# Patient Record
Sex: FEMALE | Race: WHITE | NOT HISPANIC OR LATINO | Employment: OTHER | URBAN - METROPOLITAN AREA
[De-identification: names, ages, dates, MRNs, and addresses within clinical notes are randomized per-mention and may not be internally consistent; named-entity substitution may affect disease eponyms.]

---

## 2020-01-28 ENCOUNTER — OFFICE VISIT (OUTPATIENT)
Dept: CARDIOTHORACIC SURGERY | Facility: CLINIC | Age: 50
End: 2020-01-28
Payer: MEDICARE

## 2020-01-28 VITALS
BODY MASS INDEX: 29.12 KG/M2 | WEIGHT: 164.38 LBS | SYSTOLIC BLOOD PRESSURE: 120 MMHG | HEIGHT: 63 IN | OXYGEN SATURATION: 96 % | DIASTOLIC BLOOD PRESSURE: 69 MMHG | TEMPERATURE: 98 F | HEART RATE: 95 BPM

## 2020-01-28 DIAGNOSIS — J44.9 CHRONIC OBSTRUCTIVE PULMONARY DISEASE, UNSPECIFIED COPD TYPE: ICD-10-CM

## 2020-01-28 DIAGNOSIS — R79.9 ABNORMAL FINDING OF BLOOD CHEMISTRY, UNSPECIFIED: ICD-10-CM

## 2020-01-28 DIAGNOSIS — I63.9 CEREBROVASCULAR ACCIDENT (CVA), UNSPECIFIED MECHANISM: ICD-10-CM

## 2020-01-28 DIAGNOSIS — R79.1 ABNORMAL COAGULATION PROFILE: ICD-10-CM

## 2020-01-28 DIAGNOSIS — M06.9 RHEUMATOID ARTHRITIS, INVOLVING UNSPECIFIED SITE, UNSPECIFIED RHEUMATOID FACTOR PRESENCE: ICD-10-CM

## 2020-01-28 DIAGNOSIS — R94.2 ABNORMAL RESULTS OF PULMONARY FUNCTION STUDIES: ICD-10-CM

## 2020-01-28 DIAGNOSIS — I35.9 AORTIC VALVE DISEASE: Primary | ICD-10-CM

## 2020-01-28 DIAGNOSIS — I67.2 CEREBRAL ATHEROSCLEROSIS: ICD-10-CM

## 2020-01-28 DIAGNOSIS — Z01.810 PREOP CARDIOVASCULAR EXAM: Primary | ICD-10-CM

## 2020-01-28 DIAGNOSIS — Z95.2 S/P AVR (AORTIC VALVE REPLACEMENT): ICD-10-CM

## 2020-01-28 PROCEDURE — 99999 PR PBB SHADOW E&M-NEW PATIENT-LVL III: ICD-10-PCS | Mod: PBBFAC,,, | Performed by: THORACIC SURGERY (CARDIOTHORACIC VASCULAR SURGERY)

## 2020-01-28 PROCEDURE — 99999 PR PBB SHADOW E&M-NEW PATIENT-LVL III: CPT | Mod: PBBFAC,,, | Performed by: THORACIC SURGERY (CARDIOTHORACIC VASCULAR SURGERY)

## 2020-01-28 PROCEDURE — 99205 PR OFFICE/OUTPT VISIT, NEW, LEVL V, 60-74 MIN: ICD-10-PCS | Mod: S$PBB,,, | Performed by: THORACIC SURGERY (CARDIOTHORACIC VASCULAR SURGERY)

## 2020-01-28 PROCEDURE — 99203 OFFICE O/P NEW LOW 30 MIN: CPT | Mod: PBBFAC | Performed by: THORACIC SURGERY (CARDIOTHORACIC VASCULAR SURGERY)

## 2020-01-28 PROCEDURE — 99205 OFFICE O/P NEW HI 60 MIN: CPT | Mod: S$PBB,,, | Performed by: THORACIC SURGERY (CARDIOTHORACIC VASCULAR SURGERY)

## 2020-01-28 RX ORDER — LISINOPRIL 20 MG/1
20 TABLET ORAL
Status: ON HOLD | COMMUNITY
Start: 2016-12-14 | End: 2020-03-16 | Stop reason: HOSPADM

## 2020-01-28 RX ORDER — FLUTICASONE FUROATE AND VILANTEROL 100; 25 UG/1; UG/1
1 POWDER RESPIRATORY (INHALATION)
COMMUNITY
Start: 2019-08-05

## 2020-01-28 RX ORDER — CYCLOBENZAPRINE HCL 10 MG
TABLET ORAL
Status: ON HOLD | COMMUNITY
Start: 2020-01-06 | End: 2020-03-16 | Stop reason: HOSPADM

## 2020-01-28 RX ORDER — POTASSIUM CHLORIDE 750 MG/1
20 TABLET, EXTENDED RELEASE ORAL
Status: ON HOLD | COMMUNITY
End: 2020-03-16 | Stop reason: HOSPADM

## 2020-01-28 RX ORDER — WARFARIN 10 MG/1
10 TABLET ORAL
Status: ON HOLD | COMMUNITY
Start: 2014-06-03 | End: 2020-03-16 | Stop reason: HOSPADM

## 2020-01-28 RX ORDER — ALBUTEROL SULFATE 90 UG/1
1 AEROSOL, METERED RESPIRATORY (INHALATION) EVERY 4 HOURS PRN
COMMUNITY
Start: 2019-08-05

## 2020-01-28 RX ORDER — TEMAZEPAM 7.5 MG/1
7.5 CAPSULE ORAL
COMMUNITY

## 2020-01-28 RX ORDER — ALLOPURINOL 300 MG/1
300 TABLET ORAL
COMMUNITY

## 2020-01-28 RX ORDER — GABAPENTIN 600 MG/1
TABLET ORAL
COMMUNITY
Start: 2020-01-11

## 2020-01-28 RX ORDER — ATORVASTATIN CALCIUM 10 MG/1
TABLET, FILM COATED ORAL
Status: ON HOLD | COMMUNITY
Start: 2020-01-06 | End: 2020-03-16 | Stop reason: HOSPADM

## 2020-01-28 RX ORDER — HYDROXYCHLOROQUINE SULFATE 200 MG/1
200 TABLET, FILM COATED ORAL
COMMUNITY

## 2020-01-28 RX ORDER — MIRABEGRON 25 MG/1
TABLET, FILM COATED, EXTENDED RELEASE ORAL
COMMUNITY
Start: 2019-12-09

## 2020-01-28 RX ORDER — LANOLIN ALCOHOL/MO/W.PET/CERES
100 CREAM (GRAM) TOPICAL
COMMUNITY

## 2020-01-28 RX ORDER — IPRATROPIUM BROMIDE AND ALBUTEROL SULFATE 2.5; .5 MG/3ML; MG/3ML
3 SOLUTION RESPIRATORY (INHALATION) DAILY PRN
COMMUNITY
Start: 2015-08-25

## 2020-01-28 RX ORDER — TRAZODONE HYDROCHLORIDE 50 MG/1
50 TABLET ORAL
COMMUNITY
Start: 2013-12-20

## 2020-01-28 RX ORDER — DIAZEPAM 5 MG/1
5 TABLET ORAL
COMMUNITY

## 2020-01-28 RX ORDER — SUCRALFATE 1 G/1
TABLET ORAL
COMMUNITY
Start: 2020-01-06

## 2020-01-28 RX ORDER — OXYCODONE AND ACETAMINOPHEN 5; 325 MG/1; MG/1
1 TABLET ORAL
Status: ON HOLD | COMMUNITY
Start: 2014-03-28 | End: 2020-03-16 | Stop reason: HOSPADM

## 2020-01-28 RX ORDER — HYDROCODONE BITARTRATE AND ACETAMINOPHEN 5; 325 MG/1; MG/1
1 TABLET ORAL EVERY 6 HOURS PRN
Status: ON HOLD | COMMUNITY
Start: 2017-06-20 | End: 2020-03-16 | Stop reason: HOSPADM

## 2020-01-28 RX ORDER — WARFARIN 2 MG/1
TABLET ORAL
Status: ON HOLD | COMMUNITY
Start: 2020-01-10 | End: 2020-03-16 | Stop reason: HOSPADM

## 2020-01-28 RX ORDER — PANTOPRAZOLE SODIUM 40 MG/1
40 TABLET, DELAYED RELEASE ORAL
Status: ON HOLD | COMMUNITY
Start: 2019-10-09 | End: 2020-03-16 | Stop reason: HOSPADM

## 2020-01-28 RX ORDER — LEFLUNOMIDE 20 MG/1
TABLET ORAL
COMMUNITY
Start: 2019-11-11

## 2020-01-28 RX ORDER — ALBUTEROL SULFATE 90 UG/1
AEROSOL, METERED RESPIRATORY (INHALATION)
COMMUNITY
Start: 2019-10-22

## 2020-01-28 RX ORDER — METOPROLOL SUCCINATE 25 MG/1
25 TABLET, EXTENDED RELEASE ORAL
Status: ON HOLD | COMMUNITY
End: 2020-03-16 | Stop reason: HOSPADM

## 2020-01-28 RX ORDER — PROMETHAZINE HYDROCHLORIDE 25 MG/1
25 TABLET ORAL EVERY 6 HOURS PRN
COMMUNITY

## 2020-01-28 RX ORDER — WARFARIN 7.5 MG/1
15 TABLET ORAL
Status: ON HOLD | COMMUNITY
End: 2020-03-16 | Stop reason: HOSPADM

## 2020-01-28 RX ORDER — ALBUTEROL SULFATE 5 MG/ML
2.5 SOLUTION RESPIRATORY (INHALATION)
COMMUNITY

## 2020-01-28 RX ORDER — FUROSEMIDE 40 MG/1
40 TABLET ORAL
Status: ON HOLD | COMMUNITY
Start: 2015-08-25 | End: 2020-03-16 | Stop reason: HOSPADM

## 2020-01-28 RX ORDER — LEVOFLOXACIN 750 MG/1
TABLET ORAL
Status: ON HOLD | COMMUNITY
Start: 2019-11-05 | End: 2020-03-16 | Stop reason: HOSPADM

## 2020-01-28 NOTE — PROGRESS NOTES
Subjective:      Patient ID: Michelle Hodges is a 49 y.o. female.    Chief Complaint: No chief complaint on file.      HPI:  Michelle Hodges is a 49 y.o. female who presents with s/p mechanical AVR 2012 in [place], , COPD on home oxygen PRN 2L NC, stroke in 2011 with left sided deficits that resolved after TPA and current smoker. Pt was told her aortic valve was not working and went in for reop for redo AVR then SHADE normal functioning aortic valve, surgery was cancelled. Pt has c/o increase COOPER, leg swelling and chest pain that has processed over the past 2 years. Pt here today for surgical eval for redo AVR.       Family and social history reviewed    Review of patient's allergies indicates:  Allergies not on file  No past medical history on file.  No past surgical history on file.  Family History     None        Social History     Socioeconomic History    Marital status:      Spouse name: Not on file    Number of children: Not on file    Years of education: Not on file    Highest education level: Not on file   Occupational History    Not on file   Social Needs    Financial resource strain: Not on file    Food insecurity:     Worry: Not on file     Inability: Not on file    Transportation needs:     Medical: Not on file     Non-medical: Not on file   Tobacco Use    Smoking status: Not on file   Substance and Sexual Activity    Alcohol use: Not on file    Drug use: Not on file    Sexual activity: Not on file   Lifestyle    Physical activity:     Days per week: Not on file     Minutes per session: Not on file    Stress: Not on file   Relationships    Social connections:     Talks on phone: Not on file     Gets together: Not on file     Attends Evangelical service: Not on file     Active member of club or organization: Not on file     Attends meetings of clubs or organizations: Not on file     Relationship status: Not on file   Other Topics Concern    Not on file   Social History Narrative     Not on file       Current medications Reviewed    Review of Systems   Constitutional: Negative for activity change and fatigue.   Respiratory: Positive for shortness of breath. Negative for cough.    Cardiovascular: Positive for leg swelling. Negative for chest pain and palpitations.   Gastrointestinal: Negative for abdominal pain, nausea and vomiting.   Endocrine: Negative for polydipsia, polyphagia and polyuria.   Genitourinary: Negative for dysuria.   Musculoskeletal: Negative for gait problem.   Skin: Negative for rash.   Allergic/Immunologic: Negative for immunocompromised state.   Neurological: Negative for dizziness, syncope and weakness.   Hematological: Does not bruise/bleed easily.   Psychiatric/Behavioral: Negative for behavioral problems.     Objective:   Physical Exam   Constitutional: She is oriented to person, place, and time. She appears well-developed and well-nourished.   HENT:   Head: Normocephalic.   Nose: Nose normal.   Eyes: EOM are normal.   Neck: Normal range of motion.   Cardiovascular: Normal rate, regular rhythm and normal heart sounds.   Pulmonary/Chest: Effort normal and breath sounds normal.   Abdominal: Soft.   Musculoskeletal: Normal range of motion.   Neurological: She is alert and oriented to person, place, and time.   Skin: Skin is warm, dry and intact.   Lower ext discoloration    Psychiatric: She has a normal mood and affect.       Diagnostic Results:   [facility] ECHO: Mechanical AVR, MG 33 Peak 3.8 consistent with prosthetic mismatch. EF 55%    LHC: negative     CT chest: reviewed    All diagnotics and labs reviewed.    STS Score:3%  Assessment:   1. S/P AVR   Plan:   Repeat ECHO, pre op for redo AVR       CTS Attending Note:    I have personally taken the history and examined this patient and agree with the HUGO's note as stated above. Pleasant 49-year-old woman who underwent aortic valve replacement with a mechanical Medtronic valve in 2012 she has had  significant difficulty maintaining INR in range since the operation.  Over the past several months, she has had progressive dyspnea exertion.  An echo demonstrated the equivalent of moderate aortic stenosis, with mean gradients in the 30s.  A fluoroscopic study the valve demonstrated stuck leaflet.  The situation is complicated by her ongoing smoking.  She still smokes half a pack a day.  I discussed her situation with her and her family in detail I agree that we replacement of aortic valve is indicated based the various studies.  I emphasized to the patient and her family that while I thought that we replacement of the valve was reasonable, we could not be certain that it would completely resolve her dyspnea given her underlying lung disease and continued smoking.  We also had a long discussion about valve choice.  Given the difficulty she has had with anticoagulation, she desires a tissue valve.  I think that reasonable.  We discussed the risks and benefits of the proposed procedure, including but not limited to death, stroke, respiratory complications, renal complications, arrythmia, need for permanent pacemaker, and infection. Her questions have been answered, and she wishes to proceed.

## 2020-01-28 NOTE — LETTER
[facility] Cardi[facility]  [address]  [facility] [address]  Phone: [phone] January 28, 2020      Prem Giron Jr., MD  [address] [place] [id number]    Patient: Michelle Hodges   MR Number: [id number]   YOB: 1970   Date of Visit: 1/28/2020     Dear Dr. Giron,     I had the pleasure seeing your patient Ms. Michelle Hodges in clinic today.  As you know, she is a very pleasant 49-year-old woman who underwent aortic valve replacement in 2012 in [place].  At that time, a mechanical valve was placed.  She has had difficulty since, with maintaining her INR in the proper range.  She has had progressive dyspnea on exertion over the past several months.  An echo demonstrated increased velocities in the left ventricular outflow tract as well as an elevated transvalvular gradient.  A fluoroscopic study demonstrated a frozen leaflet.      Based on these findings, I recommended redo aortic valve replacement.  She agreed with this.  We plan to get this done for her sometime in the near future.  Thank you for sending this pleasant woman to me.  It was a pleasure to meet her.  When she does come to surgery, I will certainly keep you apprised of her progress.    Sincerely,        Jack Jara MD  Chief, Division of Thoracic & Cardiovascular Surgery  [facility]    PEP/etb    CC  Ramiro Orozco MD

## 2020-01-29 ENCOUNTER — TELEPHONE (OUTPATIENT)
Dept: PREADMISSION TESTING | Facility: HOSPITAL | Age: 50
End: 2020-01-29

## 2020-01-29 ENCOUNTER — PATIENT MESSAGE (OUTPATIENT)
Dept: CARDIOTHORACIC SURGERY | Facility: CLINIC | Age: 50
End: 2020-01-29

## 2020-01-29 RX ORDER — ENOXAPARIN SODIUM 150 MG/ML
1 INJECTION SUBCUTANEOUS EVERY 12 HOURS
Qty: 4 ML | Refills: 0 | Status: SHIPPED | OUTPATIENT
Start: 2020-02-10 | End: 2020-01-31

## 2020-01-29 RX ORDER — ENOXAPARIN SODIUM 150 MG/ML
1 INJECTION SUBCUTANEOUS
COMMUNITY
Start: 2020-02-10 | End: 2020-01-29 | Stop reason: SDUPTHER

## 2020-01-29 NOTE — TELEPHONE ENCOUNTER
----- Message from Aureliano Martins RN sent at 1/28/2020  3:15 PM CST -----  Anesthesia Pre op Appt Request - Estef[facility]    02/14/20  AVR re-do    Requested Date:  2/13/20 Thursday      Thanks........ Juan   [id number]

## 2020-01-31 ENCOUNTER — OUTSIDE PLACE OF SERVICE (OUTPATIENT)
Dept: CARDIOLOGY | Facility: CLINIC | Age: 50
End: 2020-01-31

## 2020-01-31 ENCOUNTER — PATIENT MESSAGE (OUTPATIENT)
Dept: CARDIOTHORACIC SURGERY | Facility: CLINIC | Age: 50
End: 2020-01-31

## 2020-01-31 RX ORDER — ENOXAPARIN SODIUM 150 MG/ML
INJECTION SUBCUTANEOUS
Qty: 4 ML | Refills: 0 | Status: ON HOLD | OUTPATIENT
Start: 2020-01-31 | End: 2020-03-16 | Stop reason: HOSPADM

## 2020-01-31 RX ORDER — ENOXAPARIN SODIUM 150 MG/ML
1 INJECTION SUBCUTANEOUS EVERY 12 HOURS
Qty: 9 SYRINGE | Refills: 0 | Status: CANCELLED | OUTPATIENT
Start: 2020-01-31 | End: 2020-02-05

## 2020-02-13 ENCOUNTER — ANESTHESIA EVENT (OUTPATIENT)
Dept: SURGERY | Facility: HOSPITAL | Age: 50
DRG: 220 | End: 2020-02-13
Payer: MEDICARE

## 2020-02-13 ENCOUNTER — HOSPITAL ENCOUNTER (OUTPATIENT)
Dept: PULMONOLOGY | Facility: CLINIC | Age: 50
Discharge: HOME | End: 2020-02-13
Payer: MEDICARE

## 2020-02-13 ENCOUNTER — DOCUMENTATION ONLY (OUTPATIENT)
Dept: CARDIOTHORACIC SURGERY | Facility: CLINIC | Age: 50
End: 2020-02-13

## 2020-02-13 ENCOUNTER — HOSPITAL ENCOUNTER (OUTPATIENT)
Dept: CARDIOLOGY | Facility: CLINIC | Age: 50
Discharge: HOME | End: 2020-02-13
Payer: MEDICARE

## 2020-02-13 ENCOUNTER — HOSPITAL ENCOUNTER (OUTPATIENT)
Dept: PREADMISSION TESTING | Facility: HOSPITAL | Age: 50
Discharge: HOME | End: 2020-02-13
Attending: THORACIC SURGERY (CARDIOTHORACIC VASCULAR SURGERY)
Payer: MEDICARE

## 2020-02-13 ENCOUNTER — OFFICE VISIT (OUTPATIENT)
Dept: CARDIOTHORACIC SURGERY | Facility: CLINIC | Age: 50
End: 2020-02-13
Payer: MEDICARE

## 2020-02-13 ENCOUNTER — HOSPITAL ENCOUNTER (OUTPATIENT)
Dept: RADIOLOGY | Facility: HOSPITAL | Age: 50
Discharge: HOME | End: 2020-02-13
Attending: THORACIC SURGERY (CARDIOTHORACIC VASCULAR SURGERY)
Payer: MEDICARE

## 2020-02-13 ENCOUNTER — HOSPITAL ENCOUNTER (OUTPATIENT)
Dept: CARDIOLOGY | Facility: CLINIC | Age: 50
Discharge: HOME | End: 2020-02-13
Attending: THORACIC SURGERY (CARDIOTHORACIC VASCULAR SURGERY)
Payer: MEDICARE

## 2020-02-13 ENCOUNTER — HOSPITAL ENCOUNTER (OUTPATIENT)
Dept: VASCULAR SURGERY | Facility: CLINIC | Age: 50
Discharge: HOME | End: 2020-02-13
Attending: THORACIC SURGERY (CARDIOTHORACIC VASCULAR SURGERY)
Payer: MEDICARE

## 2020-02-13 VITALS
WEIGHT: 160.06 LBS | BODY MASS INDEX: 28.35 KG/M2 | HEART RATE: 90 BPM | OXYGEN SATURATION: 97 % | DIASTOLIC BLOOD PRESSURE: 68 MMHG | TEMPERATURE: 98 F | SYSTOLIC BLOOD PRESSURE: 117 MMHG

## 2020-02-13 VITALS
SYSTOLIC BLOOD PRESSURE: 112 MMHG | DIASTOLIC BLOOD PRESSURE: 74 MMHG | HEART RATE: 94 BPM | WEIGHT: 162 LBS | HEIGHT: 63 IN | BODY MASS INDEX: 28.7 KG/M2

## 2020-02-13 VITALS
WEIGHT: 162 LBS | SYSTOLIC BLOOD PRESSURE: 111 MMHG | DIASTOLIC BLOOD PRESSURE: 74 MMHG | TEMPERATURE: 98 F | HEIGHT: 63 IN | BODY MASS INDEX: 28.7 KG/M2 | OXYGEN SATURATION: 98 % | HEART RATE: 93 BPM | RESPIRATION RATE: 16 BRPM

## 2020-02-13 DIAGNOSIS — I65.23 BILATERAL CAROTID ARTERY STENOSIS: ICD-10-CM

## 2020-02-13 DIAGNOSIS — T82.09XD PROSTHETIC VALVE DYSFUNCTION, SUBSEQUENT ENCOUNTER: ICD-10-CM

## 2020-02-13 DIAGNOSIS — Z95.2 S/P AVR (AORTIC VALVE REPLACEMENT): ICD-10-CM

## 2020-02-13 DIAGNOSIS — Z01.810 PREOP CARDIOVASCULAR EXAM: ICD-10-CM

## 2020-02-13 DIAGNOSIS — Z79.01 LONG TERM CURRENT USE OF ANTICOAGULANT: ICD-10-CM

## 2020-02-13 DIAGNOSIS — I67.2 CEREBRAL ATHEROSCLEROSIS: ICD-10-CM

## 2020-02-13 DIAGNOSIS — R94.2 ABNORMAL RESULTS OF PULMONARY FUNCTION STUDIES: ICD-10-CM

## 2020-02-13 DIAGNOSIS — Z95.2 S/P AVR (AORTIC VALVE REPLACEMENT): Primary | ICD-10-CM

## 2020-02-13 PROBLEM — T82.09XA PROSTHETIC VALVE DYSFUNCTION: Status: ACTIVE | Noted: 2020-02-13

## 2020-02-13 LAB
ASCENDING AORTA: 2.21 CM
AV INDEX (PROSTH): 0.36
AV MEAN GRADIENT: 51 MMHG
AV PEAK GRADIENT: 88 MMHG
AV VALVE AREA: 1.12 CM2
AV VELOCITY RATIO: 0.34
BSA FOR ECHO PROCEDURE: 1.81 M2
CV ECHO LV RWT: 0.33 CM
DLCO ADJ PRE: 13.93 ML/(MIN*MMHG) (ref 18.44–29.9)
DLCO SINGLE BREATH LLN: 18.44
DLCO SINGLE BREATH PRE REF: 56.5 %
DLCO SINGLE BREATH REF: 24.17
DLCOC SBVA LLN: 3.36
DLCOC SBVA PRE REF: 62.4 %
DLCOC SBVA REF: 4.87
DLCOC SINGLE BREATH LLN: 18.44
DLCOC SINGLE BREATH PRE REF: 57.6 %
DLCOC SINGLE BREATH REF: 24.17
DLCOCSBVAULN: 6.37
DLCOCSINGLEBREATHULN: 29.9
DLCOSINGLEBREATHULN: 29.9
DLCOVA LLN: 3.36
DLCOVA PRE REF: 61.3 %
DLCOVA PRE: 2.98 ML/(MIN*MMHG*L) (ref 3.36–6.37)
DLCOVA REF: 4.87
DLCOVAULN: 6.37
DLVAADJ PRE: 3.04 ML/(MIN*MMHG*L) (ref 3.36–6.37)
DOP CALC AO PEAK VEL: 4.68 M/S
DOP CALC AO VTI: 90 CM
DOP CALC LVOT AREA: 3.1 CM2
DOP CALC LVOT DIAMETER: 2 CM
DOP CALC LVOT PEAK VEL: 1.58 M/S
DOP CALC LVOT STROKE VOLUME: 100.48 CM3
DOP CALCLVOT PEAK VEL VTI: 32 CM
E WAVE DECELERATION TIME: 196.53 MSEC
E/A RATIO: 1.19
E/E' RATIO: 11.67 M/S
ECHO LV POSTERIOR WALL: 0.71 CM (ref 0.6–1.1)
FEF 25 75 LLN: 1.48
FEF 25 75 PRE REF: 69.8 %
FEF 25 75 REF: 2.67
FEV05 LLN: 1.12
FEV05 REF: 1.98
FEV1 FVC LLN: 69
FEV1 FVC PRE REF: 94.6 %
FEV1 FVC REF: 80
FEV1 LLN: 2.13
FEV1 PRE REF: 81.3 %
FEV1 REF: 2.74
FRACTIONAL SHORTENING: 35 % (ref 28–44)
FVC LLN: 2.68
FVC PRE REF: 85.4 %
FVC REF: 3.44
INTERVENTRICULAR SEPTUM: 0.91 CM (ref 0.6–1.1)
IVC PRE: 3.04 L (ref 2.68–4.19)
IVC SINGLE BREATH LLN: 2.68
IVC SINGLE BREATH PRE REF: 88.4 %
IVC SINGLE BREATH REF: 3.44
IVCSINGLEBREATHULN: 4.19
LA MAJOR: 4.37 CM
LA MINOR: 4.34 CM
LA WIDTH: 4 CM
LEFT ATRIUM SIZE: 4 CM
LEFT ATRIUM VOLUME INDEX: 33.5 ML/M2
LEFT ATRIUM VOLUME: 59.23 CM3
LEFT INTERNAL DIMENSION IN SYSTOLE: 2.82 CM (ref 2.1–4)
LEFT VENTRICLE DIASTOLIC VOLUME INDEX: 47.42 ML/M2
LEFT VENTRICLE DIASTOLIC VOLUME: 83.85 ML
LEFT VENTRICLE MASS INDEX: 61 G/M2
LEFT VENTRICLE SYSTOLIC VOLUME INDEX: 17 ML/M2
LEFT VENTRICLE SYSTOLIC VOLUME: 30.09 ML
LEFT VENTRICULAR INTERNAL DIMENSION IN DIASTOLE: 4.32 CM (ref 3.5–6)
LEFT VENTRICULAR MASS: 107.9 G
LV LATERAL E/E' RATIO: 13.13 M/S
LV SEPTAL E/E' RATIO: 10.5 M/S
MV PEAK A VEL: 0.88 M/S
MV PEAK E VEL: 1.05 M/S
MVV LLN: 87
MVV PRE REF: 106.3 %
MVV REF: 103
PEF LLN: 5.01
PEF PRE REF: 76.5 %
PEF REF: 6.73
PHYSICIAN COMMENT: ABNORMAL
PISA TR MAX VEL: 2.56 M/S
PRE DLCO: 13.66 ML/(MIN*MMHG) (ref 18.44–29.9)
PRE FEF 25 75: 1.86 L/S (ref 1.48–3.86)
PRE FET 100: 6.22 SEC
PRE FEV05 REF: 91.4 %
PRE FEV1 FVC: 75.98 % (ref 69.1–91.56)
PRE FEV1: 2.23 L (ref 2.13–3.36)
PRE FEV5: 1.81 L (ref 1.12–2.83)
PRE FVC: 2.94 L (ref 2.68–4.19)
PRE MVV: 109 L/MIN (ref 87.16–117.92)
PRE PEF: 5.15 L/S (ref 5.01–8.45)
PULM VEIN S/D RATIO: 0.8
PV PEAK D VEL: 0.76 M/S
PV PEAK S VEL: 0.61 M/S
RA MAJOR: 3.62 CM
RA PRESSURE: 3 MMHG
RA WIDTH: 2.81 CM
RIGHT VENTRICULAR END-DIASTOLIC DIMENSION: 2.84 CM
SINUS: 2.53 CM
STJ: 1.76 CM
TDI LATERAL: 0.08 M/S
TDI SEPTAL: 0.1 M/S
TDI: 0.09 M/S
TR MAX PG: 26 MMHG
TRICUSPID ANNULAR PLANE SYSTOLIC EXCURSION: 1.37 CM
TV REST PULMONARY ARTERY PRESSURE: 29 MMHG
VA PRE: 4.58 L (ref 4.82–4.82)
VA SINGLE BREATH LLN: 4.82
VA SINGLE BREATH PRE REF: 95.1 %
VA SINGLE BREATH REF: 4.82
VASINGLEBREATHULN: 4.82

## 2020-02-13 PROCEDURE — 93306 TTE W/DOPPLER COMPLETE: CPT | Mod: PBBFAC | Performed by: INTERNAL MEDICINE

## 2020-02-13 PROCEDURE — 93010 EKG 12-LEAD: ICD-10-PCS | Mod: S$PBB,,, | Performed by: INTERNAL MEDICINE

## 2020-02-13 PROCEDURE — 71046 XR CHEST PA AND LATERAL: ICD-10-PCS | Mod: 26,,, | Performed by: RADIOLOGY

## 2020-02-13 PROCEDURE — 71046 X-RAY EXAM CHEST 2 VIEWS: CPT | Mod: TC,FY

## 2020-02-13 PROCEDURE — 94010 BREATHING CAPACITY TEST: ICD-10-PCS | Mod: 26,S$PBB,, | Performed by: INTERNAL MEDICINE

## 2020-02-13 PROCEDURE — 99999 PR PBB SHADOW E&M-EST. PATIENT-LVL IV: CPT | Mod: PBBFAC,,, | Performed by: THORACIC SURGERY (CARDIOTHORACIC VASCULAR SURGERY)

## 2020-02-13 PROCEDURE — 94010 BREATHING CAPACITY TEST: CPT | Mod: PBBFAC | Performed by: INTERNAL MEDICINE

## 2020-02-13 PROCEDURE — 99499 UNLISTED E&M SERVICE: CPT | Mod: S$PBB,,, | Performed by: THORACIC SURGERY (CARDIOTHORACIC VASCULAR SURGERY)

## 2020-02-13 PROCEDURE — 93005 ELECTROCARDIOGRAM TRACING: CPT | Mod: PBBFAC | Performed by: INTERNAL MEDICINE

## 2020-02-13 PROCEDURE — 93306 ECHO (CUPID ONLY): ICD-10-PCS | Mod: 26,S$PBB,, | Performed by: INTERNAL MEDICINE

## 2020-02-13 PROCEDURE — 94729 PR C02/MEMBANE DIFFUSE CAPACITY: ICD-10-PCS | Mod: 26,S$PBB,, | Performed by: INTERNAL MEDICINE

## 2020-02-13 PROCEDURE — 93880 EXTRACRANIAL BILAT STUDY: CPT | Mod: PBBFAC | Performed by: SURGERY

## 2020-02-13 PROCEDURE — 94729 DIFFUSING CAPACITY: CPT | Mod: PBBFAC | Performed by: INTERNAL MEDICINE

## 2020-02-13 PROCEDURE — 93010 ELECTROCARDIOGRAM REPORT: CPT | Mod: S$PBB,,, | Performed by: INTERNAL MEDICINE

## 2020-02-13 PROCEDURE — 99999 PR PBB SHADOW E&M-EST. PATIENT-LVL IV: ICD-10-PCS | Mod: PBBFAC,,, | Performed by: THORACIC SURGERY (CARDIOTHORACIC VASCULAR SURGERY)

## 2020-02-13 PROCEDURE — 94729 DIFFUSING CAPACITY: CPT | Mod: 26,S$PBB,, | Performed by: INTERNAL MEDICINE

## 2020-02-13 PROCEDURE — 71046 X-RAY EXAM CHEST 2 VIEWS: CPT | Mod: 26,,, | Performed by: RADIOLOGY

## 2020-02-13 PROCEDURE — 99214 OFFICE O/P EST MOD 30 MIN: CPT | Mod: PBBFAC,25 | Performed by: THORACIC SURGERY (CARDIOTHORACIC VASCULAR SURGERY)

## 2020-02-13 PROCEDURE — 94010 BREATHING CAPACITY TEST: CPT | Mod: 26,S$PBB,, | Performed by: INTERNAL MEDICINE

## 2020-02-13 PROCEDURE — 93880 PR DUPLEX SCAN EXTRACRANIAL,BILAT: ICD-10-PCS | Mod: 26,S$PBB,, | Performed by: SURGERY

## 2020-02-13 PROCEDURE — 99499 NO LOS: ICD-10-PCS | Mod: S$PBB,,, | Performed by: THORACIC SURGERY (CARDIOTHORACIC VASCULAR SURGERY)

## 2020-02-13 PROCEDURE — 93880 EXTRACRANIAL BILAT STUDY: CPT | Mod: 26,S$PBB,, | Performed by: SURGERY

## 2020-02-13 RX ORDER — ACETAMINOPHEN 325 MG/1
650 TABLET ORAL EVERY 4 HOURS PRN
Status: CANCELLED | OUTPATIENT
Start: 2020-02-13

## 2020-02-13 RX ORDER — POTASSIUM CHLORIDE 750 MG/1
20 TABLET, EXTENDED RELEASE ORAL EVERY 12 HOURS
Status: CANCELLED | OUTPATIENT
Start: 2020-02-13

## 2020-02-13 RX ORDER — POTASSIUM CHLORIDE 14.9 MG/ML
20 INJECTION INTRAVENOUS
Status: CANCELLED | OUTPATIENT
Start: 2020-02-13

## 2020-02-13 RX ORDER — FENTANYL CITRATE 50 UG/ML
50 INJECTION, SOLUTION INTRAMUSCULAR; INTRAVENOUS
Status: CANCELLED | OUTPATIENT
Start: 2020-03-14

## 2020-02-13 RX ORDER — FENTANYL CITRATE 50 UG/ML
25 INJECTION, SOLUTION INTRAMUSCULAR; INTRAVENOUS
Status: CANCELLED | OUTPATIENT
Start: 2020-02-13 | End: 2020-03-13

## 2020-02-13 RX ORDER — LIDOCAINE HYDROCHLORIDE 10 MG/ML
1 INJECTION, SOLUTION EPIDURAL; INFILTRATION; INTRACAUDAL; PERINEURAL
Status: CANCELLED | OUTPATIENT
Start: 2020-02-13

## 2020-02-13 RX ORDER — POTASSIUM CHLORIDE 29.8 MG/ML
40 INJECTION INTRAVENOUS
Status: CANCELLED | OUTPATIENT
Start: 2020-02-13

## 2020-02-13 RX ORDER — ASPIRIN 325 MG
325 TABLET, DELAYED RELEASE (ENTERIC COATED) ORAL DAILY
Status: CANCELLED | OUTPATIENT
Start: 2020-02-14

## 2020-02-13 RX ORDER — IPRATROPIUM BROMIDE AND ALBUTEROL SULFATE 2.5; .5 MG/3ML; MG/3ML
3 SOLUTION RESPIRATORY (INHALATION) EVERY 4 HOURS
Status: CANCELLED | OUTPATIENT
Start: 2020-02-13 | End: 2020-02-14

## 2020-02-13 RX ORDER — METOCLOPRAMIDE HYDROCHLORIDE 5 MG/ML
5 INJECTION INTRAMUSCULAR; INTRAVENOUS EVERY 6 HOURS PRN
Status: CANCELLED | OUTPATIENT
Start: 2020-02-13

## 2020-02-13 RX ORDER — CITALOPRAM 40 MG/1
40 TABLET, FILM COATED ORAL
COMMUNITY

## 2020-02-13 RX ORDER — OXYCODONE HYDROCHLORIDE 5 MG/1
10 TABLET ORAL EVERY 4 HOURS PRN
Status: CANCELLED | OUTPATIENT
Start: 2020-02-13

## 2020-02-13 RX ORDER — ASPIRIN 300 MG/1
300 SUPPOSITORY RECTAL ONCE AS NEEDED
Status: CANCELLED | OUTPATIENT
Start: 2020-02-13 | End: 2031-07-12

## 2020-02-13 RX ORDER — ASPIRIN 325 MG
325 TABLET ORAL DAILY
Status: CANCELLED | OUTPATIENT
Start: 2020-02-13

## 2020-02-13 RX ORDER — METOPROLOL TARTRATE 25 MG/1
25 TABLET ORAL
Status: CANCELLED | OUTPATIENT
Start: 2020-02-13

## 2020-02-13 RX ORDER — OXYCODONE HYDROCHLORIDE 5 MG/1
5 TABLET ORAL EVERY 4 HOURS PRN
Status: CANCELLED | OUTPATIENT
Start: 2020-02-13

## 2020-02-13 RX ORDER — SODIUM CHLORIDE 0.9 % (FLUSH) 0.9 %
10 SYRINGE (ML) INJECTION
Status: CANCELLED | OUTPATIENT
Start: 2020-02-13

## 2020-02-13 RX ORDER — DEXTROSE MONOHYDRATE, SODIUM CHLORIDE, AND POTASSIUM CHLORIDE 50; 1.49; 4.5 G/1000ML; G/1000ML; G/1000ML
INJECTION, SOLUTION INTRAVENOUS CONTINUOUS
Status: CANCELLED | OUTPATIENT
Start: 2020-02-13

## 2020-02-13 RX ORDER — ATORVASTATIN CALCIUM 10 MG/1
40 TABLET, FILM COATED ORAL NIGHTLY
Status: CANCELLED | OUTPATIENT
Start: 2020-02-13

## 2020-02-13 RX ORDER — MUPIROCIN 20 MG/G
1 OINTMENT TOPICAL
Status: CANCELLED | OUTPATIENT
Start: 2020-02-13

## 2020-02-13 RX ORDER — FENTANYL CITRATE 50 UG/ML
25 INJECTION, SOLUTION INTRAMUSCULAR; INTRAVENOUS
Status: CANCELLED | OUTPATIENT
Start: 2020-02-13

## 2020-02-13 RX ORDER — ASPIRIN 325 MG
325 TABLET ORAL ONCE
Status: CANCELLED | OUTPATIENT
Start: 2020-02-13 | End: 2020-02-13

## 2020-02-13 RX ORDER — IPRATROPIUM BROMIDE AND ALBUTEROL SULFATE 2.5; .5 MG/3ML; MG/3ML
3 SOLUTION RESPIRATORY (INHALATION) EVERY 4 HOURS PRN
Status: CANCELLED | OUTPATIENT
Start: 2020-02-13 | End: 2020-02-14

## 2020-02-13 RX ORDER — PANTOPRAZOLE SODIUM 40 MG/1
40 TABLET, DELAYED RELEASE ORAL
Status: CANCELLED | OUTPATIENT
Start: 2020-02-14

## 2020-02-13 RX ORDER — BISACODYL 10 MG
10 SUPPOSITORY, RECTAL RECTAL EVERY 12 HOURS PRN
Status: CANCELLED | OUTPATIENT
Start: 2020-02-13

## 2020-02-13 RX ORDER — SODIUM CHLORIDE 9 MG/ML
INJECTION, SOLUTION INTRAVENOUS CONTINUOUS
Status: CANCELLED | OUTPATIENT
Start: 2020-02-13

## 2020-02-13 RX ORDER — PANTOPRAZOLE SODIUM 40 MG/10ML
40 INJECTION, POWDER, LYOPHILIZED, FOR SOLUTION INTRAVENOUS DAILY
Status: CANCELLED | OUTPATIENT
Start: 2020-02-14

## 2020-02-13 RX ORDER — PROPOFOL 10 MG/ML
5 INJECTION, EMULSION INTRAVENOUS CONTINUOUS
Status: CANCELLED | OUTPATIENT
Start: 2020-02-13

## 2020-02-13 RX ORDER — MUPIROCIN 20 MG/G
1 OINTMENT TOPICAL 2 TIMES DAILY
Status: CANCELLED | OUTPATIENT
Start: 2020-02-13 | End: 2020-02-18

## 2020-02-13 RX ORDER — ALBUMIN HUMAN 50 G/1000ML
500 SOLUTION INTRAVENOUS ONCE AS NEEDED
Status: CANCELLED | OUTPATIENT
Start: 2020-02-13 | End: 2031-07-12

## 2020-02-13 RX ORDER — ASPIRIN 81 MG/1
81 TABLET ORAL
Status: ON HOLD | COMMUNITY
End: 2020-03-16 | Stop reason: HOSPADM

## 2020-02-13 RX ORDER — POTASSIUM CHLORIDE 14.9 MG/ML
60 INJECTION INTRAVENOUS
Status: CANCELLED | OUTPATIENT
Start: 2020-02-13

## 2020-02-13 RX ORDER — POLYETHYLENE GLYCOL 3350 17 G/17G
17 POWDER, FOR SOLUTION ORAL DAILY
Status: CANCELLED | OUTPATIENT
Start: 2020-02-14

## 2020-02-13 RX ORDER — BACLOFEN 20 MG/1
20 TABLET ORAL
COMMUNITY

## 2020-02-13 RX ORDER — DOCUSATE SODIUM 100 MG/1
100 CAPSULE, LIQUID FILLED ORAL 2 TIMES DAILY
Status: CANCELLED | OUTPATIENT
Start: 2020-02-13

## 2020-02-13 RX ORDER — ONDANSETRON 2 MG/ML
4 INJECTION INTRAMUSCULAR; INTRAVENOUS EVERY 12 HOURS PRN
Status: CANCELLED | OUTPATIENT
Start: 2020-02-13

## 2020-02-13 NOTE — PATIENT INSTRUCTIONS
"Please review and follow all directions and information contained on the "brown" Pre-operation Instruction Sheet that you were given in the Cardiac Surgery Clinic.    Please call our office if you have further questions.          [phone] (8a-4p) Monday - Friday    On very rare occasions, your scheduled surgery time may be changed, delayed or canceled due to the surgeon having to do Emergency Surgery or Heart / Lung Transplants overnight before a regularly scheduled case.    If this occurs, you will be notified as soon as possible by the Operating Room or the Pre-op Center.    "

## 2020-02-13 NOTE — PROGRESS NOTES
Patient in for pre-op visit, Instructions given as follows:  PREPARING FOR SURGERY  Your surgery has been scheduled for: ?  Day: _Tuesday___________ Date:  __3/10/2020_________?  Arrival Time: Informed patient that we will call her the day before with surgery time and arrival time.    You should report to the second floor surgery center, located on the [facility] side of the second floor of the [facility]. The phone number is [phone].?   ?  PLEASE NOTE?  ? If you are allergic to any medications, please inform your doctor or the nurse responsible for your care.?  ? Tell the doctor if you take aspirin, products containing aspirin, herbal medications or blood thinners, such as Coumadin, Pradaxa, or Plavix.?  ? Notify your doctor if you are diabetic and provide information about the medications you take.?  ? Arrange for someone to drive you home following surgery. You will not be allowed to leave the surgical facility alone or drive yourself home following sedation and anesthesia.?  ? If you have not already done so, please bring a list of your medications with you the day of your surgery.?   ?  BEFORE SURGERY?  Stop taking all herbal medications 14 days prior to surgery?  Stop taking aspirin, products containing aspirin 0 days before surgery?  Stop taking blood thinners 5 days before surgery?  Refrain from drinking alcohol beverages for 24 hours before and after surgery?  Stop or limit smoking 0 days before surgery?  Other: ________________________________________________________?   ?  THE NIGHT BEFORE SURGERY?  Eat a light supper on the night before your surgery, no greasy or fatty foods.?  DO NOT EAT OR DRINK ANYTHING AFTER MIDNIGHT, INCLUDING GUM, HARD CANDY, MINTS, OR CHEWING TOBACCO?  Take a complete shower. Wash your body from the neck down with Hibiclens (chlorhexidine gluconate) soap. Hibiclens soap may be purchased over the counter at the pharmacy. Keep the soap away from your eyes, ears,  "and mouth. After washing with Hibiclens, rinse thoroughly. You may also use any soap labeled "antibacterial". Shampoo your hair with your regular shampoo.?   ?  THE DAY OF SURGERY?  Take another shower with Hibiclens or any antibacterial soap, to reduce the change of infection.?  Medications to take the morning of surgery:_metoprolol and protonix______________ with a small sip of water. Do not take diuretics or fluid pills.?  Diabetic medication instructions will be given by the preop center. They will call you before your surgery.?  You may brush your teeth and rinse your mouth, but do not shallow any water.?  Do not apply perfume, powder, body lotions or deodorant on the day of surgery.?  Do not wear any makeup, including mascara and false eyelashes.?  Nail polish should be removed.?  Wear comfortable clothes, such as button front shirt and loose-fitting pants.?  Leave all jewelry, including body piercings and valuables at home.?  Hairpins and clasps must be removed before you enter the operating room.?  You may wear glasses, dentures and hearing aids before and after surgery. They may need to be removed before going into the operating room. Contact lenses worn before surgery must be removed before entering the operating room. Please bring a case for your hearing aids, glasses and/or contacts.?  Bring any devices you will need after surgery such as crutches or canes.?  If you have sleep apnea, please bring your CPAP machine.?  If you have an implantable device, such as a pacemaker or AICD, please bring the device information card, if you have one.?   ?  If you have any questions or concerns, please don't hesitate to call.?  All questions answered, verbalized understanding.    "

## 2020-02-13 NOTE — H&P (VIEW-ONLY)
Subjective:      Patient ID: Michelle Hodges is a 49 y.o. female.     Chief Complaint: No chief complaint on file.        HPI:  Michelle Hodges is a 49 y.o. female who presents with s/p mechanical AVR 2012 in [place], , COPD on home oxygen PRN 2L NC, stroke in 2011 with left sided deficits that resolved after TPA and current smoker. Pt was told her aortic valve was not working and went in for reop for redo AVR then SHADE normal functioning aortic valve, surgery was cancelled. Pt has c/o increase COOPER, leg swelling and chest pain that has processed over the past 2 years. Pt here today for surgical eval for redo AVR.         Family and social history reviewed     Review of patient's allergies indicates:  Allergies not on file  No past medical history on file.  No past surgical history on file.      Family History      None          Social History               Socioeconomic History    Marital status:        Spouse name: Not on file    Number of children: Not on file    Years of education: Not on file    Highest education level: Not on file   Occupational History    Not on file   Social Needs    Financial resource strain: Not on file    Food insecurity:       Worry: Not on file       Inability: Not on file    Transportation needs:       Medical: Not on file       Non-medical: Not on file   Tobacco Use    Smoking status: Not on file   Substance and Sexual Activity    Alcohol use: Not on file    Drug use: Not on file    Sexual activity: Not on file   Lifestyle    Physical activity:       Days per week: Not on file       Minutes per session: Not on file    Stress: Not on file   Relationships    Social connections:       Talks on phone: Not on file       Gets together: Not on file       Attends Confucianist service: Not on file       Active member of club or organization: Not on file       Attends meetings of clubs or organizations: Not on file       Relationship status: Not on file   Other Topics  Concern    Not on file   Social History Narrative    Not on file            Current medications Reviewed     Review of Systems   Constitutional: Negative for activity change and fatigue.   Respiratory: Positive for shortness of breath. Negative for cough.    Cardiovascular: Positive for leg swelling. Negative for chest pain and palpitations.   Gastrointestinal: Negative for abdominal pain, nausea and vomiting.   Endocrine: Negative for polydipsia, polyphagia and polyuria.   Genitourinary: Negative for dysuria.   Musculoskeletal: Negative for gait problem.   Skin: Negative for rash.   Allergic/Immunologic: Negative for immunocompromised state.   Neurological: Negative for dizziness, syncope and weakness.   Hematological: Does not bruise/bleed easily.   Psychiatric/Behavioral: Negative for behavioral problems.      Objective:   Physical Exam   Constitutional: She is oriented to person, place, and time. She appears well-developed and well-nourished.   HENT:   Head: Normocephalic.   Nose: Nose normal.   Eyes: EOM are normal.   Neck: Normal range of motion.   Cardiovascular: Normal rate, regular rhythm and normal heart sounds.   Pulmonary/Chest: Effort normal and breath sounds normal.   Abdominal: Soft.   Musculoskeletal: Normal range of motion.   Neurological: She is alert and oriented to person, place, and time.   Skin: Skin is warm, dry and intact.   Lower ext discoloration    Psychiatric: She has a normal mood and affect.         Diagnostic Results:   ECHO: 2/13/20  · Normal left ventricular systolic function. The estimated ejection fraction is 65%.  · Normal LV diastolic function.  · No wall motion abnormalities.  · Normal right ventricular systolic function.  · There is a mechanical aortic valve present. Valve was not well visualized. Based on Act of <100ms and AcT/ET ratio of <30 the high mean gradient (50mmHg) and peak velocities (4.7m/s) are mor likely secondary to PPM rather than valve dysfunction  · Mild  tricuspid regurgitation.  · The estimated PA systolic pressure is 29 mmHg.  · Normal central venous pressure (3 mmHg).    All diagnotics and labs reviewed.     STS Score:3%  Assessment:   1. S/P AVR   Plan:   Redo AVR 3/10/20     CTS Attending Note:    I have personally taken the history and examined this patient and agree with the HUGO's note as stated above. Pleasant 49-year-old woman with a 19 mm Medtronic mechanical prosthesis.  Previous fluoro study indicated 1 of the leaflets was stuck.  We plan for redo sternotomy and redo aortic valve replacement.  She has struggled significantly with anticoagulation, and desires a tissue valve.  We plan for a Perceval valve so that TA VI will be an option down the road.  I reviewed the plan with her and her family member in clinic today.  Her operation it was originally scheduled for 2/14.  Unfortunately, there is a heart transplant to night.  My upcoming availability is limited.  I plan to cancel clinic on March 10th and proceed with her operation at that time.  I explained this to her and her family member.  They were very gracious and understanding.

## 2020-02-13 NOTE — ANESTHESIA PREPROCEDURE EVALUATION
[facility]  Anesthesia Pre-Operative Evaluation         Patient Name: Michelle Hodges  YOB: 1970  MRN: [id number]    SUBJECTIVE:     Pre-operative evaluation for Procedure(s) (LRB):  REPLACEMENT, AORTIC VALVE, WITH REPEAT STERNOTOMY (N/A)     02/13/2020    Michelle Hodges is a 49 y.o. female w/ a significant PMHx of COPD (home O2 2L PRN), current smoker (0.5 pk/day), RA, hx of CVA (s/p tPA, no residual symptoms), and s/p mechanical AVR (2012, on warfarin) who presents for redo aortic valve replacement. Reports progressive dyspnea on exertion over the past year w/ increased PRN use of home O2. Mild functional limitations, however remains independent for ADLs. Echo indicative of prosthetic valve mismatch.     Patient now presents for the above procedure(s).      LDA: None documented.     Prev airway: None documented.    Drips: None documented.      Patient Active Problem List   Diagnosis    S/P AVR (aortic valve replacement)    COPD (chronic obstructive pulmonary disease)    RA (rheumatoid arthritis)    Stroke       Review of patient's allergies indicates:   Allergen Reactions    Penicillins Swelling    Methotrexate        Current Inpatient Medications:      Current Outpatient Medications on File Prior to Encounter   Medication Sig Dispense Refill    albuterol (PROVENTIL) 5 mg/mL nebulizer solution Inhale 2.5 mg into the lungs.      albuterol (PROVENTIL/VENTOLIN HFA) 90 mcg/actuation inhaler Inhale 1 puff into the lungs every 4 (four) hours as needed.      albuterol-ipratropium (DUO-NEB) 2.5 mg-0.5 mg/3 mL nebulizer solution 3 mLs daily as needed.      allopurinol (ZYLOPRIM) 300 MG tablet Take 300 mg by mouth.      atorvastatin (LIPITOR) 10 MG tablet       cyclobenzaprine (FLEXERIL) 10 MG tablet       diazePAM (VALIUM) 5 MG tablet Take 5 mg by mouth.      enoxaparin (LOVENOX) 150 mg/mL Syrg Inject 0.5mL(75mg) every 12hrs. 1st dose Monday am 2/10/20. Last Dose Friday  am 2/13/20. Last dose of Coumadin 2/7/2020. Lovenox for 4 days. 4 mL 0    fluticasone furoate-vilanterol (BREO) 100-25 mcg/dose diskus inhaler Inhale 1 puff into the lungs.      furosemide (LASIX) 40 MG tablet Take 40 mg by mouth.      gabapentin (NEURONTIN) 600 MG tablet       HYDROcodone-acetaminophen (NORCO) 5-325 mg per tablet Take 1 tablet by mouth every 6 (six) hours as needed.      hydroxychloroquine (PLAQUENIL) 200 mg tablet Take 200 mg by mouth.      leflunomide (ARAVA) 20 MG Tab       levoFLOXacin (LEVAQUIN) 750 MG tablet TAKE ONE TABLET BY MOUTH EVERY DAY FOR SEVEN DAYS      linaCLOtide (LINZESS) 145 mcg Cap capsule Take 145 mcg by mouth.      lisinopril (PRINIVIL,ZESTRIL) 20 MG tablet Take 20 mg by mouth.      metoprolol succinate (TOPROL-XL) 25 MG 24 hr tablet Take 25 mg by mouth.      MYRBETRIQ 25 mg Tb24 ER tablet       oxyCODONE-acetaminophen (PERCOCET) 5-325 mg per tablet Take 1 tablet by mouth.      OXYGEN-AIR DELIVERY SYSTEMS MISC by Other route.      pantoprazole (PROTONIX) 40 MG tablet Take 40 mg by mouth.      potassium chloride (KLOR-CON) 10 MEQ TbSR Take 20 mEq by mouth.      promethazine (PHENERGAN) 25 MG tablet Take 25 mg by mouth every 6 (six) hours as needed.      ranitidine (ZANTAC) 300 MG tablet Take 300 mg by mouth.      sucralfate (CARAFATE) 1 gram tablet       temazepam (RESTORIL) 7.5 MG Cap Take 7.5 mg by mouth.      thiamine 100 MG tablet Take 100 mg by mouth.      traZODone (DESYREL) 50 MG tablet Take 50 mg by mouth.      VENTOLIN HFA 90 mcg/actuation inhaler       warfarin (COUMADIN) 10 MG tablet Take 10 mg by mouth.      warfarin (COUMADIN) 2 MG tablet       warfarin (COUMADIN) 7.5 MG tablet Take 15 mg by mouth.       No current facility-administered medications on file prior to encounter.        No past surgical history on file.    Social History     Socioeconomic History    Marital status:      Spouse name: Not on file    Number of children:  Not on file    Years of education: Not on file    Highest education level: Not on file   Occupational History    Not on file   Social Needs    Financial resource strain: Not on file    Food insecurity:     Worry: Not on file     Inability: Not on file    Transportation needs:     Medical: Not on file     Non-medical: Not on file   Tobacco Use    Smoking status: Current Every Day Smoker     Types: Cigarettes    Smokeless tobacco: Never Used   Substance and Sexual Activity    Alcohol use: Not Currently    Drug use: Not on file    Sexual activity: Not on file   Lifestyle    Physical activity:     Days per week: Not on file     Minutes per session: Not on file    Stress: Not on file   Relationships    Social connections:     Talks on phone: Not on file     Gets together: Not on file     Attends Sikhism service: Not on file     Active member of club or organization: Not on file     Attends meetings of clubs or organizations: Not on file     Relationship status: Not on file   Other Topics Concern    Not on file   Social History Narrative    Not on file       OBJECTIVE:     Vital Signs Range (Last 24H):  Temp:  [36.7 °C (98.1 °F)]   Pulse:  [93]   Resp:  [16]   BP: (111)/(74)   SpO2:  [98 %]       Significant Labs:  No results found for: WBC, HGB, HCT, PLT, CHOL, TRIG, HDL, LDLDIRECT, ALT, AST, NA, K, CL, CREATININE, BUN, CO2, TSH, PSA, INR, GLUF, HGBA1C, MICROALBUR    Diagnostic Studies: No relevant studies.    EKG:   No results found for this or any previous visit.    2D ECHO: See Report in Media  EF normal 55-60    TTE:  No results found for this or any previous visit.    SHADE:  No results found for this or any previous visit.    ASSESSMENT/PLAN:                                                                                                       Anesthesia Evaluation    I have reviewed the Patient Summary Reports.    I have reviewed the Nursing Notes.   I have reviewed the Medications.     Review of  Systems  Anesthesia Hx:  No problems with previous Anesthesia  History of prior surgery of interest to airway management or planning: heart surgery. Previous anesthesia: General Denies Family Hx of Anesthesia complications.   Denies Personal Hx of Anesthesia complications.   Social:  Smoker    Hematology/Oncology:  Hematology Normal   Oncology Normal     Cardiovascular:   Hypertension Valvular problems/Murmurs, AS hyperlipidemia COOPER    Pulmonary:   COPD, severe    Renal/:  Renal/ Normal     Hepatic/GI:  Hepatic/GI Normal    Musculoskeletal:  Musculoskeletal Normal    Neurological:   CVA, no residual symptoms    Endocrine:  Endocrine Normal    Psych:  Psychiatric Normal           Physical Exam  General:  Well nourished    Airway/Jaw/Neck:  Airway Findings: Mouth Opening: Normal Tongue: Normal  General Airway Assessment: Adult  Mallampati: II  Improves to II with phonation.  TM Distance: Normal, at least 6 cm  Jaw/Neck Findings:  Neck ROM: Normal ROM     Eyes/Ears/Nose:  EYES/EARS/NOSE FINDINGS: Normal   Dental:  Dental Findings: In tact        Mental Status:  Mental Status Findings:  Cooperative, Alert and Oriented         Anesthesia Plan  Type of Anesthesia, risks & benefits discussed:  Anesthesia Type:  general, regional  Patient's Preference:   Intra-op Monitoring Plan: arterial line, central line, [place], cardiac output and standard ASA monitors  Intra-op Monitoring Plan Comments:   Post Op Pain Control Plan: multimodal analgesia, IV/PO Opioids PRN, per primary service following discharge from PACU and peripheral nerve block  Post Op Pain Control Plan Comments:   Induction:   IV  Beta Blocker:         Informed Consent: Patient understands risks and agrees with Anesthesia plan.  Questions answered. Anesthesia consent signed with patient.  ASA Score: 4     Day of Surgery Review of History & Physical:    H&P update referred to the surgeon.         Ready For Surgery From Anesthesia Perspective.

## 2020-02-14 ENCOUNTER — PATIENT MESSAGE (OUTPATIENT)
Dept: SURGERY | Facility: HOSPITAL | Age: 50
End: 2020-02-14

## 2020-02-14 ENCOUNTER — ANESTHESIA (OUTPATIENT)
Dept: SURGERY | Facility: HOSPITAL | Age: 50
DRG: 220 | End: 2020-02-14
Payer: MEDICARE

## 2020-03-05 DIAGNOSIS — Z79.01 LONG TERM CURRENT USE OF ANTICOAGULANT: ICD-10-CM

## 2020-03-05 DIAGNOSIS — Z95.2 S/P AVR: Primary | ICD-10-CM

## 2020-03-06 ENCOUNTER — PATIENT MESSAGE (OUTPATIENT)
Dept: SURGERY | Facility: HOSPITAL | Age: 50
End: 2020-03-06

## 2020-03-10 ENCOUNTER — HOSPITAL ENCOUNTER (INPATIENT)
Facility: HOSPITAL | Age: 50
LOS: 6 days | Discharge: HOME HEALTH | DRG: 220 | End: 2020-03-16
Attending: THORACIC SURGERY (CARDIOTHORACIC VASCULAR SURGERY) | Admitting: THORACIC SURGERY (CARDIOTHORACIC VASCULAR SURGERY)
Payer: MEDICARE

## 2020-03-10 DIAGNOSIS — D69.6 THROMBOCYTOPENIA: ICD-10-CM

## 2020-03-10 DIAGNOSIS — I48.0 PAROXYSMAL ATRIAL FIBRILLATION: ICD-10-CM

## 2020-03-10 DIAGNOSIS — I48.91 ATRIAL FIBRILLATION: ICD-10-CM

## 2020-03-10 DIAGNOSIS — Z98.890 H/O HEART SURGERY: ICD-10-CM

## 2020-03-10 DIAGNOSIS — Z95.2 S/P AVR (AORTIC VALVE REPLACEMENT): Primary | ICD-10-CM

## 2020-03-10 DIAGNOSIS — I95.81 POSTPROCEDURAL HYPOTENSION: ICD-10-CM

## 2020-03-10 DIAGNOSIS — R73.9 ACUTE HYPERGLYCEMIA: ICD-10-CM

## 2020-03-10 DIAGNOSIS — Z95.2 AORTIC VALVE REPLACED: ICD-10-CM

## 2020-03-10 LAB
ABO + RH BLD: NORMAL
ALBUMIN SERPL BCP-MCNC: 1.9 G/DL (ref 3.5–5.2)
ALLENS TEST: ABNORMAL
ALLENS TEST: NORMAL
ALP SERPL-CCNC: 29 U/L (ref 55–135)
ALT SERPL W/O P-5'-P-CCNC: 11 U/L (ref 10–44)
ANION GAP SERPL CALC-SCNC: 6 MMOL/L (ref 8–16)
APTT BLDCRRT: 25.4 SEC (ref 21–32)
AST SERPL-CCNC: 27 U/L (ref 10–40)
BASOPHILS # BLD AUTO: 0.01 K/UL (ref 0–0.2)
BASOPHILS # BLD AUTO: 0.01 K/UL (ref 0–0.2)
BASOPHILS NFR BLD: 0.1 % (ref 0–1.9)
BASOPHILS NFR BLD: 0.3 % (ref 0–1.9)
BILIRUB DIRECT SERPL-MCNC: 0.2 MG/DL (ref 0.1–0.3)
BILIRUB SERPL-MCNC: 0.3 MG/DL (ref 0.1–1)
BLD GP AB SCN CELLS X3 SERPL QL: NORMAL
BUN SERPL-MCNC: 9 MG/DL (ref 6–20)
CALCIUM SERPL-MCNC: 8.2 MG/DL (ref 8.7–10.5)
CHLORIDE SERPL-SCNC: 109 MMOL/L (ref 95–110)
CO2 SERPL-SCNC: 24 MMOL/L (ref 23–29)
CREAT SERPL-MCNC: 0.7 MG/DL (ref 0.5–1.4)
DELSYS: ABNORMAL
DELSYS: NORMAL
DIFFERENTIAL METHOD: ABNORMAL
DIFFERENTIAL METHOD: ABNORMAL
EOSINOPHIL # BLD AUTO: 0 K/UL (ref 0–0.5)
EOSINOPHIL # BLD AUTO: 0 K/UL (ref 0–0.5)
EOSINOPHIL NFR BLD: 0 % (ref 0–8)
EOSINOPHIL NFR BLD: 0.4 % (ref 0–8)
ERYTHROCYTE [DISTWIDTH] IN BLOOD BY AUTOMATED COUNT: 15.2 % (ref 11.5–14.5)
ERYTHROCYTE [DISTWIDTH] IN BLOOD BY AUTOMATED COUNT: 15.7 % (ref 11.5–14.5)
EST. GFR  (AFRICAN AMERICAN): >60 ML/MIN/1.73 M^2
EST. GFR  (NON AFRICAN AMERICAN): >60 ML/MIN/1.73 M^2
FIBRINOGEN PPP-MCNC: 111 MG/DL (ref 182–366)
FIO2: 28
FIO2: 28
FLOW: 2
FLOW: 5
FLOW: 5
FLOW: 7
GLUCOSE SERPL-MCNC: 107 MG/DL (ref 70–110)
GLUCOSE SERPL-MCNC: 131 MG/DL (ref 70–110)
GLUCOSE SERPL-MCNC: 140 MG/DL (ref 70–110)
GLUCOSE SERPL-MCNC: 156 MG/DL (ref 70–110)
GLUCOSE SERPL-MCNC: 156 MG/DL (ref 70–110)
HCO3 UR-SCNC: 20.6 MMOL/L (ref 24–28)
HCO3 UR-SCNC: 20.8 MMOL/L (ref 24–28)
HCO3 UR-SCNC: 21.9 MMOL/L (ref 24–28)
HCO3 UR-SCNC: 22.1 MMOL/L (ref 24–28)
HCO3 UR-SCNC: 23 MMOL/L (ref 24–28)
HCO3 UR-SCNC: 24.5 MMOL/L (ref 24–28)
HCO3 UR-SCNC: 24.8 MMOL/L (ref 24–28)
HCO3 UR-SCNC: 24.8 MMOL/L (ref 24–28)
HCO3 UR-SCNC: 25.4 MMOL/L (ref 24–28)
HCO3 UR-SCNC: 25.5 MMOL/L (ref 24–28)
HCO3 UR-SCNC: 27.6 MMOL/L (ref 24–28)
HCO3 UR-SCNC: 27.8 MMOL/L (ref 24–28)
HCT VFR BLD AUTO: 27.3 % (ref 37–48.5)
HCT VFR BLD AUTO: 34.5 % (ref 37–48.5)
HCT VFR BLD CALC: 18 %PCV (ref 36–54)
HCT VFR BLD CALC: 23 %PCV (ref 36–54)
HCT VFR BLD CALC: 25 %PCV (ref 36–54)
HCT VFR BLD CALC: 25 %PCV (ref 36–54)
HCT VFR BLD CALC: 26 %PCV (ref 36–54)
HCT VFR BLD CALC: 26 %PCV (ref 36–54)
HCT VFR BLD CALC: 27 %PCV (ref 36–54)
HCT VFR BLD CALC: 28 %PCV (ref 36–54)
HCT VFR BLD CALC: 30 %PCV (ref 36–54)
HCT VFR BLD CALC: 30 %PCV (ref 36–54)
HGB BLD-MCNC: 10.7 G/DL (ref 12–16)
HGB BLD-MCNC: 8.6 G/DL (ref 12–16)
IMM GRANULOCYTES # BLD AUTO: 0.01 K/UL (ref 0–0.04)
IMM GRANULOCYTES # BLD AUTO: 0.02 K/UL (ref 0–0.04)
IMM GRANULOCYTES NFR BLD AUTO: 0.2 % (ref 0–0.5)
IMM GRANULOCYTES NFR BLD AUTO: 0.3 % (ref 0–0.5)
INR PPP: 1.1 (ref 0.8–1.2)
INR PPP: 3.2 (ref 0.8–1.2)
LDH SERPL L TO P-CCNC: 0.96 MMOL/L (ref 0.36–1.25)
LDH SERPL L TO P-CCNC: 0.98 MMOL/L (ref 0.36–1.25)
LDH SERPL L TO P-CCNC: 0.99 MMOL/L (ref 0.36–1.25)
LDH SERPL L TO P-CCNC: 0.99 MMOL/L (ref 0.36–1.25)
LDH SERPL L TO P-CCNC: 1.05 MMOL/L (ref 0.36–1.25)
LDH SERPL L TO P-CCNC: 1.36 MMOL/L (ref 0.36–1.25)
LYMPHOCYTES # BLD AUTO: 0.9 K/UL (ref 1–4.8)
LYMPHOCYTES # BLD AUTO: 2.1 K/UL (ref 1–4.8)
LYMPHOCYTES NFR BLD: 21.8 % (ref 18–48)
LYMPHOCYTES NFR BLD: 24.3 % (ref 18–48)
MAGNESIUM SERPL-MCNC: 2.5 MG/DL (ref 1.6–2.6)
MCH RBC QN AUTO: 29.1 PG (ref 27–31)
MCH RBC QN AUTO: 29.7 PG (ref 27–31)
MCHC RBC AUTO-ENTMCNC: 31 G/DL (ref 32–36)
MCHC RBC AUTO-ENTMCNC: 31.5 G/DL (ref 32–36)
MCV RBC AUTO: 92 FL (ref 82–98)
MCV RBC AUTO: 96 FL (ref 82–98)
MODE: ABNORMAL
MODE: NORMAL
MONOCYTES # BLD AUTO: 0.2 K/UL (ref 0.3–1)
MONOCYTES # BLD AUTO: 0.4 K/UL (ref 0.3–1)
MONOCYTES NFR BLD: 4.3 % (ref 4–15)
MONOCYTES NFR BLD: 5.8 % (ref 4–15)
NEUTROPHILS # BLD AUTO: 2.9 K/UL (ref 1.8–7.7)
NEUTROPHILS # BLD AUTO: 6 K/UL (ref 1.8–7.7)
NEUTROPHILS NFR BLD: 70.7 % (ref 38–73)
NEUTROPHILS NFR BLD: 71.8 % (ref 38–73)
NRBC BLD-RTO: 0 /100 WBC
NRBC BLD-RTO: 0 /100 WBC
PCO2 BLDA: 37 MMHG (ref 35–45)
PCO2 BLDA: 38.1 MMHG (ref 35–45)
PCO2 BLDA: 38.7 MMHG (ref 35–45)
PCO2 BLDA: 40 MMHG (ref 35–45)
PCO2 BLDA: 41 MMHG (ref 35–45)
PCO2 BLDA: 41.2 MMHG (ref 35–45)
PCO2 BLDA: 41.8 MMHG (ref 35–45)
PCO2 BLDA: 42.5 MMHG (ref 35–45)
PCO2 BLDA: 45.6 MMHG (ref 35–45)
PCO2 BLDA: 53.7 MMHG (ref 35–45)
PCO2 BLDA: 55.9 MMHG (ref 35–45)
PCO2 BLDA: 71.5 MMHG (ref 35–45)
PH SMN: 7.2 [PH] (ref 7.35–7.45)
PH SMN: 7.24 [PH] (ref 7.35–7.45)
PH SMN: 7.27 [PH] (ref 7.35–7.45)
PH SMN: 7.29 [PH] (ref 7.35–7.45)
PH SMN: 7.29 [PH] (ref 7.35–7.45)
PH SMN: 7.3 [PH] (ref 7.35–7.45)
PH SMN: 7.34 [PH] (ref 7.35–7.45)
PH SMN: 7.4 [PH] (ref 7.35–7.45)
PH SMN: 7.42 [PH] (ref 7.35–7.45)
PH SMN: 7.42 [PH] (ref 7.35–7.45)
PH SMN: 7.43 [PH] (ref 7.35–7.45)
PH SMN: 7.45 [PH] (ref 7.35–7.45)
PHOSPHATE SERPL-MCNC: 4.3 MG/DL (ref 2.7–4.5)
PLATELET # BLD AUTO: 66 K/UL (ref 150–350)
PLATELET # BLD AUTO: 70 K/UL (ref 150–350)
PMV BLD AUTO: 10 FL (ref 9.2–12.9)
PMV BLD AUTO: 10.3 FL (ref 9.2–12.9)
PO2 BLDA: 103 MMHG (ref 80–100)
PO2 BLDA: 224 MMHG (ref 80–100)
PO2 BLDA: 240 MMHG (ref 80–100)
PO2 BLDA: 241 MMHG (ref 80–100)
PO2 BLDA: 27 MMHG (ref 40–60)
PO2 BLDA: 301 MMHG (ref 80–100)
PO2 BLDA: 545 MMHG (ref 80–100)
PO2 BLDA: 67 MMHG (ref 80–100)
PO2 BLDA: 70 MMHG (ref 80–100)
PO2 BLDA: 75 MMHG (ref 80–100)
PO2 BLDA: 78 MMHG (ref 80–100)
PO2 BLDA: 84 MMHG (ref 80–100)
POC BE: -2 MMOL/L
POC BE: -4 MMOL/L
POC BE: -4 MMOL/L
POC BE: -5 MMOL/L
POC BE: -6 MMOL/L
POC BE: -6 MMOL/L
POC BE: 0 MMOL/L
POC BE: 1 MMOL/L
POC BE: 4 MMOL/L
POC IONIZED CALCIUM: 0.96 MMOL/L (ref 1.06–1.42)
POC IONIZED CALCIUM: 0.98 MMOL/L (ref 1.06–1.42)
POC IONIZED CALCIUM: 0.98 MMOL/L (ref 1.06–1.42)
POC IONIZED CALCIUM: 0.99 MMOL/L (ref 1.06–1.42)
POC IONIZED CALCIUM: 1.2 MMOL/L (ref 1.06–1.42)
POC IONIZED CALCIUM: 1.21 MMOL/L (ref 1.06–1.42)
POC IONIZED CALCIUM: 1.22 MMOL/L (ref 1.06–1.42)
POC IONIZED CALCIUM: 1.23 MMOL/L (ref 1.06–1.42)
POC IONIZED CALCIUM: 1.27 MMOL/L (ref 1.06–1.42)
POC IONIZED CALCIUM: 1.38 MMOL/L (ref 1.06–1.42)
POC SATURATED O2: 100 % (ref 95–100)
POC SATURATED O2: 35 % (ref 95–100)
POC SATURATED O2: 91 % (ref 95–100)
POC SATURATED O2: 93 % (ref 95–100)
POC SATURATED O2: 93 % (ref 95–100)
POC SATURATED O2: 94 % (ref 95–100)
POC SATURATED O2: 94 % (ref 95–100)
POC SATURATED O2: 97 % (ref 95–100)
POC TCO2: 22 MMOL/L (ref 23–27)
POC TCO2: 22 MMOL/L (ref 23–27)
POC TCO2: 23 MMOL/L (ref 23–27)
POC TCO2: 23 MMOL/L (ref 23–27)
POC TCO2: 25 MMOL/L (ref 23–27)
POC TCO2: 26 MMOL/L (ref 23–27)
POC TCO2: 27 MMOL/L (ref 23–27)
POC TCO2: 27 MMOL/L (ref 23–27)
POC TCO2: 29 MMOL/L (ref 23–27)
POC TCO2: 30 MMOL/L (ref 24–29)
POCT GLUCOSE: 101 MG/DL (ref 70–110)
POCT GLUCOSE: 114 MG/DL (ref 70–110)
POCT GLUCOSE: 121 MG/DL (ref 70–110)
POCT GLUCOSE: 126 MG/DL (ref 70–110)
POCT GLUCOSE: 129 MG/DL (ref 70–110)
POCT GLUCOSE: 145 MG/DL (ref 70–110)
POCT GLUCOSE: 93 MG/DL (ref 70–110)
POTASSIUM BLD-SCNC: 3.6 MMOL/L (ref 3.5–5.1)
POTASSIUM BLD-SCNC: 4.1 MMOL/L (ref 3.5–5.1)
POTASSIUM BLD-SCNC: 4.2 MMOL/L (ref 3.5–5.1)
POTASSIUM BLD-SCNC: 4.4 MMOL/L (ref 3.5–5.1)
POTASSIUM BLD-SCNC: 4.5 MMOL/L (ref 3.5–5.1)
POTASSIUM BLD-SCNC: 4.7 MMOL/L (ref 3.5–5.1)
POTASSIUM BLD-SCNC: 4.7 MMOL/L (ref 3.5–5.1)
POTASSIUM BLD-SCNC: 4.8 MMOL/L (ref 3.5–5.1)
POTASSIUM BLD-SCNC: 4.8 MMOL/L (ref 3.5–5.1)
POTASSIUM BLD-SCNC: 5 MMOL/L (ref 3.5–5.1)
POTASSIUM SERPL-SCNC: 4.7 MMOL/L (ref 3.5–5.1)
POTASSIUM SERPL-SCNC: 4.7 MMOL/L (ref 3.5–5.1)
PROT SERPL-MCNC: 3.4 G/DL (ref 6–8.4)
PROTHROMBIN TIME: 11.3 SEC (ref 9–12.5)
PROTHROMBIN TIME: 30.7 SEC (ref 9–12.5)
RBC # BLD AUTO: 2.96 M/UL (ref 4–5.4)
RBC # BLD AUTO: 3.6 M/UL (ref 4–5.4)
SAMPLE: ABNORMAL
SAMPLE: NORMAL
SITE: ABNORMAL
SITE: NORMAL
SODIUM BLD-SCNC: 136 MMOL/L (ref 136–145)
SODIUM BLD-SCNC: 137 MMOL/L (ref 136–145)
SODIUM BLD-SCNC: 138 MMOL/L (ref 136–145)
SODIUM BLD-SCNC: 138 MMOL/L (ref 136–145)
SODIUM BLD-SCNC: 141 MMOL/L (ref 136–145)
SODIUM BLD-SCNC: 142 MMOL/L (ref 136–145)
SODIUM BLD-SCNC: 143 MMOL/L (ref 136–145)
SODIUM BLD-SCNC: 143 MMOL/L (ref 136–145)
SODIUM SERPL-SCNC: 139 MMOL/L (ref 136–145)
SP02: 100
SP02: 100
SP02: 93
SP02: 96
WBC # BLD AUTO: 3.99 K/UL (ref 3.9–12.7)
WBC # BLD AUTO: 8.43 K/UL (ref 3.9–12.7)

## 2020-03-10 PROCEDURE — 99900035 HC TECH TIME PER 15 MIN (STAT)

## 2020-03-10 PROCEDURE — 63600175 PHARM REV CODE 636 W HCPCS

## 2020-03-10 PROCEDURE — 76937 US GUIDE VASCULAR ACCESS: CPT | Mod: 26,,, | Performed by: ANESTHESIOLOGY

## 2020-03-10 PROCEDURE — 33405 REPLACEMENT AORTIC VALVE OPN: CPT | Mod: GC,,, | Performed by: THORACIC SURGERY (CARDIOTHORACIC VASCULAR SURGERY)

## 2020-03-10 PROCEDURE — 82330 ASSAY OF CALCIUM: CPT

## 2020-03-10 PROCEDURE — 25000003 PHARM REV CODE 250: Performed by: NURSE PRACTITIONER

## 2020-03-10 PROCEDURE — 63600175 PHARM REV CODE 636 W HCPCS: Performed by: NURSE PRACTITIONER

## 2020-03-10 PROCEDURE — 27200678 HC TRANSDUCER MONITOR KIT TRIPLE: Performed by: ANESTHESIOLOGY

## 2020-03-10 PROCEDURE — 84295 ASSAY OF SERUM SODIUM: CPT

## 2020-03-10 PROCEDURE — 27000221 HC OXYGEN, UP TO 24 HOURS

## 2020-03-10 PROCEDURE — 27200750 HC INSULATED NEEDLE/ STIMUPLEX: Performed by: ANESTHESIOLOGY

## 2020-03-10 PROCEDURE — 85610 PROTHROMBIN TIME: CPT | Mod: 91

## 2020-03-10 PROCEDURE — P9021 RED BLOOD CELLS UNIT: HCPCS

## 2020-03-10 PROCEDURE — 63600175 PHARM REV CODE 636 W HCPCS: Performed by: STUDENT IN AN ORGANIZED HEALTH CARE EDUCATION/TRAINING PROGRAM

## 2020-03-10 PROCEDURE — C1751 CATH, INF, PER/CENT/MIDLINE: HCPCS | Performed by: ANESTHESIOLOGY

## 2020-03-10 PROCEDURE — 85384 FIBRINOGEN ACTIVITY: CPT

## 2020-03-10 PROCEDURE — 84132 ASSAY OF SERUM POTASSIUM: CPT

## 2020-03-10 PROCEDURE — 85730 THROMBOPLASTIN TIME PARTIAL: CPT

## 2020-03-10 PROCEDURE — 84100 ASSAY OF PHOSPHORUS: CPT

## 2020-03-10 PROCEDURE — 36000712 HC OR TIME LEV V 1ST 15 MIN: Performed by: THORACIC SURGERY (CARDIOTHORACIC VASCULAR SURGERY)

## 2020-03-10 PROCEDURE — 93010 EKG 12-LEAD: ICD-10-PCS | Mod: ,,, | Performed by: INTERNAL MEDICINE

## 2020-03-10 PROCEDURE — S4991 NICOTINE PATCH NONLEGEND: HCPCS | Performed by: STUDENT IN AN ORGANIZED HEALTH CARE EDUCATION/TRAINING PROGRAM

## 2020-03-10 PROCEDURE — P9045 ALBUMIN (HUMAN), 5%, 250 ML: HCPCS | Mod: JG | Performed by: STUDENT IN AN ORGANIZED HEALTH CARE EDUCATION/TRAINING PROGRAM

## 2020-03-10 PROCEDURE — 36620 INSERTION CATHETER ARTERY: CPT | Mod: 59,,, | Performed by: ANESTHESIOLOGY

## 2020-03-10 PROCEDURE — 27201423 OPTIME MED/SURG SUP & DEVICES STERILE SUPPLY: Performed by: THORACIC SURGERY (CARDIOTHORACIC VASCULAR SURGERY)

## 2020-03-10 PROCEDURE — 93503 INSERT/PLACE HEART CATHETER: CPT | Mod: 59,,, | Performed by: ANESTHESIOLOGY

## 2020-03-10 PROCEDURE — D9220A PRA ANESTHESIA: ICD-10-PCS | Mod: GC,,, | Performed by: ANESTHESIOLOGY

## 2020-03-10 PROCEDURE — 20000000 HC ICU ROOM

## 2020-03-10 PROCEDURE — 82803 BLOOD GASES ANY COMBINATION: CPT

## 2020-03-10 PROCEDURE — 88300 SURGICAL PATH GROSS: CPT | Performed by: PATHOLOGY

## 2020-03-10 PROCEDURE — 99223 1ST HOSP IP/OBS HIGH 75: CPT | Mod: AI,GC,, | Performed by: SURGERY

## 2020-03-10 PROCEDURE — 93320 PR DOPPLER ECHO HEART,COMPLETE: ICD-10-PCS | Mod: 26,59,, | Performed by: ANESTHESIOLOGY

## 2020-03-10 PROCEDURE — 93325 PR DOPPLER COLOR FLOW VELOCITY MAP: ICD-10-PCS | Mod: 26,59,, | Performed by: ANESTHESIOLOGY

## 2020-03-10 PROCEDURE — 94761 N-INVAS EAR/PLS OXIMETRY MLT: CPT

## 2020-03-10 PROCEDURE — 27100088 HC CELL SAVER

## 2020-03-10 PROCEDURE — 93312 ECHO TRANSESOPHAGEAL: CPT | Mod: 26,59,, | Performed by: ANESTHESIOLOGY

## 2020-03-10 PROCEDURE — C1894 INTRO/SHEATH, NON-LASER: HCPCS | Performed by: ANESTHESIOLOGY

## 2020-03-10 PROCEDURE — 64450 NJX AA&/STRD OTHER PN/BRANCH: CPT | Mod: 59,50,, | Performed by: ANESTHESIOLOGY

## 2020-03-10 PROCEDURE — 85520 HEPARIN ASSAY: CPT

## 2020-03-10 PROCEDURE — 86901 BLOOD TYPING SEROLOGIC RH(D): CPT

## 2020-03-10 PROCEDURE — 76937 PR  US GUIDE, VASCULAR ACCESS: ICD-10-PCS | Mod: 26,,, | Performed by: ANESTHESIOLOGY

## 2020-03-10 PROCEDURE — 88300 PR  SURG PATH,GROSS,LEVEL I: ICD-10-PCS | Mod: 26,,, | Performed by: PATHOLOGY

## 2020-03-10 PROCEDURE — 93320 DOPPLER ECHO COMPLETE: CPT | Mod: 26,59,, | Performed by: ANESTHESIOLOGY

## 2020-03-10 PROCEDURE — 25000003 PHARM REV CODE 250: Performed by: THORACIC SURGERY (CARDIOTHORACIC VASCULAR SURGERY)

## 2020-03-10 PROCEDURE — 83735 ASSAY OF MAGNESIUM: CPT

## 2020-03-10 PROCEDURE — 86920 COMPATIBILITY TEST SPIN: CPT

## 2020-03-10 PROCEDURE — 37000009 HC ANESTHESIA EA ADD 15 MINS: Performed by: THORACIC SURGERY (CARDIOTHORACIC VASCULAR SURGERY)

## 2020-03-10 PROCEDURE — 94799 UNLISTED PULMONARY SVC/PX: CPT

## 2020-03-10 PROCEDURE — 27000191 HC C-V MONITORING

## 2020-03-10 PROCEDURE — 88300 SURGICAL PATH GROSS: CPT | Mod: 26,,, | Performed by: PATHOLOGY

## 2020-03-10 PROCEDURE — 33405 PR REPLACE AORT VALV,PROSTH VALV: ICD-10-PCS | Mod: GC,,, | Performed by: THORACIC SURGERY (CARDIOTHORACIC VASCULAR SURGERY)

## 2020-03-10 PROCEDURE — 80076 HEPATIC FUNCTION PANEL: CPT

## 2020-03-10 PROCEDURE — 25000003 PHARM REV CODE 250: Performed by: SURGERY

## 2020-03-10 PROCEDURE — 85025 COMPLETE CBC W/AUTO DIFF WBC: CPT

## 2020-03-10 PROCEDURE — 27100025 HC TUBING, SET FLUID WARMER: Performed by: ANESTHESIOLOGY

## 2020-03-10 PROCEDURE — 64450 PR NERVE BLOCK INJ, ANES/STEROID, OTHER PERIPHERAL: ICD-10-PCS | Mod: 59,50,, | Performed by: ANESTHESIOLOGY

## 2020-03-10 PROCEDURE — 85014 HEMATOCRIT: CPT

## 2020-03-10 PROCEDURE — 99223 PR INITIAL HOSPITAL CARE,LEVL III: ICD-10-PCS | Mod: AI,GC,, | Performed by: SURGERY

## 2020-03-10 PROCEDURE — 93010 ELECTROCARDIOGRAM REPORT: CPT | Mod: ,,, | Performed by: INTERNAL MEDICINE

## 2020-03-10 PROCEDURE — 99223 PR INITIAL HOSPITAL CARE,LEVL III: ICD-10-PCS | Mod: ,,, | Performed by: NURSE PRACTITIONER

## 2020-03-10 PROCEDURE — D9220A PRA ANESTHESIA: Mod: GC,,, | Performed by: ANESTHESIOLOGY

## 2020-03-10 PROCEDURE — 85610 PROTHROMBIN TIME: CPT

## 2020-03-10 PROCEDURE — 33530 PR CABG, REOPERATE >1 MON ORIG: ICD-10-PCS | Mod: GC,,, | Performed by: THORACIC SURGERY (CARDIOTHORACIC VASCULAR SURGERY)

## 2020-03-10 PROCEDURE — 93503 PR INSERT/PLACE FLOW DIRECT CATH: ICD-10-PCS | Mod: 59,,, | Performed by: ANESTHESIOLOGY

## 2020-03-10 PROCEDURE — 93312 PR ECHO HEART,TRANSESOPHAGEAL: ICD-10-PCS | Mod: 26,59,, | Performed by: ANESTHESIOLOGY

## 2020-03-10 PROCEDURE — 63600175 PHARM REV CODE 636 W HCPCS: Performed by: SURGERY

## 2020-03-10 PROCEDURE — C1729 CATH, DRAINAGE: HCPCS | Performed by: THORACIC SURGERY (CARDIOTHORACIC VASCULAR SURGERY)

## 2020-03-10 PROCEDURE — 36620 PR INSERT CATH,ART,PERCUT,SHORTTERM: ICD-10-PCS | Mod: 59,,, | Performed by: ANESTHESIOLOGY

## 2020-03-10 PROCEDURE — 33530 CORONARY ARTERY BYPASS/REOP: CPT | Mod: GC,,, | Performed by: THORACIC SURGERY (CARDIOTHORACIC VASCULAR SURGERY)

## 2020-03-10 PROCEDURE — 27000175 HC ADULT BYPASS PUMP

## 2020-03-10 PROCEDURE — 83605 ASSAY OF LACTIC ACID: CPT

## 2020-03-10 PROCEDURE — 27200953 HC CARDIOPLEGIA SYSTEM

## 2020-03-10 PROCEDURE — 80048 BASIC METABOLIC PNL TOTAL CA: CPT

## 2020-03-10 PROCEDURE — 37000008 HC ANESTHESIA 1ST 15 MINUTES: Performed by: THORACIC SURGERY (CARDIOTHORACIC VASCULAR SURGERY)

## 2020-03-10 PROCEDURE — 93325 DOPPLER ECHO COLOR FLOW MAPG: CPT | Mod: 26,59,, | Performed by: ANESTHESIOLOGY

## 2020-03-10 PROCEDURE — 27801617 HC VALVE, PERCEVAL

## 2020-03-10 PROCEDURE — 37799 UNLISTED PX VASCULAR SURGERY: CPT

## 2020-03-10 PROCEDURE — 99223 1ST HOSP IP/OBS HIGH 75: CPT | Mod: ,,, | Performed by: NURSE PRACTITIONER

## 2020-03-10 PROCEDURE — 27201037 HC PRESSURE MONITORING SET UP

## 2020-03-10 PROCEDURE — 36000713 HC OR TIME LEV V EA ADD 15 MIN: Performed by: THORACIC SURGERY (CARDIOTHORACIC VASCULAR SURGERY)

## 2020-03-10 PROCEDURE — 25000003 PHARM REV CODE 250: Performed by: STUDENT IN AN ORGANIZED HEALTH CARE EDUCATION/TRAINING PROGRAM

## 2020-03-10 PROCEDURE — 93005 ELECTROCARDIOGRAM TRACING: CPT

## 2020-03-10 RX ORDER — ACETAMINOPHEN 325 MG/1
650 TABLET ORAL EVERY 4 HOURS PRN
Status: DISCONTINUED | OUTPATIENT
Start: 2020-03-10 | End: 2020-03-11

## 2020-03-10 RX ORDER — NICOTINE 7MG/24HR
1 PATCH, TRANSDERMAL 24 HOURS TRANSDERMAL DAILY
Status: DISCONTINUED | OUTPATIENT
Start: 2020-03-10 | End: 2020-03-16 | Stop reason: HOSPADM

## 2020-03-10 RX ORDER — PANTOPRAZOLE SODIUM 40 MG/10ML
40 INJECTION, POWDER, LYOPHILIZED, FOR SOLUTION INTRAVENOUS DAILY
Status: DISCONTINUED | OUTPATIENT
Start: 2020-03-11 | End: 2020-03-11

## 2020-03-10 RX ORDER — BISACODYL 10 MG
10 SUPPOSITORY, RECTAL RECTAL EVERY 12 HOURS PRN
Status: DISCONTINUED | OUTPATIENT
Start: 2020-03-10 | End: 2020-03-16 | Stop reason: HOSPADM

## 2020-03-10 RX ORDER — TRANEXAMIC ACID 100 MG/ML
INJECTION, SOLUTION INTRAVENOUS
Status: DISCONTINUED | OUTPATIENT
Start: 2020-03-10 | End: 2020-03-10

## 2020-03-10 RX ORDER — KETAMINE HYDROCHLORIDE 10 MG/ML
INJECTION, SOLUTION INTRAMUSCULAR; INTRAVENOUS
Status: DISCONTINUED | OUTPATIENT
Start: 2020-03-10 | End: 2020-03-10

## 2020-03-10 RX ORDER — GLUCAGON 1 MG
1 KIT INJECTION
Status: DISCONTINUED | OUTPATIENT
Start: 2020-03-10 | End: 2020-03-13

## 2020-03-10 RX ORDER — POLYETHYLENE GLYCOL 3350 17 G/17G
17 POWDER, FOR SOLUTION ORAL DAILY
Status: DISCONTINUED | OUTPATIENT
Start: 2020-03-10 | End: 2020-03-16 | Stop reason: HOSPADM

## 2020-03-10 RX ORDER — FENTANYL CITRATE 50 UG/ML
INJECTION, SOLUTION INTRAMUSCULAR; INTRAVENOUS
Status: COMPLETED
Start: 2020-03-10 | End: 2020-03-10

## 2020-03-10 RX ORDER — ACETAMINOPHEN 10 MG/ML
1000 INJECTION, SOLUTION INTRAVENOUS ONCE
Status: COMPLETED | OUTPATIENT
Start: 2020-03-10 | End: 2020-03-10

## 2020-03-10 RX ORDER — LIDOCAINE HYDROCHLORIDE 10 MG/ML
1 INJECTION, SOLUTION EPIDURAL; INFILTRATION; INTRACAUDAL; PERINEURAL
Status: COMPLETED | OUTPATIENT
Start: 2020-03-10 | End: 2020-03-10

## 2020-03-10 RX ORDER — OXYCODONE HYDROCHLORIDE 5 MG/1
5 TABLET ORAL EVERY 4 HOURS PRN
Status: DISCONTINUED | OUTPATIENT
Start: 2020-03-10 | End: 2020-03-11

## 2020-03-10 RX ORDER — DEXTROSE MONOHYDRATE, SODIUM CHLORIDE, AND POTASSIUM CHLORIDE 50; 1.49; 9 G/1000ML; G/1000ML; G/1000ML
5 INJECTION, SOLUTION INTRAVENOUS CONTINUOUS
Status: DISCONTINUED | OUTPATIENT
Start: 2020-03-10 | End: 2020-03-11

## 2020-03-10 RX ORDER — ONDANSETRON 2 MG/ML
INJECTION INTRAMUSCULAR; INTRAVENOUS
Status: DISCONTINUED | OUTPATIENT
Start: 2020-03-10 | End: 2020-03-10

## 2020-03-10 RX ORDER — SODIUM CHLORIDE 9 MG/ML
INJECTION, SOLUTION INTRAVENOUS CONTINUOUS
Status: DISCONTINUED | OUTPATIENT
Start: 2020-03-10 | End: 2020-03-11

## 2020-03-10 RX ORDER — IBUPROFEN 200 MG
24 TABLET ORAL
Status: DISCONTINUED | OUTPATIENT
Start: 2020-03-10 | End: 2020-03-13

## 2020-03-10 RX ORDER — TRANEXAMIC ACID 100 MG/ML
INJECTION, SOLUTION INTRAVENOUS CONTINUOUS PRN
Status: DISCONTINUED | OUTPATIENT
Start: 2020-03-10 | End: 2020-03-10

## 2020-03-10 RX ORDER — MIDAZOLAM HYDROCHLORIDE 1 MG/ML
INJECTION INTRAMUSCULAR; INTRAVENOUS
Status: DISCONTINUED | OUTPATIENT
Start: 2020-03-10 | End: 2020-03-10

## 2020-03-10 RX ORDER — NITROGLYCERIN 5 MG/ML
INJECTION, SOLUTION INTRAVENOUS
Status: DISCONTINUED | OUTPATIENT
Start: 2020-03-10 | End: 2020-03-10

## 2020-03-10 RX ORDER — POTASSIUM CHLORIDE 29.8 MG/ML
40 INJECTION INTRAVENOUS
Status: DISCONTINUED | OUTPATIENT
Start: 2020-03-10 | End: 2020-03-13

## 2020-03-10 RX ORDER — DEXMEDETOMIDINE HYDROCHLORIDE 100 UG/ML
INJECTION, SOLUTION INTRAVENOUS
Status: DISCONTINUED | OUTPATIENT
Start: 2020-03-10 | End: 2020-03-10

## 2020-03-10 RX ORDER — NICARDIPINE HYDROCHLORIDE 0.2 MG/ML
5 INJECTION INTRAVENOUS CONTINUOUS
Status: DISCONTINUED | OUTPATIENT
Start: 2020-03-10 | End: 2020-03-12

## 2020-03-10 RX ORDER — ATORVASTATIN CALCIUM 10 MG/1
10 TABLET, FILM COATED ORAL NIGHTLY
Status: DISCONTINUED | OUTPATIENT
Start: 2020-03-10 | End: 2020-03-16 | Stop reason: HOSPADM

## 2020-03-10 RX ORDER — NOREPINEPHRINE BITARTRATE/D5W 4MG/250ML
0.02 PLASTIC BAG, INJECTION (ML) INTRAVENOUS CONTINUOUS
Status: DISCONTINUED | OUTPATIENT
Start: 2020-03-10 | End: 2020-03-12

## 2020-03-10 RX ORDER — FENTANYL CITRATE 50 UG/ML
25 INJECTION, SOLUTION INTRAMUSCULAR; INTRAVENOUS ONCE
Status: COMPLETED | OUTPATIENT
Start: 2020-03-10 | End: 2020-03-10

## 2020-03-10 RX ORDER — PROTAMINE SULFATE 10 MG/ML
INJECTION, SOLUTION INTRAVENOUS
Status: DISCONTINUED | OUTPATIENT
Start: 2020-03-10 | End: 2020-03-10

## 2020-03-10 RX ORDER — POTASSIUM CHLORIDE 14.9 MG/ML
20 INJECTION INTRAVENOUS
Status: DISCONTINUED | OUTPATIENT
Start: 2020-03-10 | End: 2020-03-13

## 2020-03-10 RX ORDER — ALBUMIN HUMAN 50 G/1000ML
25 SOLUTION INTRAVENOUS ONCE
Status: COMPLETED | OUTPATIENT
Start: 2020-03-10 | End: 2020-03-10

## 2020-03-10 RX ORDER — HYDROMORPHONE HYDROCHLORIDE 1 MG/ML
0.5 INJECTION, SOLUTION INTRAMUSCULAR; INTRAVENOUS; SUBCUTANEOUS EVERY 30 MIN PRN
Status: DISCONTINUED | OUTPATIENT
Start: 2020-03-10 | End: 2020-03-10

## 2020-03-10 RX ORDER — METOPROLOL TARTRATE 25 MG/1
25 TABLET, FILM COATED ORAL
Status: COMPLETED | OUTPATIENT
Start: 2020-03-10 | End: 2020-03-10

## 2020-03-10 RX ORDER — POTASSIUM CHLORIDE 14.9 MG/ML
60 INJECTION INTRAVENOUS
Status: DISCONTINUED | OUTPATIENT
Start: 2020-03-10 | End: 2020-03-13

## 2020-03-10 RX ORDER — ASPIRIN 325 MG
325 TABLET ORAL ONCE
Status: COMPLETED | OUTPATIENT
Start: 2020-03-10 | End: 2020-03-10

## 2020-03-10 RX ORDER — ROCURONIUM BROMIDE 10 MG/ML
INJECTION, SOLUTION INTRAVENOUS
Status: DISCONTINUED | OUTPATIENT
Start: 2020-03-10 | End: 2020-03-10

## 2020-03-10 RX ORDER — CITALOPRAM 20 MG/1
40 TABLET, FILM COATED ORAL DAILY
Status: DISCONTINUED | OUTPATIENT
Start: 2020-03-11 | End: 2020-03-16 | Stop reason: HOSPADM

## 2020-03-10 RX ORDER — OXYCODONE HYDROCHLORIDE 10 MG/1
10 TABLET ORAL EVERY 4 HOURS PRN
Status: DISCONTINUED | OUTPATIENT
Start: 2020-03-10 | End: 2020-03-11

## 2020-03-10 RX ORDER — VANCOMYCIN HCL IN 5 % DEXTROSE 1G/250ML
1000 PLASTIC BAG, INJECTION (ML) INTRAVENOUS
Status: COMPLETED | OUTPATIENT
Start: 2020-03-10 | End: 2020-03-10

## 2020-03-10 RX ORDER — ACETAMINOPHEN 325 MG/1
650 TABLET ORAL EVERY 6 HOURS PRN
Status: DISCONTINUED | OUTPATIENT
Start: 2020-03-10 | End: 2020-03-16 | Stop reason: HOSPADM

## 2020-03-10 RX ORDER — FENTANYL CITRATE 50 UG/ML
INJECTION, SOLUTION INTRAMUSCULAR; INTRAVENOUS
Status: DISCONTINUED | OUTPATIENT
Start: 2020-03-10 | End: 2020-03-10

## 2020-03-10 RX ORDER — LIDOCAINE HCL/PF 100 MG/5ML
SYRINGE (ML) INTRAVENOUS
Status: DISCONTINUED | OUTPATIENT
Start: 2020-03-10 | End: 2020-03-10

## 2020-03-10 RX ORDER — NOREPINEPHRINE BITARTRATE 1 MG/ML
INJECTION, SOLUTION INTRAVENOUS
Status: DISCONTINUED | OUTPATIENT
Start: 2020-03-10 | End: 2020-03-10

## 2020-03-10 RX ORDER — ESMOLOL HYDROCHLORIDE 10 MG/ML
INJECTION INTRAVENOUS
Status: DISCONTINUED | OUTPATIENT
Start: 2020-03-10 | End: 2020-03-10

## 2020-03-10 RX ORDER — IBUPROFEN 200 MG
16 TABLET ORAL
Status: DISCONTINUED | OUTPATIENT
Start: 2020-03-10 | End: 2020-03-13

## 2020-03-10 RX ORDER — MAGNESIUM SULFATE HEPTAHYDRATE 40 MG/ML
4 INJECTION, SOLUTION INTRAVENOUS
Status: DISCONTINUED | OUTPATIENT
Start: 2020-03-10 | End: 2020-03-13

## 2020-03-10 RX ORDER — NICOTINE 7MG/24HR
1 PATCH, TRANSDERMAL 24 HOURS TRANSDERMAL DAILY
Status: DISCONTINUED | OUTPATIENT
Start: 2020-03-11 | End: 2020-03-10

## 2020-03-10 RX ORDER — CEFAZOLIN SODIUM 1 G/3ML
2 INJECTION, POWDER, FOR SOLUTION INTRAMUSCULAR; INTRAVENOUS
Status: COMPLETED | OUTPATIENT
Start: 2020-03-10 | End: 2020-03-11

## 2020-03-10 RX ORDER — PROPOFOL 10 MG/ML
VIAL (ML) INTRAVENOUS
Status: DISCONTINUED | OUTPATIENT
Start: 2020-03-10 | End: 2020-03-10

## 2020-03-10 RX ORDER — MUPIROCIN 20 MG/G
1 OINTMENT TOPICAL
Status: COMPLETED | OUTPATIENT
Start: 2020-03-10 | End: 2020-03-10

## 2020-03-10 RX ORDER — INSULIN ASPART 100 [IU]/ML
0-5 INJECTION, SOLUTION INTRAVENOUS; SUBCUTANEOUS
Status: DISCONTINUED | OUTPATIENT
Start: 2020-03-10 | End: 2020-03-13

## 2020-03-10 RX ORDER — MAGNESIUM SULFATE HEPTAHYDRATE 40 MG/ML
2 INJECTION, SOLUTION INTRAVENOUS
Status: DISCONTINUED | OUTPATIENT
Start: 2020-03-10 | End: 2020-03-13

## 2020-03-10 RX ORDER — HYDROMORPHONE HYDROCHLORIDE 1 MG/ML
0.5 INJECTION, SOLUTION INTRAMUSCULAR; INTRAVENOUS; SUBCUTANEOUS ONCE
Status: DISCONTINUED | OUTPATIENT
Start: 2020-03-10 | End: 2020-03-10

## 2020-03-10 RX ORDER — MUPIROCIN 20 MG/G
1 OINTMENT TOPICAL 2 TIMES DAILY
Status: DISPENSED | OUTPATIENT
Start: 2020-03-10 | End: 2020-03-15

## 2020-03-10 RX ORDER — TRAZODONE HYDROCHLORIDE 50 MG/1
50 TABLET ORAL NIGHTLY
Status: DISCONTINUED | OUTPATIENT
Start: 2020-03-10 | End: 2020-03-16 | Stop reason: HOSPADM

## 2020-03-10 RX ORDER — HEPARIN SODIUM 1000 [USP'U]/ML
INJECTION, SOLUTION INTRAVENOUS; SUBCUTANEOUS
Status: DISCONTINUED | OUTPATIENT
Start: 2020-03-10 | End: 2020-03-10

## 2020-03-10 RX ORDER — BACITRACIN 50000 [IU]/1
INJECTION, POWDER, FOR SOLUTION INTRAMUSCULAR
Status: DISCONTINUED | OUTPATIENT
Start: 2020-03-10 | End: 2020-03-10 | Stop reason: HOSPADM

## 2020-03-10 RX ORDER — ACETAMINOPHEN 500 MG
1000 TABLET ORAL ONCE
Status: DISCONTINUED | OUTPATIENT
Start: 2020-03-10 | End: 2020-03-11

## 2020-03-10 RX ORDER — ASPIRIN 325 MG
325 TABLET ORAL DAILY
Status: DISCONTINUED | OUTPATIENT
Start: 2020-03-11 | End: 2020-03-16 | Stop reason: HOSPADM

## 2020-03-10 RX ORDER — ONDANSETRON 4 MG/1
8 TABLET, ORALLY DISINTEGRATING ORAL EVERY 8 HOURS PRN
Status: DISCONTINUED | OUTPATIENT
Start: 2020-03-10 | End: 2020-03-16 | Stop reason: HOSPADM

## 2020-03-10 RX ORDER — HEPARIN SODIUM 5000 [USP'U]/ML
5000 INJECTION, SOLUTION INTRAVENOUS; SUBCUTANEOUS EVERY 8 HOURS
Status: DISCONTINUED | OUTPATIENT
Start: 2020-03-11 | End: 2020-03-10

## 2020-03-10 RX ORDER — HYDROMORPHONE HYDROCHLORIDE 1 MG/ML
0.5 INJECTION, SOLUTION INTRAMUSCULAR; INTRAVENOUS; SUBCUTANEOUS EVERY 30 MIN PRN
Status: DISCONTINUED | OUTPATIENT
Start: 2020-03-10 | End: 2020-03-11

## 2020-03-10 RX ORDER — ONDANSETRON 2 MG/ML
4 INJECTION INTRAMUSCULAR; INTRAVENOUS EVERY 12 HOURS PRN
Status: DISCONTINUED | OUTPATIENT
Start: 2020-03-10 | End: 2020-03-12

## 2020-03-10 RX ORDER — PROPOFOL 10 MG/ML
5 INJECTION, EMULSION INTRAVENOUS CONTINUOUS
Status: DISCONTINUED | OUTPATIENT
Start: 2020-03-10 | End: 2020-03-11

## 2020-03-10 RX ORDER — HYDROMORPHONE HYDROCHLORIDE 1 MG/ML
0.2 INJECTION, SOLUTION INTRAMUSCULAR; INTRAVENOUS; SUBCUTANEOUS EVERY 30 MIN PRN
Status: DISCONTINUED | OUTPATIENT
Start: 2020-03-10 | End: 2020-03-10

## 2020-03-10 RX ADMIN — FENTANYL CITRATE 100 MCG: 50 INJECTION, SOLUTION INTRAMUSCULAR; INTRAVENOUS at 08:03

## 2020-03-10 RX ADMIN — FENTANYL CITRATE 25 MCG: 50 INJECTION, SOLUTION INTRAMUSCULAR; INTRAVENOUS at 02:03

## 2020-03-10 RX ADMIN — KETAMINE HYDROCHLORIDE 30 MG: 10 INJECTION, SOLUTION INTRAMUSCULAR; INTRAVENOUS at 07:03

## 2020-03-10 RX ADMIN — FENTANYL CITRATE 100 MCG: 50 INJECTION, SOLUTION INTRAMUSCULAR; INTRAVENOUS at 07:03

## 2020-03-10 RX ADMIN — PROPOFOL 50 MG: 10 INJECTION, EMULSION INTRAVENOUS at 07:03

## 2020-03-10 RX ADMIN — ESMOLOL HYDROCHLORIDE 30 MG: 10 INJECTION INTRAVENOUS at 01:03

## 2020-03-10 RX ADMIN — PROPOFOL 50 MG: 10 INJECTION, EMULSION INTRAVENOUS at 02:03

## 2020-03-10 RX ADMIN — VANCOMYCIN HYDROCHLORIDE 1000 MG: 1 INJECTION, POWDER, LYOPHILIZED, FOR SOLUTION INTRAVENOUS at 08:03

## 2020-03-10 RX ADMIN — DEXMEDETOMIDINE HYDROCHLORIDE 4 MCG: 100 INJECTION, SOLUTION, CONCENTRATE INTRAVENOUS at 02:03

## 2020-03-10 RX ADMIN — HYDROMORPHONE HYDROCHLORIDE 0.5 MG: 1 INJECTION, SOLUTION INTRAMUSCULAR; INTRAVENOUS; SUBCUTANEOUS at 11:03

## 2020-03-10 RX ADMIN — ESMOLOL HYDROCHLORIDE 50 MG: 10 INJECTION INTRAVENOUS at 08:03

## 2020-03-10 RX ADMIN — CALCIUM CHLORIDE 0.5 G: 100 INJECTION, SOLUTION INTRAVENOUS at 01:03

## 2020-03-10 RX ADMIN — DEXMEDETOMIDINE HYDROCHLORIDE 20 MCG: 100 INJECTION, SOLUTION, CONCENTRATE INTRAVENOUS at 02:03

## 2020-03-10 RX ADMIN — DEXMEDETOMIDINE HYDROCHLORIDE 4 MCG: 100 INJECTION, SOLUTION, CONCENTRATE INTRAVENOUS at 01:03

## 2020-03-10 RX ADMIN — TRAZODONE HYDROCHLORIDE 50 MG: 50 TABLET ORAL at 09:03

## 2020-03-10 RX ADMIN — NICOTINE 1 PATCH: 7 PATCH, EXTENDED RELEASE TRANSDERMAL at 10:03

## 2020-03-10 RX ADMIN — OXYCODONE HYDROCHLORIDE 10 MG: 10 TABLET ORAL at 11:03

## 2020-03-10 RX ADMIN — METOPROLOL TARTRATE 25 MG: 25 TABLET ORAL at 06:03

## 2020-03-10 RX ADMIN — MUPIROCIN 1 G: 20 OINTMENT TOPICAL at 06:03

## 2020-03-10 RX ADMIN — HYDROMORPHONE HYDROCHLORIDE 0.2 MG: 1 INJECTION, SOLUTION INTRAMUSCULAR; INTRAVENOUS; SUBCUTANEOUS at 03:03

## 2020-03-10 RX ADMIN — MUPIROCIN 1 G: 20 OINTMENT TOPICAL at 10:03

## 2020-03-10 RX ADMIN — TRANEXAMIC ACID 1000 MG: 100 INJECTION, SOLUTION INTRAVENOUS at 07:03

## 2020-03-10 RX ADMIN — ALBUMIN (HUMAN) 25 G: 12.5 SOLUTION INTRAVENOUS at 05:03

## 2020-03-10 RX ADMIN — SODIUM CHLORIDE: 0.9 INJECTION, SOLUTION INTRAVENOUS at 07:03

## 2020-03-10 RX ADMIN — CALCIUM CHLORIDE 1 G: 100 INJECTION, SOLUTION INTRAVENOUS at 08:03

## 2020-03-10 RX ADMIN — MIDAZOLAM HYDROCHLORIDE 2 MG: 1 INJECTION, SOLUTION INTRAMUSCULAR; INTRAVENOUS at 07:03

## 2020-03-10 RX ADMIN — KETAMINE HYDROCHLORIDE 20 MG: 10 INJECTION, SOLUTION INTRAMUSCULAR; INTRAVENOUS at 08:03

## 2020-03-10 RX ADMIN — LIDOCAINE HYDROCHLORIDE 100 MG: 20 INJECTION, SOLUTION INTRAVENOUS at 12:03

## 2020-03-10 RX ADMIN — ATORVASTATIN CALCIUM 10 MG: 10 TABLET, FILM COATED ORAL at 09:03

## 2020-03-10 RX ADMIN — HYDROMORPHONE HYDROCHLORIDE 0.5 MG: 1 INJECTION, SOLUTION INTRAMUSCULAR; INTRAVENOUS; SUBCUTANEOUS at 04:03

## 2020-03-10 RX ADMIN — FENTANYL CITRATE 25 MCG: 50 INJECTION INTRAMUSCULAR; INTRAVENOUS at 03:03

## 2020-03-10 RX ADMIN — ALBUMIN (HUMAN) 25 G: 12.5 SOLUTION INTRAVENOUS at 03:03

## 2020-03-10 RX ADMIN — ROCURONIUM BROMIDE 100 MG: 10 INJECTION, SOLUTION INTRAVENOUS at 07:03

## 2020-03-10 RX ADMIN — EPINEPHRINE 0.02 MCG/KG/MIN: 1 INJECTION INTRAMUSCULAR; INTRAVENOUS; SUBCUTANEOUS at 12:03

## 2020-03-10 RX ADMIN — SUGAMMADEX 200 MG: 100 INJECTION, SOLUTION INTRAVENOUS at 01:03

## 2020-03-10 RX ADMIN — CEFAZOLIN 2 G: 1 INJECTION, POWDER, FOR SOLUTION INTRAMUSCULAR; INTRAVENOUS at 10:03

## 2020-03-10 RX ADMIN — NOREPINEPHRINE BITARTRATE 8 MCG: 1 INJECTION, SOLUTION, CONCENTRATE INTRAVENOUS at 10:03

## 2020-03-10 RX ADMIN — SODIUM CHLORIDE, SODIUM GLUCONATE, SODIUM ACETATE, POTASSIUM CHLORIDE, MAGNESIUM CHLORIDE, SODIUM PHOSPHATE, DIBASIC, AND POTASSIUM PHOSPHATE: .53; .5; .37; .037; .03; .012; .00082 INJECTION, SOLUTION INTRAVENOUS at 07:03

## 2020-03-10 RX ADMIN — PROTAMINE SULFATE 230 MG: 10 INJECTION, SOLUTION INTRAVENOUS at 01:03

## 2020-03-10 RX ADMIN — OXYCODONE HYDROCHLORIDE 10 MG: 10 TABLET ORAL at 06:03

## 2020-03-10 RX ADMIN — NOREPINEPHRINE BITARTRATE 8 MCG: 1 INJECTION, SOLUTION, CONCENTRATE INTRAVENOUS at 09:03

## 2020-03-10 RX ADMIN — HYDROMORPHONE HYDROCHLORIDE 0.5 MG: 1 INJECTION, SOLUTION INTRAMUSCULAR; INTRAVENOUS; SUBCUTANEOUS at 09:03

## 2020-03-10 RX ADMIN — DEXTROSE MONOHYDRATE, SODIUM CHLORIDE, AND POTASSIUM CHLORIDE 5 ML/HR: 50; 9; 1.49 INJECTION, SOLUTION INTRAVENOUS at 03:03

## 2020-03-10 RX ADMIN — HEPARIN SODIUM 24000 UNITS: 1000 INJECTION, SOLUTION INTRAVENOUS; SUBCUTANEOUS at 10:03

## 2020-03-10 RX ADMIN — ACETAMINOPHEN 1000 MG: 10 INJECTION, SOLUTION INTRAVENOUS at 03:03

## 2020-03-10 RX ADMIN — ROCURONIUM BROMIDE 100 MG: 10 INJECTION, SOLUTION INTRAVENOUS at 08:03

## 2020-03-10 RX ADMIN — NITROGLYCERIN 25 MCG: 5 INJECTION, SOLUTION INTRAVENOUS at 12:03

## 2020-03-10 RX ADMIN — CEFAZOLIN 2 G: 1 INJECTION, POWDER, FOR SOLUTION INTRAMUSCULAR; INTRAVENOUS at 03:03

## 2020-03-10 RX ADMIN — ASPIRIN 325 MG ORAL TABLET 325 MG: 325 PILL ORAL at 05:03

## 2020-03-10 RX ADMIN — NITROGLYCERIN 25 MCG: 5 INJECTION, SOLUTION INTRAVENOUS at 01:03

## 2020-03-10 RX ADMIN — METHOCARBAMOL 500 MG: 100 INJECTION, SOLUTION INTRAMUSCULAR; INTRAVENOUS at 10:03

## 2020-03-10 RX ADMIN — LIDOCAINE HYDROCHLORIDE 10 MG: 10 INJECTION, SOLUTION EPIDURAL; INFILTRATION; INTRACAUDAL; PERINEURAL at 06:03

## 2020-03-10 RX ADMIN — SODIUM CHLORIDE, SODIUM GLUCONATE, SODIUM ACETATE, POTASSIUM CHLORIDE, MAGNESIUM CHLORIDE, SODIUM PHOSPHATE, DIBASIC, AND POTASSIUM PHOSPHATE: .53; .5; .37; .037; .03; .012; .00082 INJECTION, SOLUTION INTRAVENOUS at 09:03

## 2020-03-10 RX ADMIN — CALCIUM CHLORIDE 1 G: 100 INJECTION, SOLUTION INTRAVENOUS at 12:03

## 2020-03-10 RX ADMIN — SODIUM CHLORIDE: 0.9 INJECTION, SOLUTION INTRAVENOUS at 06:03

## 2020-03-10 RX ADMIN — HYDROMORPHONE HYDROCHLORIDE 0.2 MG: 1 INJECTION, SOLUTION INTRAMUSCULAR; INTRAVENOUS; SUBCUTANEOUS at 04:03

## 2020-03-10 RX ADMIN — LIDOCAINE HYDROCHLORIDE 100 MG: 20 INJECTION, SOLUTION INTRAVENOUS at 09:03

## 2020-03-10 RX ADMIN — FENTANYL CITRATE 25 MCG: 50 INJECTION, SOLUTION INTRAMUSCULAR; INTRAVENOUS at 03:03

## 2020-03-10 RX ADMIN — TRANEXAMIC ACID 1 MG/KG/HR: 100 INJECTION, SOLUTION INTRAVENOUS at 07:03

## 2020-03-10 RX ADMIN — ESMOLOL HYDROCHLORIDE 20 MG: 10 INJECTION INTRAVENOUS at 09:03

## 2020-03-10 RX ADMIN — LIDOCAINE HYDROCHLORIDE 100 MG: 20 INJECTION, SOLUTION INTRAVENOUS at 07:03

## 2020-03-10 RX ADMIN — HYDROMORPHONE HYDROCHLORIDE 0.5 MG: 1 INJECTION, SOLUTION INTRAMUSCULAR; INTRAVENOUS; SUBCUTANEOUS at 06:03

## 2020-03-10 RX ADMIN — NOREPINEPHRINE BITARTRATE 0.02 MCG/KG/MIN: 1 INJECTION, SOLUTION, CONCENTRATE INTRAVENOUS at 07:03

## 2020-03-10 RX ADMIN — ESMOLOL HYDROCHLORIDE 30 MG: 10 INJECTION INTRAVENOUS at 08:03

## 2020-03-10 NOTE — HPI
Reason for Consult: Management of Hyperglycemia     Surgical Procedure and Date: AVR 3/10/20      HPI:   Patient is a 49 y.o. female with a diagnosis of s/p mechanical AVR 2012 in [facility] COPD on home oxygen PRN 2L NC, stroke in 2011 with left sided deficits that resolved after TPA and current smoker. Pt was told her aortic valve was not working and went in for reop for redo AVR then SHADE normal functioning aortic valve, surgery was cancelled. Pt has c/o increase COOPER, leg swelling and chest pain that has processed over the past 2 years. Patient admitted for AVR. No personal history of DM. Endocrinology consulted for BG management.

## 2020-03-10 NOTE — ANESTHESIA PROCEDURE NOTES
Arterial    Diagnosis: aortic valve disease    Patient location during procedure: done in OR  Procedure start time: 3/10/2020 7:15 AM  Timeout: 3/10/2020 7:15 AM  Procedure end time: 3/10/2020 7:18 AM    Staffing  Authorizing Provider: Rasheed Key MD  Performing Provider: Sukhdev Aleman MD    Anesthesiologist was present at the time of the procedure.    Preanesthetic Checklist  Completed: patient identified, site marked, surgical consent, pre-op evaluation, timeout performed, IV checked, risks and benefits discussed, monitors and equipment checked and anesthesia consent givenArterial  Skin Prep: chlorhexidine gluconate  Local Infiltration: lidocaine  Orientation: left  Location: radial  Catheter Size: 20 G  Catheter placement by Ultrasound guidance. Heme positive aspiration all ports.  Vessel Caliber: small, patent, compressibility normal  Vascular Doppler:  not done  Needle advanced into vessel with real time Ultrasound guidance.  Guidewire confirmed in vessel.Insertion Attempts: 2  Assessment  Dressing: secured with tape and tegaderm  Patient: Tolerated well

## 2020-03-10 NOTE — ANESTHESIA PROCEDURE NOTES
Transversous Thoracis Plane Block    Patient location during procedure: OR   Block not for primary anesthetic.  Reason for block: at surgeon's request and post-op pain management   Post-op Pain Location: Bilateral Chest Pain  Start time: 3/10/2020 7:40 AM  Timeout: 3/10/2020 7:40 AM   End time: 3/10/2020 7:50 AM    Staffing  Authorizing Provider: Rasheed Key MD  Performing Provider: Sukhdev Aleman MD    Preanesthetic Checklist  Completed: patient identified, site marked, surgical consent, pre-op evaluation, timeout performed, IV checked, risks and benefits discussed and monitors and equipment checked  Peripheral Block  Patient position: supine  Prep: ChloraPrep  Patient monitoring: heart rate, cardiac monitor, continuous pulse ox, continuous capnometry and frequent blood pressure checks  Block type: Transversus thoracis  Laterality: bilateral  Injection technique: single shot  Needle  Needle type: Stimuplex   Needle gauge: 22 G  Needle length: 2 in  Needle localization: anatomical landmarks  Catheter type: non-stimulating     Assessment  Injection assessment: negative aspiration and local visualized surrounding nerve  Heart rate change: no  Slow fractionated injection: yes

## 2020-03-10 NOTE — PROGRESS NOTES
ADMIT SKIN NOTE:     Noted bruising to right abdomen,  1x1.5 cm non-blanchable ecchymosis noted to left buttock, and bruising to left thigh. No skin breakdown to sacrum, elbows, nor heels. Foam dressings to sacrum and heels. Waffle mattress overlay inflated and mattress surface appropriate for pt's weight.

## 2020-03-10 NOTE — BRIEF OP NOTE
[facility]  Cardiothoracic Surgery  Operative Note    SUMMARY     Date of Procedure: 3/10/2020     Procedure: Procedure(s) (LRB):  1)  RE-REPLACEMENT, AORTIC VALVE with a small LivaNova Perceval pericardial prosthesis  48195-51    2)  Redo sternotomy  10995    Surgeon(s) and Role:     * Jack Jara MD - Primary     * Robert Bond MD - Fellow    Assisting Surgeon: None    Pre-Operative Diagnosis: Aortic valve disease [I35.9]    Post-Operative Diagnosis: Post-Op Diagnosis Codes:     * Aortic valve disease [I35.9]    Anesthesia: General        Description of the Findings of the Procedure: Limited mobility of one of the mechanical valve leaflets.         Complications: none    Estimated Blood Loss (EBL): 100 mL           Implants:   Implant Name Type Inv. Item Serial No.  Lot No. LRB No. Used   Perceval Aortic Valve   [id number]   N/A 1       Specimens:   Specimen (12h ago, onward)    None                  Attestation:  I was present and scrubbed for the entire procedure.

## 2020-03-10 NOTE — ANESTHESIA PROCEDURE NOTES
[place] [facility] [facility]    Diagnosis: aortic valve disease  Patient location during procedure: done in OR  Procedure start time: 3/10/2020 7:25 AM  Timeout: 3/10/2020 7:25 AM  Procedure end time: 3/10/2020 7:40 AM    Staffing  Authorizing Provider: Rasheed Key MD  Performing Provider: Sukhdev Aleman MD    Anesthesiologist was present at the time of the procedure.  Preanesthetic Checklist  Completed: patient identified, site marked, surgical consent, pre-op evaluation, timeout performed, IV checked, risks and benefits discussed, monitors and equipment checked and anesthesia consent given  [place] Line  Skin Prep: chlorhexidine gluconate  Local Infiltration: none  Location: right,  internal jugular vein  Vessel Caliber: medium, patent, compressibility normal  Vascular Doppler:  not done  Introducer: 9 Fr single lumen, manometry used.  Device: CCO/Oximetric Catheter  Catheter Size: 8 Fr  Catheter placement by yes. Heme positive aspiration all ports. PAC floated with balloon up until wedgedSterile sheath usedInsertion Attempts: 1  Indication: intravenous therapy, hemodynamic monitoring  Ultrasound Guidance  Needle advanced into vessel with real time Ultrasound guidance.  Image recorded and saved.  Guidewire confirmed in vessel.  Sterile sheath used.  Assessment  Central Line Bundle Protocol followed. Hand hygiene before procedure, surgical cap worn, surgical mask worn, sterile surgical gloves worn, large sterile drape used.  Verification: blood return  Dressing: secured with tape and tegaderm  Patient: Tolerated Well

## 2020-03-10 NOTE — TRANSFER OF CARE
"Anesthesia Transfer of Care Note    Patient: Michelle Hodges    Procedure(s) Performed: Procedure(s) (LRB):  REPLACEMENT, AORTIC VALVE, WITH REPEAT STERNOTOMY (N/A)    Patient location: ICU    Anesthesia Type: general    Transport from OR: Transported from OR on 6-10 L/min O2 by face mask with adequate spontaneous ventilation    Post pain: adequate analgesia    Post assessment: no apparent anesthetic complications    Post vital signs: stable    Level of consciousness: awake    Nausea/Vomiting: no nausea/vomiting    Complications: none    Transfer of care protocol was followed      Last vitals:   Visit Vitals  BP (!) 90/53   Pulse 89   Temp 36.6 °C (97.8 °F) (Oral)   Resp 18   Ht 5' 3" (1.6 m)   Wt 72.6 kg (160 lb)   SpO2 99%   Breastfeeding? No   BMI 28.34 kg/m²     "

## 2020-03-10 NOTE — ASSESSMENT & PLAN NOTE
CTS protocol   BG goal 110-140       Continue IV insulin infusion protocol  Requires intensive BG monitoring while on protocol

## 2020-03-10 NOTE — CONSULTS
[facility]  Endocrinology  Diabetes Consult Note    Consult Requested by: Jack Jara MD   Reason for admit: S/P AVR (aortic valve replacement)    HISTORY OF PRESENT ILLNESS:  Reason for Consult: Management of Hyperglycemia     Surgical Procedure and Date: AVR 3/10/20      HPI:   Patient is a 49 y.o. female with a diagnosis of s/p mechanical AVR 2012 in [place], , COPD on home oxygen PRN 2L NC, stroke in 2011 with left sided deficits that resolved after TPA and current smoker. Pt was told her aortic valve was not working and went in for reop for redo AVR then SHADE normal functioning aortic valve, surgery was cancelled. Pt has c/o increase COOPER, leg swelling and chest pain that has processed over the past 2 years. Patient admitted for AVR. No personal history of DM. Endocrinology consulted for BG management.     Interval HPI:   Received in ICU. Extubated. BG at goal without insulin. Face mask. Groaning in pain; recently given pain medication.   Eating:   NPO  Nausea: No  Hypoglycemia and intervention: No  Fever: No  TPN and/or TF: No    PMH, PSH, FH, SH reviewed       Review of Systems  Unable to obtain due to: Sedation,Intubation,Altered mental status,Critical illness,Reviewed ROS from note dated 3/10/20 per Dr. Bond.     Current Medications and/or Treatments Impacting Glycemic Control  Immunotherapy:    Immunosuppressants     None        Steroids:   Hormones (From admission, onward)    Start     Stop Route Frequency Ordered    03/10/20 1545  vasopressin (PITRESSIN) 0.2 Units/mL in dextrose 5 % 100 mL infusion      -- IV Continuous 03/10/20 1436        Pressors:    Autonomic Drugs (From admission, onward)    Start     Stop Route Frequency Ordered    03/10/20 1545  EPINEPHrine (ADRENALIN) 5 mg in sodium chloride 0.9% 250 mL infusion     Question Answer Comment   Titrate by: (in mcg/kg/min) 0.01    Titrate interval: (in minutes) 5    Titrate to maintain: (SBP or MAP or Cardiac Index)  SBP    Maximum dose: (in mcg/kg/min) 2        -- IV Continuous 03/10/20 1436    03/10/20 1545  norepinephrine 4 mg in dextrose 5% 250 mL infusion (premix) (titrating)     Question Answer Comment   Titrate by: (in mcg/kg/min) 0.05    Titrate interval: (in minutes) 5    Titrate to maintain: (MAP or SBP) MAP    Greater than: (in mmHg) 65    Maximum dose: (in mcg/kg/min) 3        -- IV Continuous 03/10/20 1436        Hyperglycemia/Diabetes Medications:   Antihyperglycemics (From admission, onward)    Start     Stop Route Frequency Ordered    03/10/20 1545  insulin regular 100 Units in sodium chloride 0.9% 100 mL infusion     Question:  Insulin rate changes (DO NOT MODIFY ANSWER)  Answer:  [email or web address] [id number]    -- IV Continuous 03/10/20 1436             PHYSICAL EXAMINATION:  Vitals:    03/10/20 1630   BP:    Pulse: 91   Resp: (!) 23   Temp:      Body mass index is 28.34 kg/m².    Physical Exam   Constitutional: Well developed, well nourished, NAD.  ENT: External ears no masses with nose patent; normal hearing.  Neck: Supple; trachea midline.  Cardiovascular: Normal heart sounds, no LE edema. DP +2 bilaterally.  Lungs: Normal effort; lungs anterior bilaterally clear to auscultation.  Abdomen: Soft, no masses, no hernias.  MS: No clubbing or cyanosis of nails noted;unable to assess gait.  Skin: No rashes, lesions, or ulcers; no nodules. Mid-sternal incision with dressing; chest tubes.   Psychiatric: MICK  Neurological: MICK  Foot: nails in good condition, no amputations noted.          Labs Reviewed and Include   Recent Labs   Lab 03/10/20  1440   *   CALCIUM 8.2*   ALBUMIN 1.9*   PROT 3.4*      K 4.7  4.7   CO2 24      BUN 9   CREATININE 0.7   ALKPHOS 29*   ALT 11   AST 27   BILITOT 0.3     Lab Results   Component Value Date    WBC 3.99 03/10/2020    HGB 10.7 (L) 03/10/2020    HCT 27 (L) 03/10/2020    MCV 96 03/10/2020    PLT 66 (L) 03/10/2020      No results for input(s): TSH, FREET4 in the last 168 hours.  Lab Results   Component Value Date    HGBA1C 5.0 02/13/2020       Nutritional status:   Body mass index is 28.34 kg/m².  Lab Results   Component Value Date    ALBUMIN 1.9 (L) 03/10/2020    ALBUMIN 3.9 02/13/2020     No results found for: PREALBUMIN    Estimated Creatinine Clearance: 90.8 mL/min (based on SCr of 0.7 mg/dL).    Accu-Checks  Recent Labs     03/10/20  1345 03/10/20  1424 03/10/20  1515   POCTGLUCOSE 126* 129* 121*        ASSESSMENT and PLAN    * S/P AVR (aortic valve replacement)  Optimize BG for surgical wound healing.         Acute hyperglycemia  CTS protocol   BG goal 110-140       Continue IV insulin infusion protocol  Requires intensive BG monitoring while on protocol             Plan discussed with RN at bedside.     Shameka Thorpe NP  Endocrinology  [facility]

## 2020-03-10 NOTE — PROGRESS NOTES
1445: Pt transferred from OR to SICU [id number] with anesthesia team on portable monitor and O2 via simple face mask. RENE Bond MD;  BONIFACIO Tompkins MD; ZAHRA Salvador MD at bedside upon pt's arrival. EKG and ABG results reviewed with MD. Pt hypotensive upon arrival with MAP 50s and SBP 70s. Order received for 500 cc 5% albumin and to restart epi gtt at 0.06 mcg/kg/min to maintain MAP 60-80 and to wean gtt as tolerated to maintain MAP goals.     1500: Updated Dr. Dorman; pt's CO/CO,  SvO2 35%,  current PA pressures, and current labs. Pt experiencing frequent pain; MD will enter PRN pain meds.

## 2020-03-10 NOTE — SUBJECTIVE & OBJECTIVE
Follow-up For: Procedure(s) (LRB):  REPLACEMENT, AORTIC VALVE, WITH REPEAT STERNOTOMY (N/A)    Post-Operative Day: Day of Surgery     Past Medical History:   Diagnosis Date    Anticoagulant long-term use     COPD (chronic obstructive pulmonary disease)     Rheumatoid arthritis     Stroke        History reviewed. No pertinent surgical history.    Review of patient's allergies indicates:   Allergen Reactions    Penicillins Swelling    Methotrexate        Family History     None        Tobacco Use    Smoking status: Current Every Day Smoker     Types: Cigarettes    Smokeless tobacco: Never Used   Substance and Sexual Activity    Alcohol use: Not Currently    Drug use: Not on file    Sexual activity: Not on file      Review of Systems   Unable to perform ROS: Acuity of condition     Objective:     Vital Signs (Most Recent):  Temp: 97.3 °F (36.3 °C) (03/10/20 1450)  Pulse: 72 (03/10/20 1450)  Resp: 18 (03/10/20 0540)  BP: (!) 90/53 (03/10/20 0545)  SpO2: 99 % (03/10/20 0540) Vital Signs (24h Range):  Temp:  [97.3 °F (36.3 °C)-97.8 °F (36.6 °C)] 97.3 °F (36.3 °C)  Pulse:  [72-89] 72  Resp:  [18] 18  SpO2:  [99 %] 99 %  BP: (89-90)/(53) 90/53     Weight: 72.6 kg (160 lb)  Body mass index is 28.34 kg/m².      Intake/Output Summary (Last 24 hours) at 3/10/2020 1500  Last data filed at 3/10/2020 1435  Gross per 24 hour   Intake 4307 ml   Output 1420 ml   Net 2887 ml       Physical Exam   Constitutional: She appears well-developed and well-nourished.   HENT:   Head: Normocephalic.   Nose: Nose normal.   Mouth/Throat: Oropharynx is clear and moist.   Eyes: Pupils are equal, round, and reactive to light. Conjunctivae are normal.   Neck: Normal range of motion.   Right swan in place   Cardiovascular: Normal rate.   Midline sternotomy dressing c/d/i  Post op bra in place  CT x 4 with s/s output, to suction  Pacer wires   Pulmonary/Chest: Effort normal and breath sounds normal.   5L non-rebreather   Abdominal: Soft.  Bowel sounds are normal.   Genitourinary:   Genitourinary Comments: Jiménez in place   Musculoskeletal:   Right groin dressing c/d/i   Neurological:   Sedated   Skin: Skin is warm and dry. Capillary refill takes less than 2 seconds.       Vents: Supplemental O2       Lines/Drains/Airways     Central Venous Catheter Line            Pulmonary Artery Catheter Assessment  03/10/20 0725 less than 1 day          Drain                 Urethral Catheter 03/10/20 0745 Non-latex;Straight-tip 16 Fr. less than 1 day         Y Chest Tube 1 and 2 03/10/20 1401 1 Anterior Mediastinal 19 Fr. 2 Anterior Mediastinal 19 Fr. less than 1 day         Y Chest Tube 3 and 4 03/10/20 1403 3 Right Pleural 19 Fr. 4 Left Pleural 19 Fr. less than 1 day          Arterial Line                 Arterial Line 03/10/20 0715 Left Radial less than 1 day          Line                 Pacer Wires 03/10/20 1401 less than 1 day          Peripheral Intravenous Line                 Peripheral IV - Single Lumen 03/10/20 0605 20 G Left Hand less than 1 day                Significant Labs:    CBC/Anemia Profile:  Recent Labs   Lab 03/10/20  1238 03/10/20  1242 03/10/20  1440 03/10/20  1442   WBC 8.43  --  3.99  --    HGB 8.6*  --  10.7*  --    HCT 27.3* 26* 34.5* 28*   PLT 70*  --  66*  --    MCV 92  --  96  --    RDW 15.2*  --  15.7*  --         Chemistries:  No results for input(s): NA, K, CL, CO2, BUN, CREATININE, CALCIUM, ALBUMIN, PROT, BILITOT, ALKPHOS, ALT, AST, GLUCOSE, MG, PHOS in the last 48 hours.    ABGs:   Recent Labs   Lab 03/10/20  1442   PH 7.266*   PCO2 55.9*   HCO3 25.4   POCSATURATED 97   BE -2     Lactic Acid: No results for input(s): LACTATE in the last 48 hours.    Significant Imaging: I have reviewed all pertinent imaging results/findings within the past 24 hours.

## 2020-03-10 NOTE — ASSESSMENT & PLAN NOTE
Michelle Hodges is a 49 y.o. female presents to SICU s/p RE-REPLACEMENT, AORTIC VALVE with a small LivaNova Perceval pericardial prosthesis. On 5L O2, 0.06 epi, 0.04 norepi.     Neuro:  Sedation: None  Pain control: Tylenol, Oxy, fentanyl    Resp:  Extubated  5L non-rebreather, wean as tolerated, on home O2  ABG Q1 x4 then Q4hrs.   Daily CXR    CV:  On epi 0.06  Levo 0.04  500 Albumin  MAP 60-80    Heme/ID:  H/H, trend  Periop Ancef     Renal:  Jiménez in place  Strict I/Os  BUN Cr  No lasix x 48 hrs    FEN/GI:  NPO, pending swallow study  Replace lytes PRN    Endo:  Insulin drip    PPX:  Protonix  Sub Q Hep    Dispo: Continue ICU care

## 2020-03-10 NOTE — ANESTHESIA PROCEDURE NOTES
SHADE    Diagnosis: prosthetic valve stenosis  Patient location during procedure: OR  Procedure start time: 3/10/2020 7:45 AM  Timeout: 3/10/2020 7:45 AM  Procedure end time: 3/10/2020 8:30 AM  Exam type: Baseline  Staffing  Anesthesiologist: Rasheed Key MD  Performed: anesthesiologist   Preanesthetic Checklist  Completed: patient identified, surgical consent, pre-op evaluation, timeout performed, risks and benefits discussed, monitors and equipment checked, anesthesia consent given, oxygen available, suction available, hand hygiene performed and patient being monitored  Setup & Induction  Patient preparation: bite block inserted  Probe Insertion: easy  Exam: complete      Findings  Impression  Other Findings  Normal biventricular systolic function, EF >60%  Mechanical valve in aortic position, mean gradient 33mHg, peak velocity 3.7cm/s  Difficult to visualize both leaflets   Probe Removal      Exam         LVAD  Estimated Ejection Fraction: > 55% normal  Regional Wall Abnormalities: no RWMA        Left Ventricle Diastolic Function    Lateral E': 10 cm/s    Right Heart  Right Ventricle: normal  Right Ventricle Function: normal    Intra Atrial Septum  PFO: no shunt by color flow doppler          Right Ventricle  Size: normal, Free Wall Thickness: normal    Aortic Valve:  Stenosis: moderate  Morphology: prosthetic valve  Regurgitation: one washing jet visualized     Mitral Valve:  Morphology:normal  Jet Description: none    Tricuspid Valve:  Morphology: normal  Regurgitation: none    Pulmonic Valve:  Morphology:normal  Regurgitation(color flow): none    Great Vessels  Ascending Aorta Atherosclerosis: 1=nl-min dz  Aortic Arch Atherosclerosis: 2=mild dz (<3mm)  Descending Aorta Atherosclerosis: 3=sessile dz (3-5mm)      Effusions    Summary  Findings discussed with surgeon.    Other Findings   Normal biventricular systolic function, EF >60%  Mechanical valve in aortic position, mean gradient 33mHg, peak velocity  3.7cm/s  Difficult to visualize both leaflets

## 2020-03-10 NOTE — ANESTHESIA PROCEDURE NOTES
Airway Management  Performed by: Tahir Olmedo MD  Authorized by: Rasheed Key MD     Intubation:     Induction:  Intravenous    Intubated:  Postinduction    Mask Ventilation:  Easy with oral airway    Attempts:  1    Attempted By:  Resident anesthesiologist    Method of Intubation:  Direct    Blade:  Alvarez 2    Laryngeal View Grade: Grade I - full view of chords      Difficult Airway Encountered?: No      Complications:  None    Airway Device:  Oral endotracheal tube    Airway Device Size:  7.0    Style/Cuff Inflation:  Cuffed (inflated to minimal occlusive pressure)    Inflation Amount (mL):  3    Tube secured:  22    Secured at:  The lips    Placement Verified By:  Capnometry    Complicating Factors:  None    Findings Post-Intubation:  BS equal bilateral

## 2020-03-10 NOTE — HPI
Michelle Hodges is a 49 y.o. female who presents with s/p mechanical AVR 2012 in [facility], COPD on home oxygen PRN 2L NC, stroke in 2011 with left sided deficits that resolved after TPA and current smoker. Pt was told her aortic valve was not working and went in for reop for redo AVR then SHADE normal functioning aortic valve, surgery was cancelled. Pt has c/o increase COOPER, leg swelling and chest pain that has processed over the past 2 years. 19 mm Medtronic mechanical prosthesis.  Previous fluoro study indicated 1 of the leaflets was stuck.  She has struggled significantly with anticoagulation, and desires a tissue valve.  Pt arrives to SICU s/p RE-REPLACEMENT, AORTIC VALVE with a small LivaNova Perceval pericardial prosthesis. Cross clamp time 90 min, CPB time 120 min. Pt arrives extubated on 5L non-rebreather, immediately restarted on 0.06 epi and 0.04 norepi. Pt has teagan GARDNER. Received 2u pRBC, 2u autologous, 350 cell-saver, 3500 crystalloid. Post op echo showed no abnormalities.

## 2020-03-10 NOTE — H&P
[facility]  Critical Care - Surgery  History & Physical    Patient Name: Michelle Hodges  MRN: [id number]  Admission Date: 3/10/2020  Code Status: Full Code  Attending Physician: Jack Jara MD   Primary Care Provider: Prem Giron Jr, MD   Principal Problem: <principal problem not specified>    Subjective:     HPI:  Michelle Hodges is a 49 y.o. female who presents with s/p mechanical AVR 2012 in [facility], COPD on home oxygen PRN 2L NC, stroke in 2011 with left sided deficits that resolved after TPA and current smoker. Pt was told her aortic valve was not working and went in for reop for redo AVR then SHADE normal functioning aortic valve, surgery was cancelled. Pt has c/o increase COOPER, leg swelling and chest pain that has processed over the past 2 years. 19 mm Medtronic mechanical prosthesis.  Previous fluoro study indicated 1 of the leaflets was stuck.  She has struggled significantly with anticoagulation, and desires a tissue valve.   Pt arrives to SICU s/p RE-REPLACEMENT, AORTIC VALVE with a small LivaNova Perceval pericardial prosthesis. Cross clamp time 90 min, CPB time 120 min. Pt arrives extubated on 5L non-rebreather, immediately restarted on 0.06 epi and 0.04 norepi. Pt has swan RIHENRIETTA. Received 2u pRBC, 2u autologous, 350 cell-saver, 3500 crystalloid. Post op echo showed no abnormalities.     Hospital/ICU Course:  No notes on file    Follow-up For: Procedure(s) (LRB):  REPLACEMENT, AORTIC VALVE, WITH REPEAT STERNOTOMY (N/A)    Post-Operative Day: Day of Surgery     Past Medical History:   Diagnosis Date    Anticoagulant long-term use     COPD (chronic obstructive pulmonary disease)     Rheumatoid arthritis     Stroke        History reviewed. No pertinent surgical history.    Review of patient's allergies indicates:   Allergen Reactions    Penicillins Swelling    Methotrexate        Family History     None        Tobacco Use    Smoking status: Current Every Day Smoker      Types: Cigarettes    Smokeless tobacco: Never Used   Substance and Sexual Activity    Alcohol use: Not Currently    Drug use: Not on file    Sexual activity: Not on file      Review of Systems   Unable to perform ROS: Acuity of condition     Objective:     Vital Signs (Most Recent):  Temp: 97.3 °F (36.3 °C) (03/10/20 1450)  Pulse: 72 (03/10/20 1450)  Resp: 18 (03/10/20 0540)  BP: (!) 90/53 (03/10/20 0545)  SpO2: 99 % (03/10/20 0540) Vital Signs (24h Range):  Temp:  [97.3 °F (36.3 °C)-97.8 °F (36.6 °C)] 97.3 °F (36.3 °C)  Pulse:  [72-89] 72  Resp:  [18] 18  SpO2:  [99 %] 99 %  BP: (89-90)/(53) 90/53     Weight: 72.6 kg (160 lb)  Body mass index is 28.34 kg/m².      Intake/Output Summary (Last 24 hours) at 3/10/2020 1500  Last data filed at 3/10/2020 1435  Gross per 24 hour   Intake 4307 ml   Output 1420 ml   Net 2887 ml       Physical Exam   Constitutional: She appears well-developed and well-nourished.   HENT:   Head: Normocephalic.   Nose: Nose normal.   Mouth/Throat: Oropharynx is clear and moist.   Eyes: Pupils are equal, round, and reactive to light. Conjunctivae are normal.   Neck: Normal range of motion.   Right swan in place   Cardiovascular: Normal rate.   Midline sternotomy dressing c/d/i  Post op bra in place  CT x 4 with s/s output, to suction  Pacer wires   Pulmonary/Chest: Effort normal and breath sounds normal.   5L non-rebreather   Abdominal: Soft. Bowel sounds are normal.   Genitourinary:   Genitourinary Comments: Jiménez in place   Musculoskeletal:   Right groin dressing c/d/i   Neurological:   Sedated   Skin: Skin is warm and dry. Capillary refill takes less than 2 seconds.       Vents: Supplemental O2       Lines/Drains/Airways     Central Venous Catheter Line            Pulmonary Artery Catheter Assessment  03/10/20 0732 less than 1 day          Drain                 Urethral Catheter 03/10/20 0745 Non-latex;Straight-tip 16 Fr. less than 1 day         Y Chest Tube 1 and 2 03/10/20 1401 1  Anterior Mediastinal 19 Fr. 2 Anterior Mediastinal 19 Fr. less than 1 day         Y Chest Tube 3 and 4 03/10/20 1403 3 Right Pleural 19 Fr. 4 Left Pleural 19 Fr. less than 1 day          Arterial Line                 Arterial Line 03/10/20 0715 Left Radial less than 1 day          Line                 Pacer Wires 03/10/20 1401 less than 1 day          Peripheral Intravenous Line                 Peripheral IV - Single Lumen 03/10/20 0605 20 G Left Hand less than 1 day                Significant Labs:    CBC/Anemia Profile:  Recent Labs   Lab 03/10/20  1238 03/10/20  1242 03/10/20  1440 03/10/20  1442   WBC 8.43  --  3.99  --    HGB 8.6*  --  10.7*  --    HCT 27.3* 26* 34.5* 28*   PLT 70*  --  66*  --    MCV 92  --  96  --    RDW 15.2*  --  15.7*  --         Chemistries:  No results for input(s): NA, K, CL, CO2, BUN, CREATININE, CALCIUM, ALBUMIN, PROT, BILITOT, ALKPHOS, ALT, AST, GLUCOSE, MG, PHOS in the last 48 hours.    ABGs:   Recent Labs   Lab 03/10/20  1442   PH 7.266*   PCO2 55.9*   HCO3 25.4   POCSATURATED 97   BE -2     Lactic Acid: No results for input(s): LACTATE in the last 48 hours.    Significant Imaging: I have reviewed all pertinent imaging results/findings within the past 24 hours.    Assessment/Plan:     S/P AVR (aortic valve replacement)  Michelle Hodges is a 49 y.o. female presents to SICU s/p RE-REPLACEMENT, AORTIC VALVE with a small LivaNova Perceval pericardial prosthesis. On 5L O2, 0.06 epi, 0.04 norepi.     Neuro:  Sedation: None  Pain control: Tylenol, Oxy, fentanyl    Resp:  Extubated  5L non-rebreather, wean as tolerated, on home O2  ABG Q1 x4 then Q4hrs.   Daily CXR    CV:  On epi 0.06  Levo 0.04  500 Albumin  MAP 60-80    Heme/ID:  H/H, trend  Periop Ancef     Renal:  Jiménez in place  Strict I/Os  BUN Cr  No lasix x 48 hrs    FEN/GI:  NPO, pending swallow study  Replace lytes PRN    Endo:  Insulin drip    PPX:  Protonix  Sub Q Hep    Dispo: Continue ICU care            Critical care was  time spent personally by me on the following activities: development of treatment plan with patient or surrogate and bedside caregivers, discussions with consultants, evaluation of patient's response to treatment, examination of patient, ordering and performing treatments and interventions, ordering and review of laboratory studies, ordering and review of radiographic studies, pulse oximetry, re-evaluation of patient's condition.  This critical care time did not overlap with that of any other provider or involve time for any procedures.     Ej Dorman MD  Critical Care - Surgery  [facility]

## 2020-03-10 NOTE — INTERVAL H&P NOTE
The patient has been examined and the H&P has been reviewed:    I concur with the findings and no changes have occurred since H&P was written. Will undergo redo AVR this morning.    Anesthesia/Surgery risks, benefits and alternative options discussed and understood by patient/family.          Active Hospital Problems    Diagnosis  POA    S/P AVR (aortic valve replacement) [Z95.2]  Not Applicable      Resolved Hospital Problems   No resolved problems to display.

## 2020-03-10 NOTE — SUBJECTIVE & OBJECTIVE
Interval HPI:   Received in ICU. Extubated. BG at goal without insulin. Face mask. Groaning in pain; recently given pain medication.   Eating:   NPO  Nausea: No  Hypoglycemia and intervention: No  Fever: No  TPN and/or TF: No    PMH, PSH, FH, SH reviewed       Review of Systems  Unable to obtain due to: Sedation,Intubation,Altered mental status,Critical illness,Reviewed ROS from note dated 3/10/20 per Dr. Bond.     Current Medications and/or Treatments Impacting Glycemic Control  Immunotherapy:    Immunosuppressants     None        Steroids:   Hormones (From admission, onward)    Start     Stop Route Frequency Ordered    03/10/20 1545  vasopressin (PITRESSIN) 0.2 Units/mL in dextrose 5 % 100 mL infusion      -- IV Continuous 03/10/20 1436        Pressors:    Autonomic Drugs (From admission, onward)    Start     Stop Route Frequency Ordered    03/10/20 1545  EPINEPHrine (ADRENALIN) 5 mg in sodium chloride 0.9% 250 mL infusion     Question Answer Comment   Titrate by: (in mcg/kg/min) 0.01    Titrate interval: (in minutes) 5    Titrate to maintain: (SBP or MAP or Cardiac Index) SBP    Maximum dose: (in mcg/kg/min) 2        -- IV Continuous 03/10/20 1436    03/10/20 1545  norepinephrine 4 mg in dextrose 5% 250 mL infusion (premix) (titrating)     Question Answer Comment   Titrate by: (in mcg/kg/min) 0.05    Titrate interval: (in minutes) 5    Titrate to maintain: (MAP or SBP) MAP    Greater than: (in mmHg) 65    Maximum dose: (in mcg/kg/min) 3        -- IV Continuous 03/10/20 1436        Hyperglycemia/Diabetes Medications:   Antihyperglycemics (From admission, onward)    Start     Stop Route Frequency Ordered    03/10/20 1545  insulin regular 100 Units in sodium chloride 0.9% 100 mL infusion     Question:  Insulin rate changes (DO NOT MODIFY ANSWER)  Answer:  \\[email or web address] [email or web address] [id number]    -- IV Continuous 03/10/20 1436             PHYSICAL EXAMINATION:  Vitals:     03/10/20 1630   BP:    Pulse: 91   Resp: (!) 23   Temp:      Body mass index is 28.34 kg/m².    Physical Exam   Constitutional: Well developed, well nourished, NAD.  ENT: External ears no masses with nose patent; normal hearing.  Neck: Supple; trachea midline.  Cardiovascular: Normal heart sounds, no LE edema. DP +2 bilaterally.  Lungs: Normal effort; lungs anterior bilaterally clear to auscultation.  Abdomen: Soft, no masses, no hernias.  MS: No clubbing or cyanosis of nails noted;unable to assess gait.  Skin: No rashes, lesions, or ulcers; no nodules. Mid-sternal incision with dressing; chest tubes.   Psychiatric: MICK  Neurological: MICK  Foot: nails in good condition, no amputations noted.

## 2020-03-11 ENCOUNTER — RESEARCH ENCOUNTER (OUTPATIENT)
Dept: RESEARCH | Facility: HOSPITAL | Age: 50
End: 2020-03-11

## 2020-03-11 LAB
ALBUMIN SERPL BCP-MCNC: 3.2 G/DL (ref 3.5–5.2)
ALLENS TEST: ABNORMAL
ALLENS TEST: NORMAL
ALP SERPL-CCNC: 28 U/L (ref 55–135)
ALT SERPL W/O P-5'-P-CCNC: 12 U/L (ref 10–44)
ANION GAP SERPL CALC-SCNC: 6 MMOL/L (ref 8–16)
ANION GAP SERPL CALC-SCNC: 7 MMOL/L (ref 8–16)
ANION GAP SERPL CALC-SCNC: 7 MMOL/L (ref 8–16)
ANION GAP SERPL CALC-SCNC: 8 MMOL/L (ref 8–16)
ANISOCYTOSIS BLD QL SMEAR: SLIGHT
ANISOCYTOSIS BLD QL SMEAR: SLIGHT
APTT BLDCRRT: 28.3 SEC (ref 21–32)
APTT BLDCRRT: 32.9 SEC (ref 21–32)
APTT BLDCRRT: 33 SEC (ref 21–32)
APTT BLDCRRT: 33.7 SEC (ref 21–32)
APTT BLDCRRT: 34.4 SEC (ref 21–32)
ASCENDING AORTA: 2.32 CM
AST SERPL-CCNC: 37 U/L (ref 10–40)
AV INDEX (PROSTH): 0.56
AV MEAN GRADIENT: 18 MMHG
AV PEAK GRADIENT: 26 MMHG
AV VALVE AREA: 1.69 CM2
AV VELOCITY RATIO: 0.48
BASO STIPL BLD QL SMEAR: ABNORMAL
BASO STIPL BLD QL SMEAR: ABNORMAL
BASOPHILS # BLD AUTO: 0 K/UL (ref 0–0.2)
BASOPHILS # BLD AUTO: 0.01 K/UL (ref 0–0.2)
BASOPHILS NFR BLD: 0 % (ref 0–1.9)
BASOPHILS NFR BLD: 0.1 % (ref 0–1.9)
BASOPHILS NFR BLD: 0.2 % (ref 0–1.9)
BASOPHILS NFR BLD: 0.2 % (ref 0–1.9)
BILIRUB DIRECT SERPL-MCNC: 0.2 MG/DL (ref 0.1–0.3)
BILIRUB SERPL-MCNC: 0.2 MG/DL (ref 0.1–1)
BLD PROD TYP BPU: NORMAL
BLOOD UNIT EXPIRATION DATE: NORMAL
BLOOD UNIT TYPE CODE: 600
BLOOD UNIT TYPE CODE: 6200
BLOOD UNIT TYPE: NORMAL
BSA FOR ECHO PROCEDURE: 1.78 M2
BUN SERPL-MCNC: 11 MG/DL (ref 6–20)
BUN SERPL-MCNC: 12 MG/DL (ref 6–20)
CALCIUM SERPL-MCNC: 7.7 MG/DL (ref 8.7–10.5)
CALCIUM SERPL-MCNC: 7.8 MG/DL (ref 8.7–10.5)
CHLORIDE SERPL-SCNC: 104 MMOL/L (ref 95–110)
CHLORIDE SERPL-SCNC: 105 MMOL/L (ref 95–110)
CHLORIDE SERPL-SCNC: 106 MMOL/L (ref 95–110)
CHLORIDE SERPL-SCNC: 107 MMOL/L (ref 95–110)
CO2 SERPL-SCNC: 21 MMOL/L (ref 23–29)
CO2 SERPL-SCNC: 22 MMOL/L (ref 23–29)
CO2 SERPL-SCNC: 23 MMOL/L (ref 23–29)
CO2 SERPL-SCNC: 24 MMOL/L (ref 23–29)
CODING SYSTEM: NORMAL
CREAT SERPL-MCNC: 0.7 MG/DL (ref 0.5–1.4)
CREAT SERPL-MCNC: 0.7 MG/DL (ref 0.5–1.4)
CREAT SERPL-MCNC: 0.8 MG/DL (ref 0.5–1.4)
CREAT SERPL-MCNC: 0.8 MG/DL (ref 0.5–1.4)
CV ECHO LV RWT: 0.48 CM
DELSYS: ABNORMAL
DELSYS: NORMAL
DIFFERENTIAL METHOD: ABNORMAL
DISPENSE STATUS: NORMAL
DOP CALC AO PEAK VEL: 2.53 M/S
DOP CALC AO VTI: 41.18 CM
DOP CALC LVOT AREA: 3 CM2
DOP CALC LVOT DIAMETER: 1.96 CM
DOP CALC LVOT PEAK VEL: 1.22 M/S
DOP CALC LVOT STROKE VOLUME: 69.66 CM3
DOP CALCLVOT PEAK VEL VTI: 23.1 CM
E WAVE DECELERATION TIME: 200.9 MSEC
E/A RATIO: 1.54
E/E' RATIO: 14.82 M/S
ECHO LV POSTERIOR WALL: 0.91 CM (ref 0.6–1.1)
EOSINOPHIL # BLD AUTO: 0 K/UL (ref 0–0.5)
EOSINOPHIL # BLD AUTO: 0.1 K/UL (ref 0–0.5)
EOSINOPHIL NFR BLD: 0.4 % (ref 0–8)
EOSINOPHIL NFR BLD: 0.5 % (ref 0–8)
EOSINOPHIL NFR BLD: 0.7 % (ref 0–8)
EOSINOPHIL NFR BLD: 2 % (ref 0–8)
ERYTHROCYTE [DISTWIDTH] IN BLOOD BY AUTOMATED COUNT: 15.8 % (ref 11.5–14.5)
ERYTHROCYTE [DISTWIDTH] IN BLOOD BY AUTOMATED COUNT: 15.9 % (ref 11.5–14.5)
ERYTHROCYTE [DISTWIDTH] IN BLOOD BY AUTOMATED COUNT: 16.2 % (ref 11.5–14.5)
ERYTHROCYTE [DISTWIDTH] IN BLOOD BY AUTOMATED COUNT: 16.2 % (ref 11.5–14.5)
ERYTHROCYTE [SEDIMENTATION RATE] IN BLOOD BY WESTERGREN METHOD: 24 MM/H
EST. GFR  (AFRICAN AMERICAN): >60 ML/MIN/1.73 M^2
EST. GFR  (NON AFRICAN AMERICAN): >60 ML/MIN/1.73 M^2
FIBRINOGEN PPP-MCNC: 281 MG/DL (ref 182–366)
FIBRINOGEN PPP-MCNC: 282 MG/DL (ref 182–366)
FIBRINOGEN PPP-MCNC: 328 MG/DL (ref 182–366)
FIO2: 32
FIO2: 40
FLOW: 3
FLOW: 4
FLOW: 5
FLOW: 6
FRACTIONAL SHORTENING: 14 % (ref 28–44)
GLUCOSE SERPL-MCNC: 120 MG/DL (ref 70–110)
GLUCOSE SERPL-MCNC: 129 MG/DL (ref 70–110)
GLUCOSE SERPL-MCNC: 135 MG/DL (ref 70–110)
GLUCOSE SERPL-MCNC: 148 MG/DL (ref 70–110)
GLUCOSE SERPL-MCNC: 152 MG/DL (ref 70–110)
GLUCOSE SERPL-MCNC: 167 MG/DL (ref 70–110)
GLUCOSE SERPL-MCNC: 99 MG/DL (ref 70–110)
HCO3 UR-SCNC: 17.1 MMOL/L (ref 24–28)
HCO3 UR-SCNC: 18 MMOL/L (ref 24–28)
HCO3 UR-SCNC: 18 MMOL/L (ref 24–28)
HCO3 UR-SCNC: 18.2 MMOL/L (ref 24–28)
HCO3 UR-SCNC: 19.4 MMOL/L (ref 24–28)
HCO3 UR-SCNC: 19.7 MMOL/L (ref 24–28)
HCO3 UR-SCNC: 21.6 MMOL/L (ref 24–28)
HCO3 UR-SCNC: 22 MMOL/L (ref 24–28)
HCO3 UR-SCNC: 22.1 MMOL/L (ref 24–28)
HCO3 UR-SCNC: 22.6 MMOL/L (ref 24–28)
HCO3 UR-SCNC: 24.2 MMOL/L (ref 24–28)
HCO3 UR-SCNC: 24.9 MMOL/L (ref 24–28)
HCO3 UR-SCNC: 25.2 MMOL/L (ref 24–28)
HCO3 UR-SCNC: 26.5 MMOL/L (ref 24–28)
HCO3 UR-SCNC: 9.6 MMOL/L (ref 24–28)
HCT VFR BLD AUTO: 22.6 % (ref 37–48.5)
HCT VFR BLD AUTO: 22.9 % (ref 37–48.5)
HCT VFR BLD AUTO: 25.3 % (ref 37–48.5)
HCT VFR BLD AUTO: 26.4 % (ref 37–48.5)
HCT VFR BLD CALC: 20 %PCV (ref 36–54)
HCT VFR BLD CALC: 21 %PCV (ref 36–54)
HCT VFR BLD CALC: 22 %PCV (ref 36–54)
HCT VFR BLD CALC: 25 %PCV (ref 36–54)
HCT VFR BLD CALC: 25 %PCV (ref 36–54)
HCT VFR BLD CALC: 29 %PCV (ref 36–54)
HCT VFR BLD CALC: <15 %PCV (ref 36–54)
HGB BLD-MCNC: 7.2 G/DL (ref 12–16)
HGB BLD-MCNC: 7.5 G/DL (ref 12–16)
HGB BLD-MCNC: 8 G/DL (ref 12–16)
HGB BLD-MCNC: 8.4 G/DL (ref 12–16)
HYPOCHROMIA BLD QL SMEAR: ABNORMAL
IMM GRANULOCYTES # BLD AUTO: 0.02 K/UL (ref 0–0.04)
IMM GRANULOCYTES # BLD AUTO: 0.03 K/UL (ref 0–0.04)
IMM GRANULOCYTES # BLD AUTO: 0.04 K/UL (ref 0–0.04)
IMM GRANULOCYTES # BLD AUTO: 0.04 K/UL (ref 0–0.04)
IMM GRANULOCYTES NFR BLD AUTO: 0.3 % (ref 0–0.5)
IMM GRANULOCYTES NFR BLD AUTO: 0.4 % (ref 0–0.5)
IMM GRANULOCYTES NFR BLD AUTO: 0.5 % (ref 0–0.5)
IMM GRANULOCYTES NFR BLD AUTO: 0.8 % (ref 0–0.5)
INR PPP: 0.9 (ref 0.8–1.2)
INR PPP: 1.1 (ref 0.8–1.2)
INR PPP: 1.1 (ref 0.8–1.2)
INR PPP: 1.2 (ref 0.8–1.2)
INTERVENTRICULAR SEPTUM: 1.07 CM (ref 0.6–1.1)
LA MAJOR: 3.97 CM
LA MINOR: 4.68 CM
LA WIDTH: 3.44 CM
LDH SERPL L TO P-CCNC: 0.73 MMOL/L (ref 0.36–1.25)
LDH SERPL L TO P-CCNC: 0.81 MMOL/L (ref 0.36–1.25)
LDH SERPL L TO P-CCNC: 1.01 MMOL/L (ref 0.36–1.25)
LDH SERPL L TO P-CCNC: 1.32 MMOL/L (ref 0.36–1.25)
LDH SERPL L TO P-CCNC: 1.45 MMOL/L (ref 0.36–1.25)
LDH SERPL L TO P-CCNC: 1.63 MMOL/L (ref 0.36–1.25)
LDH SERPL L TO P-CCNC: 1.89 MMOL/L (ref 0.36–1.25)
LEFT ATRIUM SIZE: 3.69 CM
LEFT ATRIUM VOLUME INDEX: 26.7 ML/M2
LEFT ATRIUM VOLUME: 46.35 CM3
LEFT INTERNAL DIMENSION IN SYSTOLE: 3.25 CM (ref 2.1–4)
LEFT VENTRICLE DIASTOLIC VOLUME INDEX: 35.4 ML/M2
LEFT VENTRICLE DIASTOLIC VOLUME: 61.54 ML
LEFT VENTRICLE MASS INDEX: 66 G/M2
LEFT VENTRICLE SYSTOLIC VOLUME INDEX: 24.4 ML/M2
LEFT VENTRICLE SYSTOLIC VOLUME: 42.4 ML
LEFT VENTRICULAR INTERNAL DIMENSION IN DIASTOLE: 3.79 CM (ref 3.5–6)
LEFT VENTRICULAR MASS: 115.13 G
LV LATERAL E/E' RATIO: 14.82 M/S
LV SEPTAL E/E' RATIO: 14.82 M/S
LYMPHOCYTES # BLD AUTO: 0.3 K/UL (ref 1–4.8)
LYMPHOCYTES # BLD AUTO: 0.3 K/UL (ref 1–4.8)
LYMPHOCYTES # BLD AUTO: 0.5 K/UL (ref 1–4.8)
LYMPHOCYTES # BLD AUTO: 0.7 K/UL (ref 1–4.8)
LYMPHOCYTES NFR BLD: 14.2 % (ref 18–48)
LYMPHOCYTES NFR BLD: 3.9 % (ref 18–48)
LYMPHOCYTES NFR BLD: 4.5 % (ref 18–48)
LYMPHOCYTES NFR BLD: 7.3 % (ref 18–48)
MAGNESIUM SERPL-MCNC: 1.7 MG/DL (ref 1.6–2.6)
MAGNESIUM SERPL-MCNC: 1.8 MG/DL (ref 1.6–2.6)
MAGNESIUM SERPL-MCNC: 2 MG/DL (ref 1.6–2.6)
MAGNESIUM SERPL-MCNC: 2.4 MG/DL (ref 1.6–2.6)
MCH RBC QN AUTO: 29.7 PG (ref 27–31)
MCH RBC QN AUTO: 29.8 PG (ref 27–31)
MCH RBC QN AUTO: 30 PG (ref 27–31)
MCH RBC QN AUTO: 31 PG (ref 27–31)
MCHC RBC AUTO-ENTMCNC: 31.4 G/DL (ref 32–36)
MCHC RBC AUTO-ENTMCNC: 31.6 G/DL (ref 32–36)
MCHC RBC AUTO-ENTMCNC: 31.8 G/DL (ref 32–36)
MCHC RBC AUTO-ENTMCNC: 33.2 G/DL (ref 32–36)
MCV RBC AUTO: 93 FL (ref 82–98)
MCV RBC AUTO: 94 FL (ref 82–98)
MCV RBC AUTO: 94 FL (ref 82–98)
MCV RBC AUTO: 95 FL (ref 82–98)
MODE: ABNORMAL
MODE: NORMAL
MONOCYTES # BLD AUTO: 0.4 K/UL (ref 0.3–1)
MONOCYTES # BLD AUTO: 0.4 K/UL (ref 0.3–1)
MONOCYTES # BLD AUTO: 0.6 K/UL (ref 0.3–1)
MONOCYTES # BLD AUTO: 0.8 K/UL (ref 0.3–1)
MONOCYTES NFR BLD: 10.1 % (ref 4–15)
MONOCYTES NFR BLD: 11.4 % (ref 4–15)
MONOCYTES NFR BLD: 6.3 % (ref 4–15)
MONOCYTES NFR BLD: 8.8 % (ref 4–15)
MV PEAK A VEL: 1.06 M/S
MV PEAK E VEL: 1.63 M/S
NEUTROPHILS # BLD AUTO: 3.7 K/UL (ref 1.8–7.7)
NEUTROPHILS # BLD AUTO: 4.9 K/UL (ref 1.8–7.7)
NEUTROPHILS # BLD AUTO: 5.9 K/UL (ref 1.8–7.7)
NEUTROPHILS # BLD AUTO: 6 K/UL (ref 1.8–7.7)
NEUTROPHILS NFR BLD: 74 % (ref 38–73)
NEUTROPHILS NFR BLD: 80.3 % (ref 38–73)
NEUTROPHILS NFR BLD: 84.2 % (ref 38–73)
NEUTROPHILS NFR BLD: 88.9 % (ref 38–73)
NRBC BLD-RTO: 0 /100 WBC
NUM UNITS TRANS FFP: NORMAL
OVALOCYTES BLD QL SMEAR: ABNORMAL
OVALOCYTES BLD QL SMEAR: ABNORMAL
PCO2 BLDA: 19.8 MMHG (ref 35–45)
PCO2 BLDA: 31.1 MMHG (ref 35–45)
PCO2 BLDA: 31.3 MMHG (ref 35–45)
PCO2 BLDA: 31.5 MMHG (ref 35–45)
PCO2 BLDA: 34.1 MMHG (ref 35–45)
PCO2 BLDA: 34.4 MMHG (ref 35–45)
PCO2 BLDA: 34.5 MMHG (ref 35–45)
PCO2 BLDA: 35.5 MMHG (ref 35–45)
PCO2 BLDA: 35.8 MMHG (ref 35–45)
PCO2 BLDA: 37.2 MMHG (ref 35–45)
PCO2 BLDA: 38 MMHG (ref 35–45)
PCO2 BLDA: 42.9 MMHG (ref 35–45)
PCO2 BLDA: 44.1 MMHG (ref 35–45)
PCO2 BLDA: 44.6 MMHG (ref 35–45)
PCO2 BLDA: 46.1 MMHG (ref 35–45)
PH SMN: 7.29 [PH] (ref 7.35–7.45)
PH SMN: 7.3 [PH] (ref 7.35–7.45)
PH SMN: 7.33 [PH] (ref 7.35–7.45)
PH SMN: 7.33 [PH] (ref 7.35–7.45)
PH SMN: 7.34 [PH] (ref 7.35–7.45)
PH SMN: 7.35 [PH] (ref 7.35–7.45)
PH SMN: 7.35 [PH] (ref 7.35–7.45)
PH SMN: 7.36 [PH] (ref 7.35–7.45)
PH SMN: 7.37 [PH] (ref 7.35–7.45)
PH SMN: 7.38 [PH] (ref 7.35–7.45)
PH SMN: 7.4 [PH] (ref 7.35–7.45)
PH SMN: 7.41 [PH] (ref 7.35–7.45)
PH SMN: 7.48 [PH] (ref 7.35–7.45)
PHOSPHATE SERPL-MCNC: 2.4 MG/DL (ref 2.7–4.5)
PHOSPHATE SERPL-MCNC: 2.5 MG/DL (ref 2.7–4.5)
PHOSPHATE SERPL-MCNC: 2.6 MG/DL (ref 2.7–4.5)
PHOSPHATE SERPL-MCNC: 3.3 MG/DL (ref 2.7–4.5)
PISA TR MAX VEL: 2.89 M/S
PLATELET # BLD AUTO: 29 K/UL (ref 150–350)
PLATELET # BLD AUTO: 32 K/UL (ref 150–350)
PLATELET # BLD AUTO: 42 K/UL (ref 150–350)
PLATELET # BLD AUTO: 46 K/UL (ref 150–350)
PLATELET BLD QL SMEAR: ABNORMAL
PLATELET BLD QL SMEAR: ABNORMAL
PMV BLD AUTO: 10.2 FL (ref 9.2–12.9)
PMV BLD AUTO: 10.9 FL (ref 9.2–12.9)
PMV BLD AUTO: 11 FL (ref 9.2–12.9)
PMV BLD AUTO: 11.5 FL (ref 9.2–12.9)
PO2 BLDA: 19 MMHG (ref 40–60)
PO2 BLDA: 28 MMHG (ref 40–60)
PO2 BLDA: 331 MMHG (ref 80–100)
PO2 BLDA: 369 MMHG (ref 80–100)
PO2 BLDA: 39 MMHG (ref 40–60)
PO2 BLDA: 51 MMHG (ref 80–100)
PO2 BLDA: 53 MMHG (ref 80–100)
PO2 BLDA: 58 MMHG (ref 80–100)
PO2 BLDA: 59 MMHG (ref 80–100)
PO2 BLDA: 61 MMHG (ref 80–100)
PO2 BLDA: 69 MMHG (ref 80–100)
PO2 BLDA: 70 MMHG (ref 80–100)
PO2 BLDA: 70 MMHG (ref 80–100)
PO2 BLDA: 73 MMHG (ref 80–100)
PO2 BLDA: 85 MMHG (ref 80–100)
POC BE: -1 MMOL/L
POC BE: -1 MMOL/L
POC BE: -17 MMOL/L
POC BE: -3 MMOL/L
POC BE: -5 MMOL/L
POC BE: -6 MMOL/L
POC BE: -7 MMOL/L
POC BE: -7 MMOL/L
POC BE: -8 MMOL/L
POC BE: -9 MMOL/L
POC BE: 0 MMOL/L
POC BE: 3 MMOL/L
POC IONIZED CALCIUM: 0.73 MMOL/L (ref 1.06–1.42)
POC IONIZED CALCIUM: 1.02 MMOL/L (ref 1.06–1.42)
POC IONIZED CALCIUM: 1.06 MMOL/L (ref 1.06–1.42)
POC IONIZED CALCIUM: 1.06 MMOL/L (ref 1.06–1.42)
POC IONIZED CALCIUM: 1.07 MMOL/L (ref 1.06–1.42)
POC IONIZED CALCIUM: 1.07 MMOL/L (ref 1.06–1.42)
POC IONIZED CALCIUM: 1.09 MMOL/L (ref 1.06–1.42)
POC IONIZED CALCIUM: 1.11 MMOL/L (ref 1.06–1.42)
POC IONIZED CALCIUM: 1.14 MMOL/L (ref 1.06–1.42)
POC IONIZED CALCIUM: 1.17 MMOL/L (ref 1.06–1.42)
POC IONIZED CALCIUM: 1.19 MMOL/L (ref 1.06–1.42)
POC SATURATED O2: 100 % (ref 95–100)
POC SATURATED O2: 100 % (ref 95–100)
POC SATURATED O2: 28 % (ref 95–100)
POC SATURATED O2: 49 % (ref 95–100)
POC SATURATED O2: 70 % (ref 95–100)
POC SATURATED O2: 82 % (ref 95–100)
POC SATURATED O2: 85 % (ref 95–100)
POC SATURATED O2: 89 % (ref 95–100)
POC SATURATED O2: 90 % (ref 95–100)
POC SATURATED O2: 91 % (ref 95–100)
POC SATURATED O2: 92 % (ref 95–100)
POC SATURATED O2: 93 % (ref 95–100)
POC SATURATED O2: 94 % (ref 95–100)
POC SATURATED O2: 94 % (ref 95–100)
POC SATURATED O2: 96 % (ref 95–100)
POC TCO2: 10 MMOL/L (ref 23–27)
POC TCO2: 18 MMOL/L (ref 23–27)
POC TCO2: 19 MMOL/L (ref 23–27)
POC TCO2: 21 MMOL/L (ref 23–27)
POC TCO2: 21 MMOL/L (ref 23–27)
POC TCO2: 23 MMOL/L (ref 23–27)
POC TCO2: 23 MMOL/L (ref 23–27)
POC TCO2: 23 MMOL/L (ref 24–29)
POC TCO2: 24 MMOL/L (ref 23–27)
POC TCO2: 26 MMOL/L (ref 24–29)
POC TCO2: 26 MMOL/L (ref 24–29)
POC TCO2: 27 MMOL/L (ref 23–27)
POC TCO2: 28 MMOL/L (ref 23–27)
POCT GLUCOSE: 127 MG/DL (ref 70–110)
POCT GLUCOSE: 130 MG/DL (ref 70–110)
POCT GLUCOSE: 150 MG/DL (ref 70–110)
POCT GLUCOSE: 159 MG/DL (ref 70–110)
POCT GLUCOSE: 172 MG/DL (ref 70–110)
POIKILOCYTOSIS BLD QL SMEAR: SLIGHT
POIKILOCYTOSIS BLD QL SMEAR: SLIGHT
POLYCHROMASIA BLD QL SMEAR: ABNORMAL
POLYCHROMASIA BLD QL SMEAR: ABNORMAL
POTASSIUM BLD-SCNC: 3.4 MMOL/L (ref 3.5–5.1)
POTASSIUM BLD-SCNC: 3.6 MMOL/L (ref 3.5–5.1)
POTASSIUM BLD-SCNC: 3.6 MMOL/L (ref 3.5–5.1)
POTASSIUM BLD-SCNC: 3.7 MMOL/L (ref 3.5–5.1)
POTASSIUM BLD-SCNC: 3.8 MMOL/L (ref 3.5–5.1)
POTASSIUM BLD-SCNC: 4 MMOL/L (ref 3.5–5.1)
POTASSIUM BLD-SCNC: 4 MMOL/L (ref 3.5–5.1)
POTASSIUM BLD-SCNC: 4.3 MMOL/L (ref 3.5–5.1)
POTASSIUM BLD-SCNC: <2 MMOL/L (ref 3.5–5.1)
POTASSIUM SERPL-SCNC: 3.7 MMOL/L (ref 3.5–5.1)
POTASSIUM SERPL-SCNC: 3.8 MMOL/L (ref 3.5–5.1)
POTASSIUM SERPL-SCNC: 3.9 MMOL/L (ref 3.5–5.1)
POTASSIUM SERPL-SCNC: 4.3 MMOL/L (ref 3.5–5.1)
PROT SERPL-MCNC: 4.6 G/DL (ref 6–8.4)
PROTHROMBIN TIME: 11.4 SEC (ref 9–12.5)
PROTHROMBIN TIME: 11.5 SEC (ref 9–12.5)
PROTHROMBIN TIME: 11.7 SEC (ref 9–12.5)
PROTHROMBIN TIME: 9.9 SEC (ref 9–12.5)
RA MAJOR: 3.86 CM
RA PRESSURE: 15 MMHG
RA WIDTH: 3.02 CM
RBC # BLD AUTO: 2.42 M/UL (ref 4–5.4)
RBC # BLD AUTO: 2.42 M/UL (ref 4–5.4)
RBC # BLD AUTO: 2.69 M/UL (ref 4–5.4)
RBC # BLD AUTO: 2.8 M/UL (ref 4–5.4)
RIGHT VENTRICULAR END-DIASTOLIC DIMENSION: 2.71 CM
SAMPLE: ABNORMAL
SAMPLE: NORMAL
SINUS: 2.23 CM
SITE: ABNORMAL
SITE: NORMAL
SODIUM BLD-SCNC: 135 MMOL/L (ref 136–145)
SODIUM BLD-SCNC: 137 MMOL/L (ref 136–145)
SODIUM BLD-SCNC: 137 MMOL/L (ref 136–145)
SODIUM BLD-SCNC: 138 MMOL/L (ref 136–145)
SODIUM BLD-SCNC: 139 MMOL/L (ref 136–145)
SODIUM BLD-SCNC: 140 MMOL/L (ref 136–145)
SODIUM BLD-SCNC: 140 MMOL/L (ref 136–145)
SODIUM BLD-SCNC: 151 MMOL/L (ref 136–145)
SODIUM SERPL-SCNC: 133 MMOL/L (ref 136–145)
SODIUM SERPL-SCNC: 134 MMOL/L (ref 136–145)
SODIUM SERPL-SCNC: 136 MMOL/L (ref 136–145)
SODIUM SERPL-SCNC: 137 MMOL/L (ref 136–145)
SP02: 88
SP02: 91
SP02: 91
SP02: 93
SP02: 93
SP02: 94
SP02: 95
SP02: 95
STJ: 2.15 CM
TDI LATERAL: 0.11 M/S
TDI SEPTAL: 0.11 M/S
TDI: 0.11 M/S
TR MAX PG: 33 MMHG
TRANS PLATPHERESIS VOL PATIENT: NORMAL ML
TV REST PULMONARY ARTERY PRESSURE: 48 MMHG
WBC # BLD AUTO: 5.01 K/UL (ref 3.9–12.7)
WBC # BLD AUTO: 5.77 K/UL (ref 3.9–12.7)
WBC # BLD AUTO: 6.72 K/UL (ref 3.9–12.7)
WBC # BLD AUTO: 7.4 K/UL (ref 3.9–12.7)

## 2020-03-11 PROCEDURE — 99232 PR SUBSEQUENT HOSPITAL CARE,LEVL II: ICD-10-PCS | Mod: ,,, | Performed by: NURSE PRACTITIONER

## 2020-03-11 PROCEDURE — 94770 HC EXHALED C02 TEST: CPT

## 2020-03-11 PROCEDURE — 25000003 PHARM REV CODE 250: Performed by: SURGERY

## 2020-03-11 PROCEDURE — 97165 OT EVAL LOW COMPLEX 30 MIN: CPT

## 2020-03-11 PROCEDURE — 85610 PROTHROMBIN TIME: CPT | Mod: 91

## 2020-03-11 PROCEDURE — 85730 THROMBOPLASTIN TIME PARTIAL: CPT

## 2020-03-11 PROCEDURE — 85014 HEMATOCRIT: CPT

## 2020-03-11 PROCEDURE — 85730 THROMBOPLASTIN TIME PARTIAL: CPT | Mod: 91

## 2020-03-11 PROCEDURE — 93005 ELECTROCARDIOGRAM TRACING: CPT

## 2020-03-11 PROCEDURE — 99233 SBSQ HOSP IP/OBS HIGH 50: CPT | Mod: GC,,, | Performed by: SURGERY

## 2020-03-11 PROCEDURE — 99233 PR SUBSEQUENT HOSPITAL CARE,LEVL III: ICD-10-PCS | Mod: GC,,, | Performed by: SURGERY

## 2020-03-11 PROCEDURE — 63600175 PHARM REV CODE 636 W HCPCS: Performed by: STUDENT IN AN ORGANIZED HEALTH CARE EDUCATION/TRAINING PROGRAM

## 2020-03-11 PROCEDURE — 63600175 PHARM REV CODE 636 W HCPCS: Mod: JG | Performed by: STUDENT IN AN ORGANIZED HEALTH CARE EDUCATION/TRAINING PROGRAM

## 2020-03-11 PROCEDURE — 27000221 HC OXYGEN, UP TO 24 HOURS

## 2020-03-11 PROCEDURE — 82330 ASSAY OF CALCIUM: CPT

## 2020-03-11 PROCEDURE — 85384 FIBRINOGEN ACTIVITY: CPT

## 2020-03-11 PROCEDURE — P9035 PLATELET PHERES LEUKOREDUCED: HCPCS

## 2020-03-11 PROCEDURE — 85610 PROTHROMBIN TIME: CPT

## 2020-03-11 PROCEDURE — 25000003 PHARM REV CODE 250: Performed by: STUDENT IN AN ORGANIZED HEALTH CARE EDUCATION/TRAINING PROGRAM

## 2020-03-11 PROCEDURE — S4991 NICOTINE PATCH NONLEGEND: HCPCS | Performed by: STUDENT IN AN ORGANIZED HEALTH CARE EDUCATION/TRAINING PROGRAM

## 2020-03-11 PROCEDURE — 93010 EKG 12-LEAD: ICD-10-PCS | Mod: ,,, | Performed by: INTERNAL MEDICINE

## 2020-03-11 PROCEDURE — 97802 MEDICAL NUTRITION INDIV IN: CPT

## 2020-03-11 PROCEDURE — 37799 UNLISTED PX VASCULAR SURGERY: CPT

## 2020-03-11 PROCEDURE — 83735 ASSAY OF MAGNESIUM: CPT

## 2020-03-11 PROCEDURE — 97161 PT EVAL LOW COMPLEX 20 MIN: CPT

## 2020-03-11 PROCEDURE — 82803 BLOOD GASES ANY COMBINATION: CPT

## 2020-03-11 PROCEDURE — 93010 ELECTROCARDIOGRAM REPORT: CPT | Mod: ,,, | Performed by: INTERNAL MEDICINE

## 2020-03-11 PROCEDURE — C9113 INJ PANTOPRAZOLE SODIUM, VIA: HCPCS | Performed by: SURGERY

## 2020-03-11 PROCEDURE — P9045 ALBUMIN (HUMAN), 5%, 250 ML: HCPCS | Mod: JG | Performed by: STUDENT IN AN ORGANIZED HEALTH CARE EDUCATION/TRAINING PROGRAM

## 2020-03-11 PROCEDURE — 85025 COMPLETE CBC W/AUTO DIFF WBC: CPT | Mod: 91

## 2020-03-11 PROCEDURE — 63600175 PHARM REV CODE 636 W HCPCS: Performed by: SURGERY

## 2020-03-11 PROCEDURE — 63600175 PHARM REV CODE 636 W HCPCS: Mod: JG

## 2020-03-11 PROCEDURE — P9045 ALBUMIN (HUMAN), 5%, 250 ML: HCPCS | Mod: JG

## 2020-03-11 PROCEDURE — 84100 ASSAY OF PHOSPHORUS: CPT

## 2020-03-11 PROCEDURE — 99232 SBSQ HOSP IP/OBS MODERATE 35: CPT | Mod: ,,, | Performed by: NURSE PRACTITIONER

## 2020-03-11 PROCEDURE — 94799 UNLISTED PULMONARY SVC/PX: CPT

## 2020-03-11 PROCEDURE — 80048 BASIC METABOLIC PNL TOTAL CA: CPT

## 2020-03-11 PROCEDURE — 93041 RHYTHM ECG TRACING: CPT

## 2020-03-11 PROCEDURE — 84295 ASSAY OF SERUM SODIUM: CPT

## 2020-03-11 PROCEDURE — 84132 ASSAY OF SERUM POTASSIUM: CPT

## 2020-03-11 PROCEDURE — 83605 ASSAY OF LACTIC ACID: CPT

## 2020-03-11 PROCEDURE — 94761 N-INVAS EAR/PLS OXIMETRY MLT: CPT

## 2020-03-11 PROCEDURE — 80076 HEPATIC FUNCTION PANEL: CPT

## 2020-03-11 PROCEDURE — 84100 ASSAY OF PHOSPHORUS: CPT | Mod: 91

## 2020-03-11 PROCEDURE — 80048 BASIC METABOLIC PNL TOTAL CA: CPT | Mod: 91

## 2020-03-11 PROCEDURE — 99900035 HC TECH TIME PER 15 MIN (STAT)

## 2020-03-11 PROCEDURE — 83735 ASSAY OF MAGNESIUM: CPT | Mod: 91

## 2020-03-11 PROCEDURE — 20000000 HC ICU ROOM

## 2020-03-11 RX ORDER — ALBUMIN HUMAN 50 G/1000ML
12.5 SOLUTION INTRAVENOUS ONCE
Status: DISCONTINUED | OUTPATIENT
Start: 2020-03-11 | End: 2020-03-11 | Stop reason: ALTCHOICE

## 2020-03-11 RX ORDER — ALBUMIN HUMAN 50 G/1000ML
12.5 SOLUTION INTRAVENOUS ONCE
Status: DISCONTINUED | OUTPATIENT
Start: 2020-03-11 | End: 2020-03-11

## 2020-03-11 RX ORDER — DEXTROSE MONOHYDRATE, SODIUM CHLORIDE, AND POTASSIUM CHLORIDE 50; 1.49; 9 G/1000ML; G/1000ML; G/1000ML
INJECTION, SOLUTION INTRAVENOUS CONTINUOUS
Status: DISCONTINUED | OUTPATIENT
Start: 2020-03-11 | End: 2020-03-13

## 2020-03-11 RX ORDER — HYDROCODONE BITARTRATE AND ACETAMINOPHEN 500; 5 MG/1; MG/1
TABLET ORAL
Status: DISCONTINUED | OUTPATIENT
Start: 2020-03-11 | End: 2020-03-12

## 2020-03-11 RX ORDER — OXYCODONE HYDROCHLORIDE 5 MG/1
5 TABLET ORAL EVERY 4 HOURS PRN
Status: DISCONTINUED | OUTPATIENT
Start: 2020-03-11 | End: 2020-03-16 | Stop reason: HOSPADM

## 2020-03-11 RX ORDER — PANTOPRAZOLE SODIUM 40 MG/1
40 TABLET, DELAYED RELEASE ORAL DAILY
Status: DISCONTINUED | OUTPATIENT
Start: 2020-03-12 | End: 2020-03-16 | Stop reason: HOSPADM

## 2020-03-11 RX ORDER — ACETAMINOPHEN 500 MG
1000 TABLET ORAL EVERY 8 HOURS
Status: COMPLETED | OUTPATIENT
Start: 2020-03-11 | End: 2020-03-12

## 2020-03-11 RX ORDER — INDOMETHACIN 25 MG/1
50 CAPSULE ORAL ONCE
Status: COMPLETED | OUTPATIENT
Start: 2020-03-11 | End: 2020-03-11

## 2020-03-11 RX ORDER — ALBUMIN HUMAN 50 G/1000ML
SOLUTION INTRAVENOUS
Status: DISPENSED
Start: 2020-03-11 | End: 2020-03-11

## 2020-03-11 RX ORDER — NALOXONE HCL 0.4 MG/ML
0.02 VIAL (ML) INJECTION
Status: DISCONTINUED | OUTPATIENT
Start: 2020-03-11 | End: 2020-03-11

## 2020-03-11 RX ORDER — ALBUMIN HUMAN 50 G/1000ML
25 SOLUTION INTRAVENOUS ONCE
Status: COMPLETED | OUTPATIENT
Start: 2020-03-11 | End: 2020-03-11

## 2020-03-11 RX ORDER — DIPHENHYDRAMINE HCL 25 MG
25 CAPSULE ORAL EVERY 6 HOURS PRN
Status: DISCONTINUED | OUTPATIENT
Start: 2020-03-11 | End: 2020-03-16 | Stop reason: HOSPADM

## 2020-03-11 RX ORDER — ALBUMIN HUMAN 50 G/1000ML
12.5 SOLUTION INTRAVENOUS ONCE
Status: COMPLETED | OUTPATIENT
Start: 2020-03-11 | End: 2020-03-11

## 2020-03-11 RX ORDER — OXYCODONE HYDROCHLORIDE 5 MG/1
10 TABLET ORAL EVERY 4 HOURS PRN
Status: DISCONTINUED | OUTPATIENT
Start: 2020-03-11 | End: 2020-03-16 | Stop reason: HOSPADM

## 2020-03-11 RX ORDER — OXYCODONE HYDROCHLORIDE 10 MG/1
10 TABLET ORAL EVERY 6 HOURS PRN
Status: DISCONTINUED | OUTPATIENT
Start: 2020-03-11 | End: 2020-03-11

## 2020-03-11 RX ORDER — HYDROMORPHONE HCL IN 0.9% NACL 6 MG/30 ML
PATIENT CONTROLLED ANALGESIA SYRINGE INTRAVENOUS CONTINUOUS
Status: DISCONTINUED | OUTPATIENT
Start: 2020-03-11 | End: 2020-03-11

## 2020-03-11 RX ORDER — HYDROMORPHONE HYDROCHLORIDE 1 MG/ML
0.5 INJECTION, SOLUTION INTRAMUSCULAR; INTRAVENOUS; SUBCUTANEOUS
Status: DISCONTINUED | OUTPATIENT
Start: 2020-03-11 | End: 2020-03-13

## 2020-03-11 RX ORDER — OXYCODONE HYDROCHLORIDE 5 MG/1
5 TABLET ORAL EVERY 6 HOURS PRN
Status: DISCONTINUED | OUTPATIENT
Start: 2020-03-11 | End: 2020-03-11

## 2020-03-11 RX ORDER — OXYCODONE HYDROCHLORIDE 10 MG/1
10 TABLET ORAL EVERY 4 HOURS
Status: DISCONTINUED | OUTPATIENT
Start: 2020-03-11 | End: 2020-03-11

## 2020-03-11 RX ORDER — OXYCODONE HYDROCHLORIDE 5 MG/1
5 TABLET ORAL EVERY 4 HOURS
Status: DISCONTINUED | OUTPATIENT
Start: 2020-03-11 | End: 2020-03-11

## 2020-03-11 RX ADMIN — VASOPRESSIN 0.04 UNITS/MIN: 20 INJECTION INTRAVENOUS at 09:03

## 2020-03-11 RX ADMIN — ACETAMINOPHEN 1000 MG: 500 TABLET ORAL at 09:03

## 2020-03-11 RX ADMIN — OXYCODONE HYDROCHLORIDE 10 MG: 10 TABLET ORAL at 10:03

## 2020-03-11 RX ADMIN — CEFAZOLIN 2 G: 1 INJECTION, POWDER, FOR SOLUTION INTRAMUSCULAR; INTRAVENOUS at 11:03

## 2020-03-11 RX ADMIN — HYDROMORPHONE HYDROCHLORIDE 0.5 MG: 1 INJECTION, SOLUTION INTRAMUSCULAR; INTRAVENOUS; SUBCUTANEOUS at 09:03

## 2020-03-11 RX ADMIN — DIPHENHYDRAMINE HYDROCHLORIDE 25 MG: 25 CAPSULE ORAL at 12:03

## 2020-03-11 RX ADMIN — CEFAZOLIN 2 G: 1 INJECTION, POWDER, FOR SOLUTION INTRAMUSCULAR; INTRAVENOUS at 08:03

## 2020-03-11 RX ADMIN — MUPIROCIN 1 G: 20 OINTMENT TOPICAL at 10:03

## 2020-03-11 RX ADMIN — ONDANSETRON 8 MG: 4 TABLET, ORALLY DISINTEGRATING ORAL at 11:03

## 2020-03-11 RX ADMIN — OXYCODONE HYDROCHLORIDE 10 MG: 10 TABLET ORAL at 04:03

## 2020-03-11 RX ADMIN — DEXTROSE MONOHYDRATE, SODIUM CHLORIDE, AND POTASSIUM CHLORIDE: 50; 9; 1.49 INJECTION, SOLUTION INTRAVENOUS at 04:03

## 2020-03-11 RX ADMIN — ONDANSETRON 4 MG: 2 INJECTION INTRAMUSCULAR; INTRAVENOUS at 07:03

## 2020-03-11 RX ADMIN — MAGNESIUM SULFATE HEPTAHYDRATE 2 G: 40 INJECTION, SOLUTION INTRAVENOUS at 02:03

## 2020-03-11 RX ADMIN — ALBUMIN (HUMAN) 12.5 G: 12.5 SOLUTION INTRAVENOUS at 06:03

## 2020-03-11 RX ADMIN — OXYCODONE HYDROCHLORIDE 10 MG: 10 TABLET ORAL at 11:03

## 2020-03-11 RX ADMIN — POTASSIUM CHLORIDE 20 MEQ: 200 INJECTION, SOLUTION INTRAVENOUS at 07:03

## 2020-03-11 RX ADMIN — ASPIRIN 325 MG ORAL TABLET 325 MG: 325 PILL ORAL at 09:03

## 2020-03-11 RX ADMIN — OXYCODONE HYDROCHLORIDE 5 MG: 5 TABLET ORAL at 08:03

## 2020-03-11 RX ADMIN — HYDROMORPHONE HYDROCHLORIDE 0.5 MG: 1 INJECTION, SOLUTION INTRAMUSCULAR; INTRAVENOUS; SUBCUTANEOUS at 05:03

## 2020-03-11 RX ADMIN — DIPHENHYDRAMINE HYDROCHLORIDE 25 MG: 25 CAPSULE ORAL at 07:03

## 2020-03-11 RX ADMIN — CALCIUM GLUCONATE 1 G: 98 INJECTION, SOLUTION INTRAVENOUS at 10:03

## 2020-03-11 RX ADMIN — PANTOPRAZOLE SODIUM 40 MG: 40 INJECTION, POWDER, FOR SOLUTION INTRAVENOUS at 03:03

## 2020-03-11 RX ADMIN — SODIUM BICARBONATE 50 MEQ: 84 INJECTION, SOLUTION INTRAVENOUS at 09:03

## 2020-03-11 RX ADMIN — OXYCODONE HYDROCHLORIDE 10 MG: 10 TABLET ORAL at 07:03

## 2020-03-11 RX ADMIN — PROMETHAZINE HYDROCHLORIDE 12.5 MG: 25 INJECTION INTRAMUSCULAR; INTRAVENOUS at 02:03

## 2020-03-11 RX ADMIN — ATORVASTATIN CALCIUM 10 MG: 10 TABLET, FILM COATED ORAL at 08:03

## 2020-03-11 RX ADMIN — Medication: at 04:03

## 2020-03-11 RX ADMIN — OXYCODONE HYDROCHLORIDE 5 MG: 5 TABLET ORAL at 03:03

## 2020-03-11 RX ADMIN — CEFAZOLIN 2 G: 1 INJECTION, POWDER, FOR SOLUTION INTRAMUSCULAR; INTRAVENOUS at 04:03

## 2020-03-11 RX ADMIN — ACETAMINOPHEN 1000 MG: 500 TABLET ORAL at 02:03

## 2020-03-11 RX ADMIN — PROMETHAZINE HYDROCHLORIDE 12.5 MG: 25 INJECTION INTRAMUSCULAR; INTRAVENOUS at 09:03

## 2020-03-11 RX ADMIN — VASOPRESSIN 0.04 UNITS/MIN: 20 INJECTION INTRAVENOUS at 05:03

## 2020-03-11 RX ADMIN — ALBUMIN (HUMAN) 25 G: 12.5 SOLUTION INTRAVENOUS at 07:03

## 2020-03-11 RX ADMIN — POLYETHYLENE GLYCOL 3350 17 G: 17 POWDER, FOR SOLUTION ORAL at 10:03

## 2020-03-11 RX ADMIN — SODIUM PHOSPHATE, MONOBASIC, MONOHYDRATE 15 MMOL: 276; 142 INJECTION, SOLUTION INTRAVENOUS at 04:03

## 2020-03-11 RX ADMIN — MICONAZOLE NITRATE: 20 OINTMENT TOPICAL at 02:03

## 2020-03-11 RX ADMIN — MICONAZOLE NITRATE: 20 OINTMENT TOPICAL at 10:03

## 2020-03-11 RX ADMIN — ONDANSETRON 4 MG: 2 INJECTION INTRAMUSCULAR; INTRAVENOUS at 09:03

## 2020-03-11 RX ADMIN — DEXTROSE MONOHYDRATE, SODIUM CHLORIDE, AND POTASSIUM CHLORIDE: 50; 9; 1.49 INJECTION, SOLUTION INTRAVENOUS at 03:03

## 2020-03-11 RX ADMIN — HYDROMORPHONE HYDROCHLORIDE 0.5 MG: 1 INJECTION, SOLUTION INTRAMUSCULAR; INTRAVENOUS; SUBCUTANEOUS at 01:03

## 2020-03-11 RX ADMIN — NICOTINE 1 PATCH: 7 PATCH, EXTENDED RELEASE TRANSDERMAL at 10:03

## 2020-03-11 RX ADMIN — POTASSIUM CHLORIDE 20 MEQ: 200 INJECTION, SOLUTION INTRAVENOUS at 11:03

## 2020-03-11 RX ADMIN — HYDROMORPHONE HYDROCHLORIDE 0.5 MG: 1 INJECTION, SOLUTION INTRAMUSCULAR; INTRAVENOUS; SUBCUTANEOUS at 02:03

## 2020-03-11 RX ADMIN — TRAZODONE HYDROCHLORIDE 50 MG: 50 TABLET ORAL at 08:03

## 2020-03-11 RX ADMIN — CITALOPRAM HYDROBROMIDE 40 MG: 20 TABLET ORAL at 09:03

## 2020-03-11 RX ADMIN — METHOCARBAMOL 500 MG: 100 INJECTION, SOLUTION INTRAMUSCULAR; INTRAVENOUS at 08:03

## 2020-03-11 NOTE — PROGRESS NOTES
Updated Dr. Tompkins on current PA waveform and SvO2 readings; MD at bedside to assess and review CXR. No new orders received at this time.

## 2020-03-11 NOTE — PLAN OF CARE
Problem: Adult Inpatient Plan of Care  Goal: Plan of Care Review  Outcome: Ongoing, Progressing     Recommendations    1. Continue Cardiac diet as tolerated, with texture per SLP recommendations.   2. Recommend Boost Plus TID to supplement intake.   3. If TF warranted, recommend Impact Peptide 1.5 @ 45mL/hr x 24 hrs to provide 1620kcal, 102g protein, and 832mL free water. Meets 99% of EEN and 100% of EPN.   Goals: Pt to consume/tolerate % of EEN/EPN by RD follow-up.  Nutrition Goal Status: new    Full assessment completed 3/11/20.

## 2020-03-11 NOTE — PLAN OF CARE
CM at bedside to obtain discharge planning assessment.  Patient's son Yung [phone] is at bedside.  No discharge needs are noted at this time.  My health packet given, after informed of it.  Patient' son verbalized understanding; CM contact number placed on whiteboard.      Prem Giron Jr, MD  [address] [facility] / [place] [address]      [facility] [place] [address]  [address]  [place] [address]  Phone: [phone] Fax: [phone]    [facility] [place] [place] - [address] AT Portal to Registered [employer] Sites  [address]  [facility] [address]  Phone: [phone] Fax: [phone]      Payor: MEDICARE / Plan: MEDICARE PART A & B / Product Type: Government /      Extended Emergency Contact Information  Primary Emergency Contact: Julio Sosa  Mobile Phone: [phone]  Relation: Other  Preferred language: English   needed? No    No future appointments.       03/11/20 1227   Discharge Assessment   Assessment Type Discharge Planning Assessment   Confirmed/corrected address and phone number on facesheet? Yes   Assessment information obtained from? Caregiver  (Son Yung is at bedside)   Communicated expected length of stay with patient/caregiver yes   Prior to hospitilization cognitive status: Alert/Oriented   Prior to hospitalization functional status: Independent   Current cognitive status: Alert/Oriented   Current Functional Status:   (unable to assess)   Lives With spouse   Able to Return to Prior Arrangements yes   Is patient able to care for self after discharge? Unable to determine at this time (comments)   Who are your caregiver(s) and their phone number(s)? Julio (ex spouse)- [phone]/ Yung (son)- [phone]   Readmission Within the Last 30 Days no previous admission in last 30 days   Patient currently being followed by outpatient case management? No   Patient currently receives any other outside agency services?  No   Equipment Currently Used at Home oxygen;other (see comments)  (BP machine)   Do you have any problems affording any of your prescribed medications? No   Is the patient taking medications as prescribed? yes   Does the patient have transportation home? Yes   Transportation Anticipated family or friend will provide   Dialysis Name and Scheduled days N/A   Does the patient receive services at the Coumadin Clinic? No  (PCP follows coumadin)   Discharge Plan A Home Health   Discharge Plan B Home with family   DME Needed Upon Discharge  other (see comments)  (TBD)   Patient/Family in Agreement with Plan yes   Does the patient have transportation to healthcare appointments? Yes       Yamel Martin MPH, RN, CM  Ext. [phone]

## 2020-03-11 NOTE — PT/OT/SLP EVAL
"Occupational Therapy   Evaluation    Name: Michelle Hodges  MRN: [id number]  Admitting Diagnosis:  S/P AVR (aortic valve replacement) 1 Day Post-Op    Recommendations:     Discharge Recommendations: home health OT  Discharge Equipment Recommendations:  none  Barriers to discharge:  None    Assessment:     Michelle Hodges is a 49 y.o. female with a medical diagnosis of S/P AVR (aortic valve replacement).  She presents with hTN while sitting up in chair and need to return to bed. Pt anxious during session, c/o difficulty breathing. Performance deficits affecting function: weakness, impaired self care skills, impaired balance, impaired endurance, impaired functional mobilty, gait instability, decreased upper extremity function, decreased safety awareness, impaired cardiopulmonary response to activity, pain.      Rehab Prognosis: Good; patient would benefit from acute skilled OT services to address these deficits and reach maximum level of function.       Plan:     Patient to be seen 5 x/week to address the above listed problems via therapeutic exercises, self-care/home management, therapeutic activities  · Plan of Care Expires: 04/10/20  · Plan of Care Reviewed with: patient    Subjective     Chief Complaint: "I can't breathe."  Patient/Family Comments/goals: to get better and go home    Occupational Profile:  Living Environment: lives with ex- in [facility] with 1 KYRA; tub/shower combo with "high" grab bar  Previous level of function: (I) with ADL and ambulation; occasionally uses QC and O2  Roles and Routines: on disability; unable to complete housework; drives  Equipment Used at Home:  cane, quad, CPAP, grab bar, oxygen(O2 and QC used as needed; grab bar in shower )  Assistance upon Discharge: unknown per pt    Pain/Comfort:  · Pain Rating 1: (c/o generalized pain, but does not localize or rate)  · Pain Addressed 1: Reposition, Distraction, Cessation of Activity    Patients cultural, spiritual, Tenriism conflicts " given the current situation: no    Objective:     Communicated with: RN prior to session.  Patient found up in chair with arterial line, blood pressure cuff, telemetry, pulse ox (continuous), central line, chest tube, nunez catheter, oxygen([place]-pedro catheter) upon OT entry to room.    General Precautions: Standard, fall, sternal   Orthopedic Precautions:    Braces:       Occupational Performance:    Bed Mobility:    · Patient completed Sit to Supine with total assistance    Functional Mobility/Transfers:  · Patient completed Sit <> Stand Transfer with minimum assistance  with  no assistive device from b/s chair  · Patient completed Chair <> Bed Transfer using Stand Pivot technique with moderate assistance with no assistive device  · Functional Mobility: NT    Activities of Daily Living:  · Grooming: minimum assistance simulated while pt supine in bed  · Lower Body Dressing: total assistance doffing socks    Cognitive/Visual Perceptual:  Pt is alert, oriented and following commands  Pt very anxious during session    Physical Exam:  Postural examination/scapula alignment:    -       No postural abnormalities identified  Sensation:    -       Intact  Upper Extremity Range of Motion:     -       Right Upper Extremity: WFL  -       Left Upper Extremity: WFL  Upper Extremity Strength:    -       Right Upper Extremity: WFL  -       Left Upper Extremity: WFL   Strength:    -       Right Upper Extremity: WFL  -       Left Upper Extremity: WFL      AMPAC 6 Click ADL:  AMPAC Total Score: 10    Treatment & Education:  Pt ed on OT POC  Pt UIC with RN requesting pt to return to bed 2* hTN  Pt very anxious likely contributing to increased amount of assistance for transfers  Pt ed on pursed lip breathing  Pt ed on sternal precautions during ADL; handout provided    Education:    Patient left supine with all lines intact, call button in reach, RN notified and DIL present    GOALS:   Multidisciplinary Problems     Occupational  Therapy Goals        Problem: Occupational Therapy Goal    Goal Priority Disciplines Outcome Interventions   Occupational Therapy Goal     OT, PT/OT Ongoing, Progressing    Description:  Goals to be met by: 3/21/20     Patient will increase functional independence with ADLs by performing:    UE Dressing with Supervision.  LE Dressing with Stand-by Assistance.  Grooming while standing at sink with Stand-by Assistance.  Toileting from toilet with Stand-by Assistance for hygiene and clothing management.   Toilet transfer to toilet with Stand-by Assistance.                      History:     Past Medical History:   Diagnosis Date    Anticoagulant long-term use     COPD (chronic obstructive pulmonary disease)     Rheumatoid arthritis     Stroke        Past Surgical History:   Procedure Laterality Date    REPLACEMENT OF AORTIC VALVE WITH REPEAT STERNOTOMY N/A 3/10/2020    Procedure: REPLACEMENT, AORTIC VALVE, WITH REPEAT STERNOTOMY;  Surgeon: Jack Jara MD;  Location: [facility] OR [address] [place] [address];  Service: Cardiothoracic;  Laterality: N/A;  labs, UA, type and match am of surgery       Time Tracking:     OT Date of Treatment: 03/11/20  OT Start Time: 0844  OT Stop Time: 0854  OT Total Time (min): 10 min    Billable Minutes:Evaluation 10    CHERELLE Martel  3/11/2020

## 2020-03-11 NOTE — PLAN OF CARE
Problem: Physical Therapy Goal  Goal: Physical Therapy Goal  Description  Goals to be met by: 3/25/20    Patient will increase functional independence with mobility by performin. Supine to sit with Contact Guard Assistance -not met  2. Sit to stand transfer with Contact Guard Assistance -not met  3. Gait  x 150 feet with Contact Guard Assistance  -not met  4. Lower extremity exercise program x15 reps  with supervision -not met     Outcome: Ongoing, Progressing   Evaluation completed and goals appropriate. Kanchan Carter, PT  /3/11/2020

## 2020-03-11 NOTE — PROGRESS NOTES
Dr. Jara and Dr. Tompkins at bedside to assess pt. Updated on current gtts, labs, CVP, and ABG. Orders received to start vaso gtt at 0.04 units/min and wean epi gtt as tolerated to maintain MAPs 60-80.

## 2020-03-11 NOTE — ASSESSMENT & PLAN NOTE
Michelle Hodges is a 49 y.o. female presents to SICU s/p RE-REPLACEMENT, AORTIC VALVE with a small LivaNova Perceval pericardial prosthesis. On 3L NC, epi 0.06. H/H and plt down-trend today, plt running currently. Will add vaso 0.02 for low diastolic presures. HIT panel sent out. ECHO to evaluate AV.     Neuro:  Sedation: None  Pain control: Scheduled tylenol, oxy, fentanyl    Resp:  Extubated  3L NC, wean as tolerated  Hx on home O2  Monitor CT outputs, to suction  Daily CXR    CV:  Wean epi, on 0.06  Off levo  Adding vaso 0.02 to aid in low diastolic pressors  ECHO to evaluate aortic valve   MAP 60-80    Heme/ID:  H/H, 8.4 (10.7)/26.4  Periop Ancef   HIT panel send out serotonin assay  Platelets running currently, will monitor    Renal:  Jiménez in place  UOP 2595, net FB +2.917  Strict I/Os  BUN Cr, today 12/0.7      FEN/GI:  NPO  Passed swallow study  Replace lytes PRN    Endo:  Insulin drip    PPX:  Protonix  Sub Q Hep    Dispo: Continue ICU care

## 2020-03-11 NOTE — PROGRESS NOTES
03/11/2020:  Patient eligible for the SURE AVR study.  Spoke to the bedside RN, Santa Youssef, regarding currently status.  Will not approach patient today for participation in study.

## 2020-03-11 NOTE — SUBJECTIVE & OBJECTIVE
"Interval HPI:   Overnight events: Remains in ICU. NAEON. BG at or slightly above goal without insulin.   Eatin% Diet Cardiac  Nausea: No  Hypoglycemia and intervention: No  Fever: No  TPN and/or TF: No    /60 (BP Location: Right arm, Patient Position: Lying)   Pulse 90   Temp 97.7 °F (36.5 °C) (Oral)   Resp (!) 23   Ht 5' 2" (1.575 m)   Wt 72.6 kg (160 lb)   SpO2 97%   Breastfeeding? No   BMI 29.26 kg/m²     Labs Reviewed and Include    Recent Labs   Lab 20  0300  20  1500   *   < > 152*   CALCIUM 7.8*   < > 7.7*   ALBUMIN 3.2*  --   --    PROT 4.6*  --   --       < > 134*   K 3.9   < > 4.3   CO2 22*   < > 21*      < > 106   BUN 12   < > 11   CREATININE 0.7   < > 0.8   ALKPHOS 28*  --   --    ALT 12  --   --    AST 37  --   --    BILITOT 0.2  --   --     < > = values in this interval not displayed.     Lab Results   Component Value Date    WBC 7.40 2020    HGB 8.0 (L) 2020    HCT 25.3 (L) 2020    MCV 94 2020    PLT 46 (L) 2020     No results for input(s): TSH, FREET4 in the last 168 hours.  Lab Results   Component Value Date    HGBA1C 5.0 2020       Nutritional status:   Body mass index is 29.26 kg/m².  Lab Results   Component Value Date    ALBUMIN 3.2 (L) 2020    ALBUMIN 1.9 (L) 03/10/2020    ALBUMIN 3.9 2020     No results found for: PREALBUMIN    Estimated Creatinine Clearance: 77.6 mL/min (based on SCr of 0.8 mg/dL).    Accu-Checks  Recent Labs     03/10/20  1345 03/10/20  1424 03/10/20  1515 03/10/20  1626 03/10/20  1744 03/10/20  2011 03/10/20  2126 20  0230 20  0913 20  1127   POCTGLUCOSE 126* 129* 121* 101 93 145* 114* 127* 172* 159*       Current Medications and/or Treatments Impacting Glycemic Control  Immunotherapy:    Immunosuppressants     None        Steroids:   Hormones (From admission, onward)    Start     Stop Route Frequency Ordered    03/10/20 1545  vasopressin (PITRESSIN) 0.2 " Units/mL in dextrose 5 % 100 mL infusion      -- IV Continuous 03/10/20 1436        Pressors:    Autonomic Drugs (From admission, onward)    Start     Stop Route Frequency Ordered    03/10/20 1545  EPINEPHrine (ADRENALIN) 5 mg in sodium chloride 0.9% 250 mL infusion     Question Answer Comment   Titrate by: (in mcg/kg/min) 0.01    Titrate interval: (in minutes) 5    Titrate to maintain: (SBP or MAP or Cardiac Index) SBP    Maximum dose: (in mcg/kg/min) 2        -- IV Continuous 03/10/20 1436    03/10/20 1545  norepinephrine 4 mg in dextrose 5% 250 mL infusion (premix) (titrating)     Question Answer Comment   Titrate by: (in mcg/kg/min) 0.05    Titrate interval: (in minutes) 5    Titrate to maintain: (MAP or SBP) MAP    Greater than: (in mmHg) 65    Maximum dose: (in mcg/kg/min) 3        -- IV Continuous 03/10/20 1436        Hyperglycemia/Diabetes Medications:   Antihyperglycemics (From admission, onward)    Start     Stop Route Frequency Ordered    03/10/20 2250  insulin aspart U-100 pen 0-5 Units      -- SubQ Before meals & nightly PRN 03/10/20 2410

## 2020-03-11 NOTE — ASSESSMENT & PLAN NOTE
CTS protocol   BG goal 110-140     BG monitoring ac/hs and low dose correction scale.   No history of DM. If no futher insulin/endocrine needs, will sign off soon.

## 2020-03-11 NOTE — PROGRESS NOTES
Skin Rounds.     Barney:  15  Vasopressors:  Vaso, Epi  Bed: EHOB  Lines: Peace SVo2    Patient with mild moisture dermatitis to the perineal area. Patient states she easily gets yeast infections. Recommend a barrier antifungal cream BID to the perineal area.           Bruising noted to the left flank           Recommend:  Q2hour turns  Heels offloaded  Up in chair no greater than 2 hours at a time  EHOB overlay  Cushion for chair.     Wound care to follow PRN.    Valerie Arias RN CWCN CN   [phone]

## 2020-03-11 NOTE — PHYSICIAN QUERY
"PT Name: Michelle Hodges  MR #: [id number]    Physician Query Form - Hematology Clarification      CDS/: Hilda Garcia RN, CCDS              Contact information: [email or web address]    This form is a permanent document in the medical record.      Query Date: March 11, 2020    By submitting this query, we are merely seeking further clarification of documentation. Please utilize your independent clinical judgment when addressing the question(s) below.    The Medical record contains the following:   Indicators  Supporting Clinical Findings Location in Medical Record    "Anemia" documented     X H & H = 10.7/34.5-->8.4/26.4-->7.2/22.9 3/10, 3/11 lab    BP =                     HR=      "GI bleeding" documented      Acute bleeding (Non GI site)     X Transfusion(s)  Received 2u pRBC, 2u autologous, 350 cell-saver 3/10 cc h/p    Treatment:     X Other:  Re-Replacement Aortic Valve  ebl 10 ml 3/10 op note     Provider, please specify diagnosis or diagnoses associated with above clinical findings.    [ X ] Acute blood loss anemia expected post-operatively       [  ] Other Hematological Diagnosis (please specify):     [  ] Clinically Undetermined       Please document in your progress notes daily for the duration of treatment, until resolved, and include in your discharge summary.                                                                                                      "

## 2020-03-11 NOTE — SUBJECTIVE & OBJECTIVE
Interval History/Significant Events: NAEO, VSS. 2L NC with sats >90%. MAP 60-80, CVP  7-10. 1L albumin given yesterday. UOP 2595, 575 overnight. net fluid balance +2917. CT 1,2 (mediastinal) 215, CT 3,4 (pleural) 342. H/H downtrend 10.7 to 8.4, plt 66 to 32.     Follow-up For: Procedure(s) (LRB):  REPLACEMENT, AORTIC VALVE, WITH REPEAT STERNOTOMY (N/A)    Post-Operative Day: 1 Day Post-Op    Objective:     Vital Signs (Most Recent):  Temp: 98.6 °F (37 °C) (03/11/20 0800)  Pulse: 93 (03/11/20 0940)  Resp: (!) 41 (03/11/20 0940)  BP: (!) 92/53 (03/11/20 0900)  SpO2: (!) 92 % (03/11/20 0945) Vital Signs (24h Range):  Temp:  [97.3 °F (36.3 °C)-100.4 °F (38 °C)] 98.6 °F (37 °C)  Pulse:  [] 93  Resp:  [16-48] 41  SpO2:  [83 %-100 %] 92 %  BP: ()/(42-73) 92/53  Arterial Line BP: ()/(35-60) 132/46     Weight: 72.6 kg (160 lb)  Body mass index is 28.35 kg/m².      Intake/Output Summary (Last 24 hours) at 3/11/2020 0956  Last data filed at 3/11/2020 0800  Gross per 24 hour   Intake 4303.49 ml   Output 3522 ml   Net 781.49 ml       Physical Exam   Constitutional: She appears well-developed and well-nourished.   Sleepy, but responsive. Dilaudid PCA   Cardiovascular: Normal rate and intact distal pulses.   Pulmonary/Chest: Effort normal. No respiratory distress.   3L NC   Abdominal: Soft. She exhibits no distension.   Skin: Skin is warm and dry.   Nursing note and vitals reviewed.      Vents:  Oxygen Concentration (%): 28 (03/10/20 2006)    Lines/Drains/Airways     Central Venous Catheter Line             Introducer with Double Lumen 03/10/20 right internal jugular 1 day    Pulmonary Artery Catheter Assessment  03/10/20 0744 1 day          Drain                 Urethral Catheter 03/10/20 0745 Non-latex;Straight-tip 16 Fr. 1 day         Y Chest Tube 1 and 2 03/10/20 1401 1 Anterior Mediastinal 19 Fr. 2 Anterior Mediastinal 19 Fr. less than 1 day         Y Chest Tube 3 and 4 03/10/20 1403 3 Right Pleural 19 Fr. 4  Left Pleural 19 Fr. less than 1 day          Arterial Line                 Arterial Line 03/10/20 0715 Left Radial 1 day          Line                 Pacer Wires 03/10/20 1401 less than 1 day                Significant Labs:    CBC/Anemia Profile:  Recent Labs   Lab 03/10/20  1238  03/10/20  1440  03/11/20  0300 03/11/20  0326 03/11/20  0734 03/11/20  0940   WBC 8.43  --  3.99  --  6.72  --   --   --    HGB 8.6*  --  10.7*  --  8.4*  --   --   --    HCT 27.3*   < > 34.5*   < > 26.4* 21* 20* 21*   PLT 70*  --  66*  --  32*  --   --   --    MCV 92  --  96  --  94  --   --   --    RDW 15.2*  --  15.7*  --  15.8*  --   --   --     < > = values in this interval not displayed.        Chemistries:  Recent Labs   Lab 03/10/20  1440 03/11/20  0300    137   K 4.7  4.7 3.9    107   CO2 24 22*   BUN 9 12   CREATININE 0.7 0.7   CALCIUM 8.2* 7.8*   ALBUMIN 1.9* 3.2*   PROT 3.4* 4.6*   BILITOT 0.3 0.2   ALKPHOS 29* 28*   ALT 11 12   AST 27 37   MG 2.5 2.0   PHOS 4.3 3.3       Significant Imaging:  I have reviewed all pertinent imaging results/findings within the past 24 hours.

## 2020-03-11 NOTE — PROGRESS NOTES
"[facility]  Adult Nutrition  Progress Note    SUMMARY       Recommendations    1. Continue Cardiac diet as tolerated, with texture per SLP recommendations.   2. Recommend Boost Plus TID to supplement intake.   3. If TF warranted, recommend Impact Peptide 1.5 @ 45mL/hr x 24 hrs to provide 1620kcal, 102g protein, and 832mL free water. Meets 99% of EEN and 100% of EPN.   Goals: Pt to consume/tolerate % of EEN/EPN by RD follow-up.  Nutrition Goal Status: new    Reason for Assessment    Reason For Assessment: consult  Diagnosis: surgery/postoperative complications(S/p re-replacement of aortic valve)  Relevant Medical History: COPD, stroke, RA  Interdisciplinary Rounds: did not attend  General Information Comments: Pt resting in bed with family at bedside. Pt reports decreased appetite now and PTA. She states she has lost wt 2/2 decreased appetite. Per chart review, pt with 2.7% wt loss x 1.5 months (not significant). Pt reports nausea and constipation. NFPE completed 3/11, pt well-nourished, does not meet criteria for malnutrition at this time, but at risk if poor PO intake continues.  Nutrition Discharge Planning: Adequate PO intake.    Nutrition Risk Screen    Nutrition Risk Screen: no indicators present    Nutrition/Diet History    Spiritual, Cultural Beliefs, Confucianism Practices, Values that Affect Care: no    Anthropometrics    Temp: 98.9 °F (37.2 °C)  Height: 5' 2.99" (160 cm)  Height (inches): 62.99 in  Weight Method: Bed Scale  Weight: 72.6 kg (160 lb)  Weight (lb): 160 lb  Ideal Body Weight (IBW), Female: 114.95 lb  % Ideal Body Weight, Female (lb): 139.19 %  BMI (Calculated): 28.4  BMI Grade: 25 - 29.9 - overweight       Lab/Procedures/Meds    Pertinent Labs Reviewed: reviewed  Pertinent Labs Comments: CO2 22, Glu 120, Ca 7.8, Total protein 4.6  Pertinent Medications Reviewed: reviewed  Pertinent Medications Comments: IVF, hydromorphone, insulin, aspirin, nicardipine, pantoprazole, " bisacodyl, polyethylene glycol, atorvastatin      Estimated/Assessed Needs    Weight Used For Calorie Calculations: 72.6 kg (160 lb 0.9 oz)  Energy Calorie Requirements (kcal): 1638  Energy Need Method: [place]-St Jeor(x 1.25)  Protein Requirements: 87-109g/day(1.2-1.5g/kg)  Weight Used For Protein Calculations: 72.6 kg (160 lb 0.9 oz)  Fluid Requirements (mL): 1mL/kcal or per MD  Estimated Fluid Requirement Method: RDA Method  RDA Method (mL): 1638         Nutrition Prescription Ordered    Current Diet Order: Cardiac    Evaluation of Received Nutrient/Fluid Intake    I/O: +2.6L since admit  Comments: LBM 3/10  % Intake of Estimated Energy Needs: 0-25%  % Meal Intake: 0-25%    Nutrition Risk    Level of Risk/Frequency of Follow-up: low(1x/wk)     Assessment and Plan    Nutrition Problem  Inadequate energy intake    Related to (etiology):   Decreased appetite    Signs and Symptoms (as evidenced by):   Pt consuming <50% of EEN/EPN.    Interventions/Recommendations (treatment strategy):  Collaboration of Care with providers  Modified Diet - Cardiac  Commercial Beverage - Boost Plus TID  Enteral Nutrition    Nutrition Diagnosis Status:   New      Monitor and Evaluation    Food and Nutrient Intake: energy intake, food and beverage intake  Food and Nutrient Adminstration: diet order  Knowledge/Beliefs/Attitudes: food and nutrition knowledge/skill  Physical Activity and Function: nutrition-related ADLs and IADLs  Anthropometric Measurements: weight, weight change, body mass index  Biochemical Data, Medical Tests and Procedures: electrolyte and renal panel, gastrointestinal profile, glucose/endocrine profile, inflammatory profile, lipid profile  Nutrition-Focused Physical Findings: overall appearance     Malnutrition Assessment                 Orbital Region (Subcutaneous Fat Loss): well nourished  Upper Arm Region (Subcutaneous Fat Loss): well nourished   Druze Region (Muscle Loss): mild depletion  Clavicle Bone Region  (Muscle Loss): well nourished  Clavicle and Acromion Bone Region (Muscle Loss): well nourished  Dorsal Hand (Muscle Loss): well nourished  Patellar Region (Muscle Loss): well nourished  Anterior Thigh Region (Muscle Loss): well nourished  Posterior Calf Region (Muscle Loss): well nourished   Edema (Fluid Accumulation): 1-->trace             Nutrition Follow-Up    RD Follow-up?: Yes

## 2020-03-11 NOTE — PROGRESS NOTES
"      SICU PLAN OF CARE NOTE    Dx: S/P AVR (aortic valve replacement)    Shift Events: Pt admitted to SICU after AVR. On 2L NC with O2 sats >90%. MAP goals 60-80. CVP 7-10 flat. 1L albumin administered upon arrival to unit. Pt sat at edge with assist x2 of bed this afternoon and tolerated well. Family at bedside and updated on plan of care.    Goals of Care: Pain control with PRN meds.     Neuro: AAO x4, Follows Commands and Moves All Extremities    Vital Signs: BP (!) 91/51   Pulse 86   Temp 99.1 °F (37.3 °C) (Core ([place]))   Resp (!) 31   Ht 5' 3" (1.6 m)   Wt 72.6 kg (160 lb)   SpO2 (!) 93%   Breastfeeding? No   BMI 28.34 kg/m²     Respiratory: Nasal Cannula    Diet: NPO and Sips with Meds    Gtts: Epinephrine and MIVF    Urine Output: Urinary Catheter 575 cc/shift    Drains: Chest Tube, total output 335 cc /  shift    Labs/Accuchecks: Accuchecks Q1H.    Skin: Generalized bruising buttocks. Bruising to right abdomen and left thigh. Midsternal incision with telfa island dressing remains clean, dry, intact. No breakdown to elbows, sacrum, nor heels. Foam dressings to pressure points in place. Waffle overlay in place and inflated and mattress surface appropriate for pt's weight.        "

## 2020-03-11 NOTE — PLAN OF CARE
Plan of Care Note  Cardiothoracic Surgery    Michelle Hodges is a 49 y.o. female with aortic stenosis after mechanical AVR in 2012 underwent tissue AVR (Perceval) (3/10/20)    Specific issues: metabolic acidosis, thrombocytopenia, decreased UOP; platelet transfusion, increase epi and attach continuous CO monitor; adv diet    Plan of care for patient was discussed with ICU staff including nurses, residents, and faculty and appropriate consulting services.    Will continue to monitor patient's hemodynamics, functionality, neuro status, fluid status and renal function, and labs and will adjust medications and fluids as necessary while monitoring appropriateness for de-escalation of support and monitoring and transport to stepdown unit.    Robert Bond MD  Cardiothoracic Surgery Fellow

## 2020-03-11 NOTE — PROGRESS NOTES
"[facility]  Endocrinology  Progress Note    Admit Date: 3/10/2020     Reason for Consult: Management of Hyperglycemia     Surgical Procedure and Date: AVR 3/10/20      HPI:   Patient is a 49 y.o. female with a diagnosis of s/p mechanical AVR  in [place], , COPD on home oxygen PRN 2L NC, stroke in  with left sided deficits that resolved after TPA and current smoker. Pt was told her aortic valve was not working and went in for reop for redo AVR then SHADE normal functioning aortic valve, surgery was cancelled. Pt has c/o increase COOPER, leg swelling and chest pain that has processed over the past 2 years. Patient admitted for AVR. No personal history of DM. Endocrinology consulted for BG management.     Interval HPI:   Overnight events: Remains in ICU. NAEON. BG at or slightly above goal without insulin.   Eatin% Diet Cardiac  Nausea: No  Hypoglycemia and intervention: No  Fever: No  TPN and/or TF: No    /60 (BP Location: Right arm, Patient Position: Lying)   Pulse 90   Temp 97.7 °F (36.5 °C) (Oral)   Resp (!) 23   Ht 5' 2" (1.575 m)   Wt 72.6 kg (160 lb)   SpO2 97%   Breastfeeding? No   BMI 29.26 kg/m²      Labs Reviewed and Include    Recent Labs   Lab 20  0300  20  1500   *   < > 152*   CALCIUM 7.8*   < > 7.7*   ALBUMIN 3.2*  --   --    PROT 4.6*  --   --       < > 134*   K 3.9   < > 4.3   CO2 22*   < > 21*      < > 106   BUN 12   < > 11   CREATININE 0.7   < > 0.8   ALKPHOS 28*  --   --    ALT 12  --   --    AST 37  --   --    BILITOT 0.2  --   --     < > = values in this interval not displayed.     Lab Results   Component Value Date    WBC 7.40 2020    HGB 8.0 (L) 2020    HCT 25.3 (L) 2020    MCV 94 2020    PLT 46 (L) 2020     No results for input(s): TSH, FREET4 in the last 168 hours.  Lab Results   Component Value Date    HGBA1C 5.0 2020       Nutritional status:   Body mass index is 29.26 " kg/m².  Lab Results   Component Value Date    ALBUMIN 3.2 (L) 03/11/2020    ALBUMIN 1.9 (L) 03/10/2020    ALBUMIN 3.9 02/13/2020     No results found for: PREALBUMIN    Estimated Creatinine Clearance: 77.6 mL/min (based on SCr of 0.8 mg/dL).    Accu-Checks  Recent Labs     03/10/20  1345 03/10/20  1424 03/10/20  1515 03/10/20  1626 03/10/20  1744 03/10/20  2011 03/10/20  2126 03/11/20  0230 03/11/20  0913 03/11/20  1127   POCTGLUCOSE 126* 129* 121* 101 93 145* 114* 127* 172* 159*       Current Medications and/or Treatments Impacting Glycemic Control  Immunotherapy:    Immunosuppressants     None        Steroids:   Hormones (From admission, onward)    Start     Stop Route Frequency Ordered    03/10/20 1545  vasopressin (PITRESSIN) 0.2 Units/mL in dextrose 5 % 100 mL infusion      -- IV Continuous 03/10/20 1436        Pressors:    Autonomic Drugs (From admission, onward)    Start     Stop Route Frequency Ordered    03/10/20 1545  EPINEPHrine (ADRENALIN) 5 mg in sodium chloride 0.9% 250 mL infusion     Question Answer Comment   Titrate by: (in mcg/kg/min) 0.01    Titrate interval: (in minutes) 5    Titrate to maintain: (SBP or MAP or Cardiac Index) SBP    Maximum dose: (in mcg/kg/min) 2        -- IV Continuous 03/10/20 1436    03/10/20 1545  norepinephrine 4 mg in dextrose 5% 250 mL infusion (premix) (titrating)     Question Answer Comment   Titrate by: (in mcg/kg/min) 0.05    Titrate interval: (in minutes) 5    Titrate to maintain: (MAP or SBP) MAP    Greater than: (in mmHg) 65    Maximum dose: (in mcg/kg/min) 3        -- IV Continuous 03/10/20 1436        Hyperglycemia/Diabetes Medications:   Antihyperglycemics (From admission, onward)    Start     Stop Route Frequency Ordered    03/10/20 2250  insulin aspart U-100 pen 0-5 Units      -- SubQ Before meals & nightly PRN 03/10/20 2150          ASSESSMENT and PLAN    * S/P AVR (aortic valve replacement)  Optimize BG for surgical wound healing.         Acute  hyperglycemia  CTS protocol   BG goal 110-140     BG monitoring ac/hs and low dose correction scale.   No history of DM. If no futher insulin/endocrine needs, will sign off soon.               Shameka Thorpe NP  Endocrinology  [facility]

## 2020-03-11 NOTE — PLAN OF CARE
SW attempted to meet with patient and family to review discharge plan of HH and provide a list of in-network providers; however, SW unable to do so due to patient being attended to by other members of the treatment team.    SW will continue to coordinate with patient, family, team and insurance to complete patient's discharge plan.    Samia Abdi LMSW   - Case Management

## 2020-03-11 NOTE — PLAN OF CARE
Problem: Adult Inpatient Plan of Care  Goal: Plan of Care Review  Outcome: Ongoing, Progressing  No acute events throughout the night. VSS. Pt remains on 3L NC satting 92-95%. Pain not well controlled, PCA ordered and now in place. Gtts: Epinephrine currently at 0.02mcg/kg/min, MIVF at 10 cc, and Dilaudid PCA. UOP excellent overnight. Chest tube output serosanguinous in color. See flowsheets for assessments and intake and output. Pt oob to chair this and became hypotensive, Dr. Tompkins notified and 250cc 5% albumin ordered. Plan to continue to wean pressors. Will continue to monitor.

## 2020-03-11 NOTE — PT/OT/SLP EVAL
Physical Therapy Evaluation    Patient Name:  Michelle Hodges   MRN:  [id number]    Recommendations:     Discharge Recommendations:  home health PT   Discharge Equipment Recommendations: (will determine DME needs closer to discharge)   Barriers to discharge: None    Assessment:     Michelle Hodges is a 49 y.o. female admitted with a medical diagnosis of S/P AVR (aortic valve replacement).  She presents with the following impairments/functional limitations:  impaired endurance, gait instability, impaired functional mobilty, decreased lower extremity function .pt chelsea treatment fair but SOB and decreased BP  limited functional mobility. Pt will benefit from cont skilled PT 5x/wk to progress physically. Pt should be able to discharge home with HHPT and DME determined closer to discharge. Pt is s/p AVR 3/10/20.    Rehab Prognosis: Good; patient would benefit from acute skilled PT services to address these deficits and reach maximum level of function.    Recent Surgery: Procedure(s) (LRB):  REPLACEMENT, AORTIC VALVE, WITH REPEAT STERNOTOMY (N/A) 1 Day Post-Op    Plan:     During this hospitalization, patient to be seen 5 x/week to address the identified rehab impairments via gait training, therapeutic activities, therapeutic exercises and progress toward the following goals:    · Plan of Care Expires:  04/09/20    Subjective     Chief Complaint: pt c/o decreased BP and pain during treatment.   Patient/Family Comments/goals:  To get better and go home.   Pain/Comfort:  · Pain Rating 1: 8/10(chest)  · Pain Rating Post-Intervention 1: 8/10    Patients cultural, spiritual, Taoism conflicts given the current situation: no    Living Environment:  Pt is medically disabled and lives with her ex-. They live in [address] [place] [address].   Prior to admission, patients level of function was Independent .  Equipment used at home: oxygen, CPAP, cane, quad, grab bar.  DME owned (not currently used): none.  Upon discharge, patient will  have assistance from ex- may or may not assist. .    Objective:     Communicated with nurse prior to session.  Patient found up in chair with telemetry, pulse ox (continuous), blood pressure cuff, chest tube, nunez catheter, central line, oxygen, PICC line, PCA(CVP, swan -pedro catheter)  upon PT entry to room.    General Precautions: Standard, fall, sternal   Orthopedic Precautions:    Braces:       Exams:  · Cognitive Exam:  Patient is oriented to Person, Place, Time and Situation  · RLE ROM: WFL  · RLE Strength: WFL  · LLE ROM: WFL  · LLE Strength: WFL    Functional Mobility:  · Bed Mobility:   Pt needed verbal cues for functional mobility with sternal precautions.   · Rolling Right: maximal assistance  · Sit to Supine: maximal assistance  ·   · Transfers:     · Sit to Stand:  minimum assistance with hand-held assist  ·   Chair to Bed : moderate assistance with  hand-held assist  using  Stand Pivot. Pt had decreased BP with therapy entering room. RN asked to put pt back to bed.       Therapeutic Activities and Exercises:   pt and daughter in law received verbal instructions in role of PT and POC. Both verbally expressed understanding of such.     AM-PAC 6 CLICK MOBILITY  Total Score:12     Patient left supine with all lines intact, call button in reach and daughter in law present.    GOALS:   Multidisciplinary Problems     Physical Therapy Goals        Problem: Physical Therapy Goal    Goal Priority Disciplines Outcome Goal Variances Interventions   Physical Therapy Goal     PT, PT/OT Ongoing, Progressing     Description:  Goals to be met by: 3/25/20    Patient will increase functional independence with mobility by performin. Supine to sit with Contact Guard Assistance -not met  2. Sit to stand transfer with Contact Guard Assistance -not met  3. Gait  x 150 feet with Contact Guard Assistance  -not met  4. Lower extremity exercise program x15 reps  with supervision -not met                       History:     Past Medical History:   Diagnosis Date    Anticoagulant long-term use     COPD (chronic obstructive pulmonary disease)     Rheumatoid arthritis     Stroke        Past Surgical History:   Procedure Laterality Date    REPLACEMENT OF AORTIC VALVE WITH REPEAT STERNOTOMY N/A 3/10/2020    Procedure: REPLACEMENT, AORTIC VALVE, WITH REPEAT STERNOTOMY;  Surgeon: Jack Jara MD;  Location: [facility] OR [address] [place] [address];  Service: Cardiothoracic;  Laterality: N/A;  labs, UA, type and match am of surgery       Time Tracking:     PT Received On: 03/11/20  PT Start Time: 0844     PT Stop Time: 0854  PT Total Time (min): 10 min     Billable Minutes: Evaluation 10 min      Kanchan Carter, PT  03/11/2020

## 2020-03-11 NOTE — PROGRESS NOTES
[facility]  Critical Care - Surgery  Progress Note    Patient Name: Michelle Hodges  MRN: [id number]  Admission Date: 3/10/2020  Hospital Length of Stay: 1 days  Code Status: Full Code  Attending Provider: Jack Jara MD  Primary Care Provider: Prem Giron Jr, MD   Principal Problem: S/P AVR (aortic valve replacement)    Subjective:     Interval History/Significant Events: NAEO, VSS. 2L NC with sats >90%. MAP 60-80, CVP  7-10. 1L albumin given yesterday. UOP 2595, 575 overnight. net fluid balance +2917. CT 1,2 (mediastinal) 215, CT 3,4 (pleural) 342. H/H downtrend 10.7 to 8.4, plt 66 to 32.     Follow-up For: Procedure(s) (LRB):  REPLACEMENT, AORTIC VALVE, WITH REPEAT STERNOTOMY (N/A)    Post-Operative Day: 1 Day Post-Op    Objective:     Vital Signs (Most Recent):  Temp: 98.6 °F (37 °C) (03/11/20 0800)  Pulse: 93 (03/11/20 0940)  Resp: (!) 41 (03/11/20 0940)  BP: (!) 92/53 (03/11/20 0900)  SpO2: (!) 92 % (03/11/20 0945) Vital Signs (24h Range):  Temp:  [97.3 °F (36.3 °C)-100.4 °F (38 °C)] 98.6 °F (37 °C)  Pulse:  [] 93  Resp:  [16-48] 41  SpO2:  [83 %-100 %] 92 %  BP: ()/(42-73) 92/53  Arterial Line BP: ()/(35-60) 132/46     Weight: 72.6 kg (160 lb)  Body mass index is 28.35 kg/m².      Intake/Output Summary (Last 24 hours) at 3/11/2020 0956  Last data filed at 3/11/2020 0800  Gross per 24 hour   Intake 4303.49 ml   Output 3522 ml   Net 781.49 ml       Physical Exam   Constitutional: She appears well-developed and well-nourished.   Sleepy, but responsive. Dilaudid PCA   Cardiovascular: Normal rate and intact distal pulses.   Pulmonary/Chest: Effort normal. No respiratory distress.   3L NC   Abdominal: Soft. She exhibits no distension.   Skin: Skin is warm and dry.   Nursing note and vitals reviewed.      Vents:  Oxygen Concentration (%): 28 (03/10/20 2006)    Lines/Drains/Airways     Central Venous Catheter Line             Introducer with Double Lumen 03/10/20 right  internal jugular 1 day    Pulmonary Artery Catheter Assessment  03/10/20 0725 1 day          Drain                 Urethral Catheter 03/10/20 0745 Non-latex;Straight-tip 16 Fr. 1 day         Y Chest Tube 1 and 2 03/10/20 1401 1 Anterior Mediastinal 19 Fr. 2 Anterior Mediastinal 19 Fr. less than 1 day         Y Chest Tube 3 and 4 03/10/20 1403 3 Right Pleural 19 Fr. 4 Left Pleural 19 Fr. less than 1 day          Arterial Line                 Arterial Line 03/10/20 0715 Left Radial 1 day          Line                 Pacer Wires 03/10/20 1401 less than 1 day                Significant Labs:    CBC/Anemia Profile:  Recent Labs   Lab 03/10/20  1238  03/10/20  1440  03/11/20  0300 03/11/20  0326 03/11/20  0734 03/11/20  0940   WBC 8.43  --  3.99  --  6.72  --   --   --    HGB 8.6*  --  10.7*  --  8.4*  --   --   --    HCT 27.3*   < > 34.5*   < > 26.4* 21* 20* 21*   PLT 70*  --  66*  --  32*  --   --   --    MCV 92  --  96  --  94  --   --   --    RDW 15.2*  --  15.7*  --  15.8*  --   --   --     < > = values in this interval not displayed.        Chemistries:  Recent Labs   Lab 03/10/20  1440 03/11/20  0300    137   K 4.7  4.7 3.9    107   CO2 24 22*   BUN 9 12   CREATININE 0.7 0.7   CALCIUM 8.2* 7.8*   ALBUMIN 1.9* 3.2*   PROT 3.4* 4.6*   BILITOT 0.3 0.2   ALKPHOS 29* 28*   ALT 11 12   AST 27 37   MG 2.5 2.0   PHOS 4.3 3.3       Significant Imaging:  I have reviewed all pertinent imaging results/findings within the past 24 hours.    Assessment/Plan:     * S/P AVR (aortic valve replacement)  Michelle Hodges is a 49 y.o. female presents to SICU s/p RE-REPLACEMENT, AORTIC VALVE with a small LivaNova Perceval pericardial prosthesis. On 3L NC, epi 0.06. H/H and plt down-trend today, plt running currently. Will add vaso 0.02 for low diastolic presures. HIT panel sent out. ECHO to evaluate AV.     Neuro:  Sedation: None  Pain control: Scheduled tylenol, oxy, fentanyl    Resp:  Extubated  3L NC, wean as  tolerated  Hx on home O2  Monitor CT outputs, to suction  Daily CXR    CV:  Wean epi, on 0.06  Off levo  Adding vaso 0.02 to aid in low diastolic pressors  ECHO to evaluate aortic valve   MAP 60-80    Heme/ID:  H/H, 8.4 (10.7)/26.4  Periop Ancef   HIT panel send out serotonin assay  Platelets running currently, will monitor    Renal:  Jiménez in place  UOP 2595, net FB +2.917  Strict I/Os  BUN Cr, today 12/0.7      FEN/GI:  NPO  Passed swallow study  Replace lytes PRN    Endo:  Insulin drip    PPX:  Protonix  Sub Q Hep    Dispo: Continue ICU care        Critical care was time spent personally by me on the following activities: development of treatment plan with patient or surrogate and bedside caregivers, discussions with consultants, evaluation of patient's response to treatment, examination of patient, ordering and performing treatments and interventions, ordering and review of laboratory studies, ordering and review of radiographic studies, pulse oximetry, re-evaluation of patient's condition.  This critical care time did not overlap with that of any other provider or involve time for any procedures.     Dee Doe MD  Critical Care - Surgery  [facility]

## 2020-03-11 NOTE — PLAN OF CARE
Problem: Occupational Therapy Goal  Goal: Occupational Therapy Goal  Description  Goals to be met by: 3/21/20     Patient will increase functional independence with ADLs by performing:    UE Dressing with Supervision.  LE Dressing with Stand-by Assistance.  Grooming while standing at sink with Stand-by Assistance.  Toileting from toilet with Stand-by Assistance for hygiene and clothing management.   Toilet transfer to toilet with Stand-by Assistance.     Outcome: Ongoing, Progressing   The above goals remain appropriate. CHERELLE Martel  3/11/2020

## 2020-03-12 LAB
ALBUMIN SERPL BCP-MCNC: 3.5 G/DL (ref 3.5–5.2)
ALLENS TEST: ABNORMAL
ALP SERPL-CCNC: 31 U/L (ref 55–135)
ALT SERPL W/O P-5'-P-CCNC: 32 U/L (ref 10–44)
ANION GAP SERPL CALC-SCNC: 5 MMOL/L (ref 8–16)
ANISOCYTOSIS BLD QL SMEAR: SLIGHT
APTT BLDCRRT: 31.6 SEC (ref 21–32)
AST SERPL-CCNC: 54 U/L (ref 10–40)
BASOPHILS # BLD AUTO: 0.01 K/UL (ref 0–0.2)
BASOPHILS # BLD AUTO: 0.02 K/UL (ref 0–0.2)
BASOPHILS NFR BLD: 0.1 % (ref 0–1.9)
BASOPHILS NFR BLD: 0.4 % (ref 0–1.9)
BILIRUB SERPL-MCNC: 1.4 MG/DL (ref 0.1–1)
BLD PROD TYP BPU: NORMAL
BLOOD UNIT EXPIRATION DATE: NORMAL
BLOOD UNIT TYPE CODE: 5100
BLOOD UNIT TYPE: NORMAL
BUN SERPL-MCNC: 9 MG/DL (ref 6–20)
CALCIUM SERPL-MCNC: 7.7 MG/DL (ref 8.7–10.5)
CHLORIDE SERPL-SCNC: 103 MMOL/L (ref 95–110)
CO2 SERPL-SCNC: 25 MMOL/L (ref 23–29)
CODING SYSTEM: NORMAL
CREAT SERPL-MCNC: 0.7 MG/DL (ref 0.5–1.4)
DELSYS: ABNORMAL
DIFFERENTIAL METHOD: ABNORMAL
DIFFERENTIAL METHOD: ABNORMAL
DISPENSE STATUS: NORMAL
EOSINOPHIL # BLD AUTO: 0.2 K/UL (ref 0–0.5)
EOSINOPHIL # BLD AUTO: 0.3 K/UL (ref 0–0.5)
EOSINOPHIL NFR BLD: 2.9 % (ref 0–8)
EOSINOPHIL NFR BLD: 4.5 % (ref 0–8)
ERYTHROCYTE [DISTWIDTH] IN BLOOD BY AUTOMATED COUNT: 15.6 % (ref 11.5–14.5)
ERYTHROCYTE [DISTWIDTH] IN BLOOD BY AUTOMATED COUNT: 16.2 % (ref 11.5–14.5)
ERYTHROCYTE [SEDIMENTATION RATE] IN BLOOD BY WESTERGREN METHOD: 4 MM/H
EST. GFR  (AFRICAN AMERICAN): >60 ML/MIN/1.73 M^2
EST. GFR  (NON AFRICAN AMERICAN): >60 ML/MIN/1.73 M^2
FIO2: 36
FLOW: 4
FLOW: 4
FLOW: 6
GLUCOSE SERPL-MCNC: 122 MG/DL (ref 70–110)
HCO3 UR-SCNC: 19.7 MMOL/L (ref 24–28)
HCO3 UR-SCNC: 20.7 MMOL/L (ref 24–28)
HCO3 UR-SCNC: 22.1 MMOL/L (ref 24–28)
HCO3 UR-SCNC: 22.3 MMOL/L (ref 24–28)
HCO3 UR-SCNC: 23.2 MMOL/L (ref 24–28)
HCO3 UR-SCNC: 23.5 MMOL/L (ref 24–28)
HCT VFR BLD AUTO: 20.8 % (ref 37–48.5)
HCT VFR BLD AUTO: 25.4 % (ref 37–48.5)
HGB BLD-MCNC: 6.7 G/DL (ref 12–16)
HGB BLD-MCNC: 8.1 G/DL (ref 12–16)
HYPOCHROMIA BLD QL SMEAR: ABNORMAL
IMM GRANULOCYTES # BLD AUTO: 0.06 K/UL (ref 0–0.04)
IMM GRANULOCYTES # BLD AUTO: 0.08 K/UL (ref 0–0.04)
IMM GRANULOCYTES NFR BLD AUTO: 1 % (ref 0–0.5)
IMM GRANULOCYTES NFR BLD AUTO: 1.1 % (ref 0–0.5)
INR PPP: 1.1 (ref 0.8–1.2)
LYMPHOCYTES # BLD AUTO: 0.9 K/UL (ref 1–4.8)
LYMPHOCYTES # BLD AUTO: 0.9 K/UL (ref 1–4.8)
LYMPHOCYTES NFR BLD: 11.5 % (ref 18–48)
LYMPHOCYTES NFR BLD: 15.6 % (ref 18–48)
MAGNESIUM SERPL-MCNC: 2.1 MG/DL (ref 1.6–2.6)
MAGNESIUM SERPL-MCNC: 2.4 MG/DL (ref 1.6–2.6)
MCH RBC QN AUTO: 30.3 PG (ref 27–31)
MCH RBC QN AUTO: 31.3 PG (ref 27–31)
MCHC RBC AUTO-ENTMCNC: 31.9 G/DL (ref 32–36)
MCHC RBC AUTO-ENTMCNC: 32.2 G/DL (ref 32–36)
MCV RBC AUTO: 95 FL (ref 82–98)
MCV RBC AUTO: 97 FL (ref 82–98)
MODE: ABNORMAL
MONOCYTES # BLD AUTO: 0.5 K/UL (ref 0.3–1)
MONOCYTES # BLD AUTO: 0.6 K/UL (ref 0.3–1)
MONOCYTES NFR BLD: 7.2 % (ref 4–15)
MONOCYTES NFR BLD: 8.1 % (ref 4–15)
NEUTROPHILS # BLD AUTO: 4 K/UL (ref 1.8–7.7)
NEUTROPHILS # BLD AUTO: 5.8 K/UL (ref 1.8–7.7)
NEUTROPHILS NFR BLD: 71.9 % (ref 38–73)
NEUTROPHILS NFR BLD: 75.7 % (ref 38–73)
NRBC BLD-RTO: 0 /100 WBC
NRBC BLD-RTO: 0 /100 WBC
OVALOCYTES BLD QL SMEAR: ABNORMAL
PCO2 BLDA: 32.6 MMHG (ref 35–45)
PCO2 BLDA: 33.4 MMHG (ref 35–45)
PCO2 BLDA: 34.8 MMHG (ref 35–45)
PCO2 BLDA: 38.2 MMHG (ref 35–45)
PCO2 BLDA: 38.6 MMHG (ref 35–45)
PCO2 BLDA: 43.4 MMHG (ref 35–45)
PH SMN: 7.34 [PH] (ref 7.35–7.45)
PH SMN: 7.36 [PH] (ref 7.35–7.45)
PH SMN: 7.37 [PH] (ref 7.35–7.45)
PH SMN: 7.37 [PH] (ref 7.35–7.45)
PH SMN: 7.4 [PH] (ref 7.35–7.45)
PH SMN: 7.46 [PH] (ref 7.35–7.45)
PHOSPHATE SERPL-MCNC: 2.6 MG/DL (ref 2.7–4.5)
PLATELET # BLD AUTO: 56 K/UL (ref 150–350)
PLATELET # BLD AUTO: 68 K/UL (ref 150–350)
PLATELET BLD QL SMEAR: ABNORMAL
PMV BLD AUTO: 10.9 FL (ref 9.2–12.9)
PMV BLD AUTO: 10.9 FL (ref 9.2–12.9)
PO2 BLDA: 23 MMHG (ref 40–60)
PO2 BLDA: 56 MMHG (ref 80–100)
PO2 BLDA: 68 MMHG (ref 80–100)
PO2 BLDA: 73 MMHG (ref 80–100)
PO2 BLDA: 79 MMHG (ref 80–100)
PO2 BLDA: 94 MMHG (ref 80–100)
POC BE: -1 MMOL/L
POC BE: -2 MMOL/L
POC BE: -3 MMOL/L
POC BE: -3 MMOL/L
POC BE: -4 MMOL/L
POC BE: -6 MMOL/L
POC SATURATED O2: 36 % (ref 95–100)
POC SATURATED O2: 89 % (ref 95–100)
POC SATURATED O2: 93 % (ref 95–100)
POC SATURATED O2: 94 % (ref 95–100)
POC SATURATED O2: 96 % (ref 95–100)
POC SATURATED O2: 97 % (ref 95–100)
POC TCO2: 21 MMOL/L (ref 23–27)
POC TCO2: 22 MMOL/L (ref 23–27)
POC TCO2: 23 MMOL/L (ref 23–27)
POC TCO2: 23 MMOL/L (ref 23–27)
POC TCO2: 24 MMOL/L (ref 23–27)
POC TCO2: 25 MMOL/L (ref 24–29)
POCT GLUCOSE: 106 MG/DL (ref 70–110)
POCT GLUCOSE: 149 MG/DL (ref 70–110)
POCT GLUCOSE: 83 MG/DL (ref 70–110)
POCT GLUCOSE: 84 MG/DL (ref 70–110)
POCT GLUCOSE: 87 MG/DL (ref 70–110)
POIKILOCYTOSIS BLD QL SMEAR: SLIGHT
POLYCHROMASIA BLD QL SMEAR: ABNORMAL
POTASSIUM SERPL-SCNC: 3.9 MMOL/L (ref 3.5–5.1)
POTASSIUM SERPL-SCNC: 4.3 MMOL/L (ref 3.5–5.1)
PROT SERPL-MCNC: 5.2 G/DL (ref 6–8.4)
PROTHROMBIN TIME: 11 SEC (ref 9–12.5)
RBC # BLD AUTO: 2.14 M/UL (ref 4–5.4)
RBC # BLD AUTO: 2.67 M/UL (ref 4–5.4)
SAMPLE: ABNORMAL
SITE: ABNORMAL
SODIUM SERPL-SCNC: 133 MMOL/L (ref 136–145)
SP02: 97
TRANS PLATPHERESIS VOL PATIENT: NORMAL ML
WBC # BLD AUTO: 5.56 K/UL (ref 3.9–12.7)
WBC # BLD AUTO: 7.63 K/UL (ref 3.9–12.7)

## 2020-03-12 PROCEDURE — 80053 COMPREHEN METABOLIC PANEL: CPT

## 2020-03-12 PROCEDURE — 94799 UNLISTED PULMONARY SVC/PX: CPT

## 2020-03-12 PROCEDURE — P9035 PLATELET PHERES LEUKOREDUCED: HCPCS

## 2020-03-12 PROCEDURE — 97116 GAIT TRAINING THERAPY: CPT

## 2020-03-12 PROCEDURE — 25000003 PHARM REV CODE 250: Performed by: STUDENT IN AN ORGANIZED HEALTH CARE EDUCATION/TRAINING PROGRAM

## 2020-03-12 PROCEDURE — 99900035 HC TECH TIME PER 15 MIN (STAT)

## 2020-03-12 PROCEDURE — 63600175 PHARM REV CODE 636 W HCPCS: Performed by: STUDENT IN AN ORGANIZED HEALTH CARE EDUCATION/TRAINING PROGRAM

## 2020-03-12 PROCEDURE — 84132 ASSAY OF SERUM POTASSIUM: CPT

## 2020-03-12 PROCEDURE — 99233 SBSQ HOSP IP/OBS HIGH 50: CPT | Mod: GC,,, | Performed by: SURGERY

## 2020-03-12 PROCEDURE — 27000646 HC AEROBIKA DEVICE

## 2020-03-12 PROCEDURE — 99233 PR SUBSEQUENT HOSPITAL CARE,LEVL III: ICD-10-PCS | Mod: GC,,, | Performed by: SURGERY

## 2020-03-12 PROCEDURE — 85025 COMPLETE CBC W/AUTO DIFF WBC: CPT | Mod: 91

## 2020-03-12 PROCEDURE — 94761 N-INVAS EAR/PLS OXIMETRY MLT: CPT

## 2020-03-12 PROCEDURE — P9021 RED BLOOD CELLS UNIT: HCPCS

## 2020-03-12 PROCEDURE — 85610 PROTHROMBIN TIME: CPT

## 2020-03-12 PROCEDURE — 25000003 PHARM REV CODE 250: Performed by: SURGERY

## 2020-03-12 PROCEDURE — 82803 BLOOD GASES ANY COMBINATION: CPT

## 2020-03-12 PROCEDURE — 82800 BLOOD PH: CPT

## 2020-03-12 PROCEDURE — 85730 THROMBOPLASTIN TIME PARTIAL: CPT

## 2020-03-12 PROCEDURE — 83735 ASSAY OF MAGNESIUM: CPT | Mod: 91

## 2020-03-12 PROCEDURE — S4991 NICOTINE PATCH NONLEGEND: HCPCS | Performed by: STUDENT IN AN ORGANIZED HEALTH CARE EDUCATION/TRAINING PROGRAM

## 2020-03-12 PROCEDURE — 37799 UNLISTED PX VASCULAR SURGERY: CPT

## 2020-03-12 PROCEDURE — 83735 ASSAY OF MAGNESIUM: CPT

## 2020-03-12 PROCEDURE — 63600175 PHARM REV CODE 636 W HCPCS: Performed by: SURGERY

## 2020-03-12 PROCEDURE — 94664 DEMO&/EVAL PT USE INHALER: CPT

## 2020-03-12 PROCEDURE — 84100 ASSAY OF PHOSPHORUS: CPT

## 2020-03-12 PROCEDURE — 97535 SELF CARE MNGMENT TRAINING: CPT

## 2020-03-12 PROCEDURE — 27000221 HC OXYGEN, UP TO 24 HOURS

## 2020-03-12 PROCEDURE — 20000000 HC ICU ROOM

## 2020-03-12 RX ORDER — METOPROLOL TARTRATE 25 MG/1
12.5 TABLET ORAL ONCE
Status: COMPLETED | OUTPATIENT
Start: 2020-03-12 | End: 2020-03-12

## 2020-03-12 RX ORDER — HYDROCODONE BITARTRATE AND ACETAMINOPHEN 500; 5 MG/1; MG/1
TABLET ORAL
Status: DISCONTINUED | OUTPATIENT
Start: 2020-03-12 | End: 2020-03-16 | Stop reason: HOSPADM

## 2020-03-12 RX ORDER — HYDRALAZINE HYDROCHLORIDE 20 MG/ML
10 INJECTION INTRAMUSCULAR; INTRAVENOUS ONCE
Status: COMPLETED | OUTPATIENT
Start: 2020-03-12 | End: 2020-03-12

## 2020-03-12 RX ORDER — GUAIFENESIN 600 MG/1
600 TABLET, EXTENDED RELEASE ORAL EVERY 12 HOURS PRN
Status: DISCONTINUED | OUTPATIENT
Start: 2020-03-12 | End: 2020-03-16 | Stop reason: HOSPADM

## 2020-03-12 RX ORDER — DOCUSATE SODIUM 100 MG/1
100 CAPSULE, LIQUID FILLED ORAL DAILY
Status: DISCONTINUED | OUTPATIENT
Start: 2020-03-12 | End: 2020-03-16 | Stop reason: HOSPADM

## 2020-03-12 RX ORDER — FUROSEMIDE 10 MG/ML
20 INJECTION INTRAMUSCULAR; INTRAVENOUS ONCE
Status: COMPLETED | OUTPATIENT
Start: 2020-03-12 | End: 2020-03-12

## 2020-03-12 RX ORDER — HYDROMORPHONE HYDROCHLORIDE 1 MG/ML
0.5 INJECTION, SOLUTION INTRAMUSCULAR; INTRAVENOUS; SUBCUTANEOUS ONCE
Status: COMPLETED | OUTPATIENT
Start: 2020-03-12 | End: 2020-03-12

## 2020-03-12 RX ORDER — ONDANSETRON 2 MG/ML
4 INJECTION INTRAMUSCULAR; INTRAVENOUS EVERY 6 HOURS PRN
Status: DISCONTINUED | OUTPATIENT
Start: 2020-03-12 | End: 2020-03-16 | Stop reason: HOSPADM

## 2020-03-12 RX ADMIN — SODIUM PHOSPHATE, MONOBASIC, MONOHYDRATE 15 MMOL: 276; 142 INJECTION, SOLUTION INTRAVENOUS at 07:03

## 2020-03-12 RX ADMIN — ATORVASTATIN CALCIUM 10 MG: 10 TABLET, FILM COATED ORAL at 09:03

## 2020-03-12 RX ADMIN — ONDANSETRON 4 MG: 2 INJECTION INTRAMUSCULAR; INTRAVENOUS at 10:03

## 2020-03-12 RX ADMIN — ACETAMINOPHEN 1000 MG: 500 TABLET ORAL at 05:03

## 2020-03-12 RX ADMIN — ACETAMINOPHEN 1000 MG: 500 TABLET ORAL at 02:03

## 2020-03-12 RX ADMIN — ASPIRIN 325 MG ORAL TABLET 325 MG: 325 PILL ORAL at 08:03

## 2020-03-12 RX ADMIN — NICOTINE 1 PATCH: 7 PATCH, EXTENDED RELEASE TRANSDERMAL at 08:03

## 2020-03-12 RX ADMIN — HYDROMORPHONE HYDROCHLORIDE 0.5 MG: 1 INJECTION, SOLUTION INTRAMUSCULAR; INTRAVENOUS; SUBCUTANEOUS at 02:03

## 2020-03-12 RX ADMIN — CITALOPRAM HYDROBROMIDE 40 MG: 20 TABLET ORAL at 08:03

## 2020-03-12 RX ADMIN — METOPROLOL TARTRATE 12.5 MG: 25 TABLET, FILM COATED ORAL at 09:03

## 2020-03-12 RX ADMIN — DIPHENHYDRAMINE HYDROCHLORIDE 25 MG: 25 CAPSULE ORAL at 05:03

## 2020-03-12 RX ADMIN — PROMETHAZINE HYDROCHLORIDE 12.5 MG: 25 INJECTION INTRAMUSCULAR; INTRAVENOUS at 02:03

## 2020-03-12 RX ADMIN — DOCUSATE SODIUM 100 MG: 50 CAPSULE, LIQUID FILLED ORAL at 08:03

## 2020-03-12 RX ADMIN — MICONAZOLE NITRATE: 20 OINTMENT TOPICAL at 08:03

## 2020-03-12 RX ADMIN — PROMETHAZINE HYDROCHLORIDE 12.5 MG: 25 INJECTION INTRAMUSCULAR; INTRAVENOUS at 05:03

## 2020-03-12 RX ADMIN — MUPIROCIN 1 G: 20 OINTMENT TOPICAL at 09:03

## 2020-03-12 RX ADMIN — OXYCODONE HYDROCHLORIDE 10 MG: 10 TABLET ORAL at 08:03

## 2020-03-12 RX ADMIN — POTASSIUM CHLORIDE 20 MEQ: 200 INJECTION, SOLUTION INTRAVENOUS at 12:03

## 2020-03-12 RX ADMIN — HYDROMORPHONE HYDROCHLORIDE 0.5 MG: 1 INJECTION, SOLUTION INTRAMUSCULAR; INTRAVENOUS; SUBCUTANEOUS at 09:03

## 2020-03-12 RX ADMIN — MICONAZOLE NITRATE: 20 OINTMENT TOPICAL at 11:03

## 2020-03-12 RX ADMIN — HYDROMORPHONE HYDROCHLORIDE 0.5 MG: 1 INJECTION, SOLUTION INTRAMUSCULAR; INTRAVENOUS; SUBCUTANEOUS at 12:03

## 2020-03-12 RX ADMIN — DIPHENHYDRAMINE HYDROCHLORIDE 25 MG: 25 CAPSULE ORAL at 11:03

## 2020-03-12 RX ADMIN — OXYCODONE HYDROCHLORIDE 10 MG: 10 TABLET ORAL at 01:03

## 2020-03-12 RX ADMIN — HYDROMORPHONE HYDROCHLORIDE 0.5 MG: 1 INJECTION, SOLUTION INTRAMUSCULAR; INTRAVENOUS; SUBCUTANEOUS at 03:03

## 2020-03-12 RX ADMIN — HYDROMORPHONE HYDROCHLORIDE 0.5 MG: 1 INJECTION, SOLUTION INTRAMUSCULAR; INTRAVENOUS; SUBCUTANEOUS at 05:03

## 2020-03-12 RX ADMIN — GUAIFENESIN 600 MG: 600 TABLET, EXTENDED RELEASE ORAL at 09:03

## 2020-03-12 RX ADMIN — DIPHENHYDRAMINE HYDROCHLORIDE 25 MG: 25 CAPSULE ORAL at 04:03

## 2020-03-12 RX ADMIN — ONDANSETRON 4 MG: 2 INJECTION INTRAMUSCULAR; INTRAVENOUS at 04:03

## 2020-03-12 RX ADMIN — MUPIROCIN 1 G: 20 OINTMENT TOPICAL at 08:03

## 2020-03-12 RX ADMIN — VASOPRESSIN 0.04 UNITS/MIN: 20 INJECTION INTRAVENOUS at 12:03

## 2020-03-12 RX ADMIN — OXYCODONE HYDROCHLORIDE 10 MG: 10 TABLET ORAL at 07:03

## 2020-03-12 RX ADMIN — FUROSEMIDE 20 MG: 10 INJECTION, SOLUTION INTRAMUSCULAR; INTRAVENOUS at 02:03

## 2020-03-12 RX ADMIN — VASOPRESSIN 0.04 UNITS/MIN: 20 INJECTION INTRAVENOUS at 09:03

## 2020-03-12 RX ADMIN — OXYCODONE HYDROCHLORIDE 10 MG: 10 TABLET ORAL at 04:03

## 2020-03-12 RX ADMIN — ACETAMINOPHEN 1000 MG: 500 TABLET ORAL at 09:03

## 2020-03-12 RX ADMIN — PANTOPRAZOLE SODIUM 40 MG: 40 TABLET, DELAYED RELEASE ORAL at 08:03

## 2020-03-12 RX ADMIN — SODIUM PHOSPHATE, MONOBASIC, MONOHYDRATE 15 MMOL: 276; 142 INJECTION, SOLUTION INTRAVENOUS at 03:03

## 2020-03-12 RX ADMIN — HYDROMORPHONE HYDROCHLORIDE 0.5 MG: 1 INJECTION, SOLUTION INTRAMUSCULAR; INTRAVENOUS; SUBCUTANEOUS at 10:03

## 2020-03-12 RX ADMIN — TRAZODONE HYDROCHLORIDE 50 MG: 50 TABLET ORAL at 09:03

## 2020-03-12 RX ADMIN — POLYETHYLENE GLYCOL 3350 17 G: 17 POWDER, FOR SOLUTION ORAL at 08:03

## 2020-03-12 RX ADMIN — HYDRALAZINE HYDROCHLORIDE 10 MG: 20 INJECTION INTRAMUSCULAR; INTRAVENOUS at 06:03

## 2020-03-12 RX ADMIN — CALCIUM GLUCONATE 1 G: 98 INJECTION, SOLUTION INTRAVENOUS at 05:03

## 2020-03-12 RX ADMIN — ONDANSETRON 4 MG: 2 INJECTION INTRAMUSCULAR; INTRAVENOUS at 12:03

## 2020-03-12 NOTE — PT/OT/SLP PROGRESS
Occupational Therapy   Treatment    Name: Michelle Hodges  MRN: [id number]  Admitting Diagnosis:  S/P AVR (aortic valve replacement)  2 Days Post-Op    Recommendations:     Discharge Recommendations: home health OT  Discharge Equipment Recommendations:  (may benefit from bedside commode)  Barriers to discharge:  None    Assessment:     Michelle Hodges is a 49 y.o. female with a medical diagnosis of S/P AVR (aortic valve replacement).  She presents with improved activity tolerance, though pt with Robson-Adriane catheter in place, thus progression limited. Performance deficits affecting function are weakness, impaired self care skills, impaired balance, impaired functional mobilty, impaired endurance, gait instability, decreased upper extremity function, impaired cardiopulmonary response to activity, pain, decreased safety awareness.     Rehab Prognosis:  Good; patient would benefit from acute skilled OT services to address these deficits and reach maximum level of function.       Plan:     Patient to be seen 5 x/week to address the above listed problems via therapeutic activities, therapeutic exercises, self-care/home management  · Plan of Care Expires: 04/10/20  · Plan of Care Reviewed with: patient, son    Subjective     Pain/Comfort:  · Pain Rating 1: 8/10  · Location - Side 1: Bilateral  · Location - Orientation 1: midline  · Location 1: chest  · Pain Addressed 1: Reposition, Distraction, Pre-medicate for activity  · Pain Rating Post-Intervention 1: 8/10    Objective:     Communicated with: RN prior to session.  Patient found up in chair with blood pressure cuff, pulse ox (continuous), telemetry, oxygen, arterial line, nunez catheter, chest tube(Robson-Adriane catheter) upon OT entry to room.    General Precautions: Standard, fall, sternal   Orthopedic Precautions:    Braces:       Occupational Performance:     Bed Mobility:    · NT     Functional Mobility/Transfers:  · Patient completed Sit <> Stand Transfer with minimum  "assistance  with  no assistive device from b/s chair  · Functional Mobility: Minimal A x 3 steps fwd and back with HHA    Activities of Daily Living:  · Grooming: contact guard assistance with washing face and performing oral hygiene; min A for combing hair  · Upper Body Dressing: moderate assistance    · Lower Body Dressing: moderate assistance        AMPAC 6 Click ADL: 14    Treatment & Education:  Pt ed on OT POC  Pt ed on sternal precautions during ADL  Pt stood at b/s table for grooming tasks x 2 min with CGA  Pt ed on ROM ex's (shoulder flexion and "riding bike")3x daily for increased overall strength and endurance    Patient left up in chair with all lines intact, call button in reach, RN notified and son presentEducation:      GOALS:   Multidisciplinary Problems     Occupational Therapy Goals        Problem: Occupational Therapy Goal    Goal Priority Disciplines Outcome Interventions   Occupational Therapy Goal     OT, PT/OT Ongoing, Progressing    Description:  Goals to be met by: 3/21/20     Patient will increase functional independence with ADLs by performing:    UE Dressing with Supervision.  LE Dressing with Stand-by Assistance.  Grooming while standing at sink with Stand-by Assistance.  Toileting from toilet with Stand-by Assistance for hygiene and clothing management.   Toilet transfer to toilet with Stand-by Assistance.                      Time Tracking:     OT Date of Treatment: 03/12/20  OT Start Time: 0830  OT Stop Time: 0847  OT Total Time (min): 17 min    Billable Minutes:Self Care/Home Management 15    CHERELLE Martel  3/12/2020    "

## 2020-03-12 NOTE — SUBJECTIVE & OBJECTIVE
Interval History/Significant Events: POD#2 AVR (perceval). NAEO, VSS.  ECHO with EF 65%, mild LAE, AV normal without AS or AI, PA pressure 48mmHg. UOP 50-40-30cc the last 3 hrs, greater than 40cc/hr overnight. 1 unit platelets given overnight. Tolerating cardiac diet. Pain mildly controlled PO meds. Vaso at 0.04. CI 2.6, CO 4.6, SvO2 20%.     Follow-up For: Procedure(s) (LRB):  REPLACEMENT, AORTIC VALVE, WITH REPEAT STERNOTOMY (N/A)    Post-Operative Day: 2 Days Post-Op    Objective:     Vital Signs (Most Recent):  Temp: 99 °F (37.2 °C) (03/12/20 0730)  Pulse: 76 (03/12/20 0730)  Resp: (!) 32 (03/12/20 0730)  BP: (!) 105/46 (03/12/20 0730)  SpO2: 96 % (03/12/20 0730) Vital Signs (24h Range):  Temp:  [97.7 °F (36.5 °C)-99 °F (37.2 °C)] 99 °F (37.2 °C)  Pulse:  [76-99] 76  Resp:  [14-43] 32  SpO2:  [72 %-100 %] 96 %  BP: ()/(46-65) 105/46  Arterial Line BP: ()/(37-66) 105/44     Weight: 87.3 kg (192 lb 7.4 oz)  Body mass index is 35.2 kg/m².      Intake/Output Summary (Last 24 hours) at 3/12/2020 0801  Last data filed at 3/12/2020 0730  Gross per 24 hour   Intake 3493.63 ml   Output 1525 ml   Net 1968.63 ml       Physical Exam   Constitutional: She is oriented to person, place, and time. She appears well-developed and well-nourished.   Cardiovascular: Normal rate and intact distal pulses.   Vaso at 0.04   Pulmonary/Chest: Effort normal. No respiratory distress.   5L NC   Abdominal: Soft. She exhibits no distension.   Neurological: She is alert and oriented to person, place, and time.   Skin: Skin is warm and dry.   Nursing note and vitals reviewed.      Vents:  Oxygen Concentration (%): 40 (03/11/20 2030)    Lines/Drains/Airways     Central Venous Catheter Line             Introducer with Double Lumen 03/10/20 right internal jugular 2 days    Pulmonary Artery Catheter Assessment  03/10/20 0725 2 days          Drain                 Urethral Catheter 03/10/20 0745 Non-latex;Straight-tip 16 Fr. 2 days          Y Chest Tube 1 and 2 03/10/20 1401 1 Anterior Mediastinal 19 Fr. 2 Anterior Mediastinal 19 Fr. 1 day         Y Chest Tube 3 and 4 03/10/20 1403 3 Right Pleural 19 Fr. 4 Left Pleural 19 Fr. 1 day          Arterial Line                 Arterial Line 03/10/20 0715 Left Radial 2 days          Line                 Pacer Wires 03/10/20 1401 1 day                Significant Labs:    CBC/Anemia Profile:  Recent Labs   Lab 03/11/20  1500 03/11/20  1717 03/11/20  2200 03/12/20  0345   WBC 7.40  --  5.01 5.56   HGB 8.0*  --  7.5* 6.7*   HCT 25.3* 21* 22.6* 20.8*   PLT 46*  --  29* 68*   MCV 94  --  93 97   RDW 16.2*  --  15.9* 16.2*        Chemistries:  Recent Labs   Lab 03/10/20  1440 03/11/20  0300  03/11/20  1500 03/11/20  2200 03/12/20  0345    137   < > 134* 133* 133*   K 4.7  4.7 3.9   < > 4.3 3.8 4.3    107   < > 106 104 103   CO2 24 22*   < > 21* 23 25   BUN 9 12   < > 11 11 9   CREATININE 0.7 0.7   < > 0.8 0.7 0.7   CALCIUM 8.2* 7.8*   < > 7.7* 7.7* 7.7*   ALBUMIN 1.9* 3.2*  --   --   --  3.5   PROT 3.4* 4.6*  --   --   --  5.2*   BILITOT 0.3 0.2  --   --   --  1.4*   ALKPHOS 29* 28*  --   --   --  31*   ALT 11 12  --   --   --  32   AST 27 37  --   --   --  54*   MG 2.5 2.0   < > 1.8 2.4 2.4   PHOS 4.3 3.3   < > 2.4* 2.5* 2.6*    < > = values in this interval not displayed.         Significant Imaging:  I have reviewed all pertinent imaging results/findings within the past 24 hours.

## 2020-03-12 NOTE — PLAN OF CARE
"      SICU PLAN OF CARE NOTE    Dx: S/P AVR (aortic valve replacement)    Shift Events: Pt up in chair this morning; however became hypotensive with increasing pressor requirements. Pt placed back in bed. Epi gtt maintained at 0.05 mcg/kg/min and vaso gtt started this AM. MAP goals 60-80. PCA d/pramod this AM and transitioned to PO meds. Pt experiencing frequent right sided chest pain mildly relieved with PRN pain meds; better pain control obtained this afternoon. 1L albumin and 1 platelets administered this shift. CO 5-7 and CI 2.7-4.4. CVP 15 this and down to 12 this afternoon. Labs trended Q4H. BONIFACIO Tompkins MD updated and at bedside throughout shift.     Goals of Care: Wean gtts as tolerated. Pain control. Out of bed to chair.     Neuro: AAO x4, Follows Commands and Moves All Extremities    Vital Signs: BP (!) 108/57 (BP Location: Right arm, Patient Position: Lying)   Pulse 94   Temp 97.7 °F (36.5 °C) (Oral)   Resp (!) 22   Ht 5' 2" (1.575 m)   Wt 72.6 kg (160 lb)   SpO2 96%   Breastfeeding? No   BMI 29.26 kg/m²     Respiratory: Nasal Cannula    Diet: Cardiac Diet    Gtts: Vasopressin and Epinephrine    Urine Output: Urinary Catheter 505 cc/shift    Drains: Chest Tube, total output 340 cc /  shift     Labs/Accuchecks: CBC, BMP, APTT, PT/INR, Mag, Phos Q4H. Accuchecks AC/HS and 2 am.     Skin: No new skin breakdown noted. Generalized bruising buttocks. Bruising to right abdomen and left thigh. Midsternal incision with telfa island dressing remains clean, dry, intact.  Foam dressings to pressure points in place. Waffle overlay in place and inflated and mattress surface appropriate for pt's weight. Valerie with wound care this AM to assess pt; recommendations carried out.        "

## 2020-03-12 NOTE — CARE UPDATE
BG goal 140-180    Remains in ICU, NAEON. 2 Days Post-Op. Diet Cardiac.   BG well controlled without insulin.     Plan:  BG monitoring ac/hs and low dose correction scale.   No history of DM. If no futher insulin/endocrine needs, will sign off soon.       Discharge planning: likely no needs     Endocrine to continue to follow    ** Please call Endocrine for any BG related issues **

## 2020-03-12 NOTE — PT/OT/SLP PROGRESS
Physical Therapy Treatment    Patient Name:  Michelle Hodges   MRN:  [id number]    Recommendations:     Discharge Recommendations:  home health PT   Discharge Equipment Recommendations: (will determine DME needs closer to discharge)   Barriers to discharge: None    Assessment:     Michelle Hodges is a 49 y.o. female admitted with a medical diagnosis of S/P AVR (aortic valve replacement).  She presents with the following impairments/functional limitations:  impaired endurance, gait instability, impaired functional mobilty, impaired balance, decreased lower extremity function, weakness. pt chelsea treatment better being able to stand and begin gait training. Pt will benefit from cont skilled PT 5x/wk to progress physically. Pt should be able to discharge home with HHPT and DME to be determined closer to discharge. Pt is s/p AVR 3/10/20.    Rehab Prognosis: Good; patient would benefit from acute skilled PT services to address these deficits and reach maximum level of function.    Recent Surgery: Procedure(s) (LRB):  REPLACEMENT, AORTIC VALVE, WITH REPEAT STERNOTOMY (N/A) 2 Days Post-Op    Plan:     During this hospitalization, patient to be seen 5 x/week to address the identified rehab impairments via gait training, therapeutic activities, therapeutic exercises and progress toward the following goals:    · Plan of Care Expires:  04/09/20    Subjective     Chief Complaint: pt c/o pain during treatment.   Patient/Family Comments/goals:  To get better and go home.   Pain/Comfort:  · Pain Rating 1: 8/10(chest pain)  · Pain Rating Post-Intervention 1: 8/10      Objective:     Communicated with nurse prior to session.  Patient found up in chair with telemetry, arterial line, pulse ox (continuous), oxygen, central line, chest tube, nunez catheter(CVP) upon PT entry to room.     General Precautions: Standard, sternal, fall   Orthopedic Precautions:    Braces:       Functional Mobility:  · Transfers:     · Sit to Stand:  minimum  assistance with hand-held assist  ·   · Gait: pt received gait training ~ 3 steps forward/backward with min assist. pt was CGA standing at bedside table to perform ADLS with OT.  Distance pt ambulated limited by medical lines.   ·       AM-PAC 6 CLICK MOBILITY  Turning over in bed (including adjusting bedclothes, sheets and blankets)?: 2  Sitting down on and standing up from a chair with arms (e.g., wheelchair, bedside commode, etc.): 3  Moving from lying on back to sitting on the side of the bed?: 2  Moving to and from a bed to a chair (including a wheelchair)?: 3  Need to walk in hospital room?: 2  Climbing 3-5 steps with a railing?: 1  Basic Mobility Total Score: 13       Therapeutic Activities and Exercises:   pt and son received verbal instruction in role of PT and POC. Both verbally expressed understanding of such.     Patient left up in chair with all lines intact, call button in reach and son present..    GOALS:   Multidisciplinary Problems     Physical Therapy Goals        Problem: Physical Therapy Goal    Goal Priority Disciplines Outcome Goal Variances Interventions   Physical Therapy Goal     PT, PT/OT Ongoing, Progressing     Description:  Goals to be met by: 3/25/20    Patient will increase functional independence with mobility by performin. Supine to sit with Contact Guard Assistance -not met  2. Sit to stand transfer with Contact Guard Assistance -not met  3. Gait  x 150 feet with Contact Guard Assistance  -not met  4. Lower extremity exercise program x15 reps  with supervision -not met                      Time Tracking:     PT Received On: 20  PT Start Time: 834     PT Stop Time: 849  PT Total Time (min): 15 min     Billable Minutes: Gait Training 15 min    Treatment Type: Treatment  PT/PTA: PT     PTA Visit Number: 0     Kanchan Carter, PT  2020

## 2020-03-12 NOTE — PROGRESS NOTES
[facility]  Critical Care - Surgery  Progress Note    Patient Name: Michelle Hodges  MRN: [id number]  Admission Date: 3/10/2020  Hospital Length of Stay: 2 days  Code Status: Full Code  Attending Provider: Jack Jara MD  Primary Care Provider: Prem Giron Jr, MD   Principal Problem: S/P AVR (aortic valve replacement)    Subjective:       Interval History/Significant Events: POD#2 AVR (perceval). NAEO, VSS.  ECHO with EF 65%, mild LAE, AV normal without AS or AI, PA pressure 48mmHg. UOP 50-40-30cc the last 3 hrs, greater than 40cc/hr overnight. 1 unit platelets given overnight. Tolerating cardiac diet. Pain mildly controlled PO meds. Vaso at 0.04. CI 2.6, CO 4.6, SvO2 20%.     Follow-up For: Procedure(s) (LRB):  REPLACEMENT, AORTIC VALVE, WITH REPEAT STERNOTOMY (N/A)    Post-Operative Day: 2 Days Post-Op    Objective:     Vital Signs (Most Recent):  Temp: 99 °F (37.2 °C) (03/12/20 0730)  Pulse: 76 (03/12/20 0730)  Resp: (!) 32 (03/12/20 0730)  BP: (!) 105/46 (03/12/20 0730)  SpO2: 96 % (03/12/20 0730) Vital Signs (24h Range):  Temp:  [97.7 °F (36.5 °C)-99 °F (37.2 °C)] 99 °F (37.2 °C)  Pulse:  [76-99] 76  Resp:  [14-43] 32  SpO2:  [72 %-100 %] 96 %  BP: ()/(46-65) 105/46  Arterial Line BP: ()/(37-66) 105/44     Weight: 87.3 kg (192 lb 7.4 oz)  Body mass index is 35.2 kg/m².      Intake/Output Summary (Last 24 hours) at 3/12/2020 0801  Last data filed at 3/12/2020 0730  Gross per 24 hour   Intake 3493.63 ml   Output 1525 ml   Net 1968.63 ml       Physical Exam   Constitutional: She is oriented to person, place, and time. She appears well-developed and well-nourished.   Cardiovascular: Normal rate and intact distal pulses.   Vaso at 0.04   Pulmonary/Chest: Effort normal. No respiratory distress.   5L NC   Abdominal: Soft. She exhibits no distension.   Neurological: She is alert and oriented to person, place, and time.   Skin: Skin is warm and dry.   Nursing note and vitals  reviewed.      Vents:  Oxygen Concentration (%): 40 (03/11/20 2030)    Lines/Drains/Airways     Central Venous Catheter Line             Introducer with Double Lumen 03/10/20 right internal jugular 2 days    Pulmonary Artery Catheter Assessment  03/10/20 0725 2 days          Drain                 Urethral Catheter 03/10/20 0745 Non-latex;Straight-tip 16 Fr. 2 days         Y Chest Tube 1 and 2 03/10/20 1401 1 Anterior Mediastinal 19 Fr. 2 Anterior Mediastinal 19 Fr. 1 day         Y Chest Tube 3 and 4 03/10/20 1403 3 Right Pleural 19 Fr. 4 Left Pleural 19 Fr. 1 day          Arterial Line                 Arterial Line 03/10/20 0715 Left Radial 2 days          Line                 Pacer Wires 03/10/20 1401 1 day                Significant Labs:    CBC/Anemia Profile:  Recent Labs   Lab 03/11/20  1500 03/11/20  1717 03/11/20  2200 03/12/20  0345   WBC 7.40  --  5.01 5.56   HGB 8.0*  --  7.5* 6.7*   HCT 25.3* 21* 22.6* 20.8*   PLT 46*  --  29* 68*   MCV 94  --  93 97   RDW 16.2*  --  15.9* 16.2*        Chemistries:  Recent Labs   Lab 03/10/20  1440 03/11/20  0300  03/11/20  1500 03/11/20  2200 03/12/20  0345    137   < > 134* 133* 133*   K 4.7  4.7 3.9   < > 4.3 3.8 4.3    107   < > 106 104 103   CO2 24 22*   < > 21* 23 25   BUN 9 12   < > 11 11 9   CREATININE 0.7 0.7   < > 0.8 0.7 0.7   CALCIUM 8.2* 7.8*   < > 7.7* 7.7* 7.7*   ALBUMIN 1.9* 3.2*  --   --   --  3.5   PROT 3.4* 4.6*  --   --   --  5.2*   BILITOT 0.3 0.2  --   --   --  1.4*   ALKPHOS 29* 28*  --   --   --  31*   ALT 11 12  --   --   --  32   AST 27 37  --   --   --  54*   MG 2.5 2.0   < > 1.8 2.4 2.4   PHOS 4.3 3.3   < > 2.4* 2.5* 2.6*    < > = values in this interval not displayed.         Significant Imaging:  I have reviewed all pertinent imaging results/findings within the past 24 hours.    Assessment/Plan:     * S/P AVR (aortic valve replacement)  Michelle Hodges is a 49 y.o. female presents to SICU s/p RE-REPLACEMENT, AORTIC VALVE with a  small LivaNova Perceval pericardial prosthesis. On 3L NC, epi 0.06. H/H and plt down-trend today, plt running currently. Will add vaso 0.02 for low diastolic pressures, increased to 0.04. ECHO yesterday revealed adequate AV placement without AV or AS.     Neuro:  Sedation: None  Pain control: Scheduled tylenol, oxy, fentanyl    Resp:  Extubated  5L NC, wean as tolerated  Hx on home O2  Monitor CT outputs, to suction  CT output 1, R 189  CT output 2, L 287  CXR increased haziness compared to yesterday   Daily  CXR    CV:  Off levo, epi  Vaso 0.04   CI 2.6, CO 4.6, SvO2 20% this morning   ECHO 3/11 revealed adequate AV placement, no AI or AS  MAP 60-80    Heme/ID:  H/H down-trended 6.7 (7.5) / 20.8  Periop Ancef completed   HIT panel send out serotonin assay yesterday   Platelets overnight     Renal:  Jiménez in place  UOP 1000, net +2.46   UOP overnight >40cc  Strict I/Os  BUN Cr, today 15/0.8      FEN/GI:  Cardiac diet   Passed swallow study, PO meds  Replace lytes PRN    Endo:  Accuchecks  BG goal <180    PPX:  Protonix  Sub Q Hep    Dispo: Continue ICU care         Critical care was time spent personally by me on the following activities: development of treatment plan with patient or surrogate and bedside caregivers, discussions with consultants, evaluation of patient's response to treatment, examination of patient, ordering and performing treatments and interventions, ordering and review of laboratory studies, ordering and review of radiographic studies, pulse oximetry, re-evaluation of patient's condition.  This critical care time did not overlap with that of any other provider or involve time for any procedures.     Dee Doe MD  Critical Care - Surgery  [facility]

## 2020-03-12 NOTE — PLAN OF CARE
Plan of Care Note  Cardiothoracic Surgery    Michelle Hodges is a 49 y.o. female with aortic stenosis after mechanical AVR in 2012 underwent tissue AVR (Perceval) (3/10/20)    Specific issues: wean vaso as tolerated; transfuse 1u pRBC, lasix this afternoon; d/c PA catheter; f/u HIT panel    Plan of care for patient was discussed with ICU staff including nurses, residents, and faculty and appropriate consulting services.    Will continue to monitor patient's hemodynamics, functionality, neuro status, fluid status and renal function, and labs and will adjust medications and fluids as necessary while monitoring appropriateness for de-escalation of support and monitoring and transport to stepdown unit.    Robert Bond MD  Cardiothoracic Surgery Fellow

## 2020-03-12 NOTE — PLAN OF CARE
No acute events this shift. AAOx4. Afebrile. MAP 60-80. HR: 70-80s, NSR.  5 L NC, SpO2 > 92%  Gtts: Vaso @ 0.04 units/hr  Chest tube output: 20-30 cc/hr, serosanginous  UO: > 40 cc/hr  See flowsheets for [place] numbers.  See flowsheets for skin assessment and care.  PRN 10 mg Oxy, 0.5 mg Dilaudid, Zofran, and Phenergan given this shift.  Plan of care reviewed with pt and family. All questions and concerns addressed. All VSS at this time.

## 2020-03-12 NOTE — PLAN OF CARE
Problem: Physical Therapy Goal  Goal: Physical Therapy Goal  Description  Goals to be met by: 3/25/20    Patient will increase functional independence with mobility by performin. Supine to sit with Contact Guard Assistance -not met  2. Sit to stand transfer with Contact Guard Assistance -not met  3. Gait  x 150 feet with Contact Guard Assistance  -not met  4. Lower extremity exercise program x15 reps  with supervision -not met     Outcome: Ongoing, Progressing   Goals remain appropriate. Kanchan Carter, PT  3/12/2020

## 2020-03-12 NOTE — PLAN OF CARE
SW met with patient's daughter-in-law at bedside as patient was sleeping. SW provided information regarding recommendation for HH. Patient's family stated that they are unsure if patient will want HH. SW left her contact information for patient to reach to out with any questions and SW will follow-up at a later time to see patient when she is awake. SW left a list of in-network HH providers with patient's family for her to review, as well.    SW will continue to coordinate with patient, family, team and insurance to complete patient's discharge plan.       03/12/20 1038   Post-Acute Status   Post-Acute Authorization Home Health/Hospice   Home Health/Hospice Status   (Patient List Provided)     UPDATE 2:58 PM    SW attempted to meet with patient and family to review discharge plan of HH and provide a list of in-network providers; however, FRAN unable to do so due to patient being attended to by other members of the treatment team.      Samia Abdi LMSW   - Case Management

## 2020-03-12 NOTE — PLAN OF CARE
Problem: Occupational Therapy Goal  Goal: Occupational Therapy Goal  Description  Goals to be met by: 3/21/20     Patient will increase functional independence with ADLs by performing:    UE Dressing with Supervision.  LE Dressing with Stand-by Assistance.  Grooming while standing at sink with Stand-by Assistance.  Toileting from toilet with Stand-by Assistance for hygiene and clothing management.   Toilet transfer to toilet with Stand-by Assistance.     Outcome: Ongoing, Progressing   The above goals remain appropriate. CHERELLE Martel  3/12/2020

## 2020-03-13 ENCOUNTER — RESEARCH ENCOUNTER (OUTPATIENT)
Dept: RESEARCH | Facility: HOSPITAL | Age: 50
End: 2020-03-13

## 2020-03-13 LAB
ALBUMIN SERPL BCP-MCNC: 3.2 G/DL (ref 3.5–5.2)
ALLENS TEST: ABNORMAL
ALLENS TEST: ABNORMAL
ALP SERPL-CCNC: 50 U/L (ref 55–135)
ALT SERPL W/O P-5'-P-CCNC: 53 U/L (ref 10–44)
ANION GAP SERPL CALC-SCNC: 10 MMOL/L (ref 8–16)
ANION GAP SERPL CALC-SCNC: 8 MMOL/L (ref 8–16)
ANISOCYTOSIS BLD QL SMEAR: SLIGHT
ANISOCYTOSIS BLD QL SMEAR: SLIGHT
APTT BLDCRRT: 27.5 SEC (ref 21–32)
AST SERPL-CCNC: 54 U/L (ref 10–40)
BASOPHILS # BLD AUTO: 0.01 K/UL (ref 0–0.2)
BASOPHILS # BLD AUTO: 0.01 K/UL (ref 0–0.2)
BASOPHILS NFR BLD: 0.1 % (ref 0–1.9)
BASOPHILS NFR BLD: 0.2 % (ref 0–1.9)
BILIRUB SERPL-MCNC: 0.7 MG/DL (ref 0.1–1)
BUN SERPL-MCNC: 10 MG/DL (ref 6–20)
BUN SERPL-MCNC: 7 MG/DL (ref 6–20)
CALCIUM SERPL-MCNC: 8.1 MG/DL (ref 8.7–10.5)
CALCIUM SERPL-MCNC: 8.6 MG/DL (ref 8.7–10.5)
CHLORIDE SERPL-SCNC: 104 MMOL/L (ref 95–110)
CHLORIDE SERPL-SCNC: 106 MMOL/L (ref 95–110)
CO2 SERPL-SCNC: 25 MMOL/L (ref 23–29)
CO2 SERPL-SCNC: 25 MMOL/L (ref 23–29)
CREAT SERPL-MCNC: 0.6 MG/DL (ref 0.5–1.4)
CREAT SERPL-MCNC: 0.7 MG/DL (ref 0.5–1.4)
DELSYS: ABNORMAL
DELSYS: ABNORMAL
DIFFERENTIAL METHOD: ABNORMAL
DIFFERENTIAL METHOD: ABNORMAL
EOSINOPHIL # BLD AUTO: 0.2 K/UL (ref 0–0.5)
EOSINOPHIL # BLD AUTO: 0.3 K/UL (ref 0–0.5)
EOSINOPHIL NFR BLD: 3.5 % (ref 0–8)
EOSINOPHIL NFR BLD: 3.9 % (ref 0–8)
ERYTHROCYTE [DISTWIDTH] IN BLOOD BY AUTOMATED COUNT: 15.6 % (ref 11.5–14.5)
ERYTHROCYTE [DISTWIDTH] IN BLOOD BY AUTOMATED COUNT: 15.8 % (ref 11.5–14.5)
EST. GFR  (AFRICAN AMERICAN): >60 ML/MIN/1.73 M^2
EST. GFR  (AFRICAN AMERICAN): >60 ML/MIN/1.73 M^2
EST. GFR  (NON AFRICAN AMERICAN): >60 ML/MIN/1.73 M^2
EST. GFR  (NON AFRICAN AMERICAN): >60 ML/MIN/1.73 M^2
FLOW: 5
FLOW: 6
GLUCOSE SERPL-MCNC: 80 MG/DL (ref 70–110)
GLUCOSE SERPL-MCNC: 81 MG/DL (ref 70–110)
HCO3 UR-SCNC: 22.1 MMOL/L (ref 24–28)
HCO3 UR-SCNC: 23.9 MMOL/L (ref 24–28)
HCT VFR BLD AUTO: 26.2 % (ref 37–48.5)
HCT VFR BLD AUTO: 26.8 % (ref 37–48.5)
HGB BLD-MCNC: 8.1 G/DL (ref 12–16)
HGB BLD-MCNC: 8.4 G/DL (ref 12–16)
HYPOCHROMIA BLD QL SMEAR: ABNORMAL
IMM GRANULOCYTES # BLD AUTO: 0.03 K/UL (ref 0–0.04)
IMM GRANULOCYTES # BLD AUTO: 0.04 K/UL (ref 0–0.04)
IMM GRANULOCYTES NFR BLD AUTO: 0.4 % (ref 0–0.5)
IMM GRANULOCYTES NFR BLD AUTO: 0.7 % (ref 0–0.5)
INR PPP: 1 (ref 0.8–1.2)
LYMPHOCYTES # BLD AUTO: 0.8 K/UL (ref 1–4.8)
LYMPHOCYTES # BLD AUTO: 1.2 K/UL (ref 1–4.8)
LYMPHOCYTES NFR BLD: 13.4 % (ref 18–48)
LYMPHOCYTES NFR BLD: 17.7 % (ref 18–48)
MAGNESIUM SERPL-MCNC: 2.2 MG/DL (ref 1.6–2.6)
MAGNESIUM SERPL-MCNC: 2.2 MG/DL (ref 1.6–2.6)
MCH RBC QN AUTO: 29.5 PG (ref 27–31)
MCH RBC QN AUTO: 29.8 PG (ref 27–31)
MCHC RBC AUTO-ENTMCNC: 30.9 G/DL (ref 32–36)
MCHC RBC AUTO-ENTMCNC: 31.3 G/DL (ref 32–36)
MCV RBC AUTO: 95 FL (ref 82–98)
MCV RBC AUTO: 95 FL (ref 82–98)
MODE: ABNORMAL
MODE: ABNORMAL
MONOCYTES # BLD AUTO: 0.5 K/UL (ref 0.3–1)
MONOCYTES # BLD AUTO: 0.6 K/UL (ref 0.3–1)
MONOCYTES NFR BLD: 8.2 % (ref 4–15)
MONOCYTES NFR BLD: 9.5 % (ref 4–15)
NEUTROPHILS # BLD AUTO: 4.4 K/UL (ref 1.8–7.7)
NEUTROPHILS # BLD AUTO: 4.6 K/UL (ref 1.8–7.7)
NEUTROPHILS NFR BLD: 68.4 % (ref 38–73)
NEUTROPHILS NFR BLD: 74 % (ref 38–73)
NRBC BLD-RTO: 0 /100 WBC
NRBC BLD-RTO: 0 /100 WBC
OVALOCYTES BLD QL SMEAR: ABNORMAL
OVALOCYTES BLD QL SMEAR: ABNORMAL
PCO2 BLDA: 36.6 MMHG (ref 35–45)
PCO2 BLDA: 40.5 MMHG (ref 35–45)
PH SMN: 7.38 [PH] (ref 7.35–7.45)
PH SMN: 7.39 [PH] (ref 7.35–7.45)
PHOSPHATE SERPL-MCNC: 2.1 MG/DL (ref 2.7–4.5)
PLATELET # BLD AUTO: 37 K/UL (ref 150–350)
PLATELET # BLD AUTO: 39 K/UL (ref 150–350)
PLATELET BLD QL SMEAR: ABNORMAL
PLATELET BLD QL SMEAR: ABNORMAL
PMV BLD AUTO: 11.3 FL (ref 9.2–12.9)
PMV BLD AUTO: 12.3 FL (ref 9.2–12.9)
PO2 BLDA: 82 MMHG (ref 80–100)
PO2 BLDA: 99 MMHG (ref 80–100)
POC BE: -1 MMOL/L
POC BE: -3 MMOL/L
POC SATURATED O2: 96 % (ref 95–100)
POC SATURATED O2: 98 % (ref 95–100)
POC TCO2: 23 MMOL/L (ref 23–27)
POC TCO2: 25 MMOL/L (ref 23–27)
POCT GLUCOSE: 72 MG/DL (ref 70–110)
POCT GLUCOSE: 84 MG/DL (ref 70–110)
POIKILOCYTOSIS BLD QL SMEAR: SLIGHT
POIKILOCYTOSIS BLD QL SMEAR: SLIGHT
POLYCHROMASIA BLD QL SMEAR: ABNORMAL
POTASSIUM SERPL-SCNC: 3.6 MMOL/L (ref 3.5–5.1)
POTASSIUM SERPL-SCNC: 3.8 MMOL/L (ref 3.5–5.1)
PROT SERPL-MCNC: 5.3 G/DL (ref 6–8.4)
PROTHROMBIN TIME: 10.1 SEC (ref 9–12.5)
RBC # BLD AUTO: 2.75 M/UL (ref 4–5.4)
RBC # BLD AUTO: 2.82 M/UL (ref 4–5.4)
SAMPLE: ABNORMAL
SAMPLE: ABNORMAL
SITE: ABNORMAL
SITE: ABNORMAL
SODIUM SERPL-SCNC: 139 MMOL/L (ref 136–145)
SODIUM SERPL-SCNC: 139 MMOL/L (ref 136–145)
WBC # BLD AUTO: 5.97 K/UL (ref 3.9–12.7)
WBC # BLD AUTO: 6.74 K/UL (ref 3.9–12.7)

## 2020-03-13 PROCEDURE — 80053 COMPREHEN METABOLIC PANEL: CPT

## 2020-03-13 PROCEDURE — 36415 COLL VENOUS BLD VENIPUNCTURE: CPT

## 2020-03-13 PROCEDURE — 97535 SELF CARE MNGMENT TRAINING: CPT

## 2020-03-13 PROCEDURE — 25000003 PHARM REV CODE 250: Performed by: STUDENT IN AN ORGANIZED HEALTH CARE EDUCATION/TRAINING PROGRAM

## 2020-03-13 PROCEDURE — 94761 N-INVAS EAR/PLS OXIMETRY MLT: CPT

## 2020-03-13 PROCEDURE — 37799 UNLISTED PX VASCULAR SURGERY: CPT

## 2020-03-13 PROCEDURE — 99900035 HC TECH TIME PER 15 MIN (STAT)

## 2020-03-13 PROCEDURE — 82800 BLOOD PH: CPT

## 2020-03-13 PROCEDURE — 85025 COMPLETE CBC W/AUTO DIFF WBC: CPT | Mod: 91

## 2020-03-13 PROCEDURE — 25000003 PHARM REV CODE 250: Performed by: SURGERY

## 2020-03-13 PROCEDURE — 85730 THROMBOPLASTIN TIME PARTIAL: CPT

## 2020-03-13 PROCEDURE — 84100 ASSAY OF PHOSPHORUS: CPT

## 2020-03-13 PROCEDURE — 63600175 PHARM REV CODE 636 W HCPCS: Performed by: STUDENT IN AN ORGANIZED HEALTH CARE EDUCATION/TRAINING PROGRAM

## 2020-03-13 PROCEDURE — 97116 GAIT TRAINING THERAPY: CPT

## 2020-03-13 PROCEDURE — 63600175 PHARM REV CODE 636 W HCPCS: Performed by: SURGERY

## 2020-03-13 PROCEDURE — 94664 DEMO&/EVAL PT USE INHALER: CPT

## 2020-03-13 PROCEDURE — 83735 ASSAY OF MAGNESIUM: CPT

## 2020-03-13 PROCEDURE — 99233 PR SUBSEQUENT HOSPITAL CARE,LEVL III: ICD-10-PCS | Mod: GC,,, | Performed by: SURGERY

## 2020-03-13 PROCEDURE — 20600001 HC STEP DOWN PRIVATE ROOM

## 2020-03-13 PROCEDURE — 80048 BASIC METABOLIC PNL TOTAL CA: CPT

## 2020-03-13 PROCEDURE — 83735 ASSAY OF MAGNESIUM: CPT | Mod: 91

## 2020-03-13 PROCEDURE — 27000221 HC OXYGEN, UP TO 24 HOURS

## 2020-03-13 PROCEDURE — 99233 SBSQ HOSP IP/OBS HIGH 50: CPT | Mod: GC,,, | Performed by: SURGERY

## 2020-03-13 PROCEDURE — 82803 BLOOD GASES ANY COMBINATION: CPT

## 2020-03-13 PROCEDURE — 85610 PROTHROMBIN TIME: CPT

## 2020-03-13 PROCEDURE — S4991 NICOTINE PATCH NONLEGEND: HCPCS | Performed by: STUDENT IN AN ORGANIZED HEALTH CARE EDUCATION/TRAINING PROGRAM

## 2020-03-13 RX ORDER — METOPROLOL TARTRATE 25 MG/1
25 TABLET, FILM COATED ORAL 2 TIMES DAILY
Status: DISCONTINUED | OUTPATIENT
Start: 2020-03-13 | End: 2020-03-14

## 2020-03-13 RX ORDER — POTASSIUM CHLORIDE 20 MEQ/15ML
40 SOLUTION ORAL ONCE
Status: COMPLETED | OUTPATIENT
Start: 2020-03-13 | End: 2020-03-13

## 2020-03-13 RX ORDER — HYDROCODONE BITARTRATE AND ACETAMINOPHEN 500; 5 MG/1; MG/1
TABLET ORAL
Status: DISCONTINUED | OUTPATIENT
Start: 2020-03-13 | End: 2020-03-16 | Stop reason: HOSPADM

## 2020-03-13 RX ORDER — FUROSEMIDE 10 MG/ML
20 INJECTION INTRAMUSCULAR; INTRAVENOUS
Status: DISCONTINUED | OUTPATIENT
Start: 2020-03-13 | End: 2020-03-14

## 2020-03-13 RX ADMIN — METOPROLOL TARTRATE 25 MG: 25 TABLET ORAL at 08:03

## 2020-03-13 RX ADMIN — HYDROMORPHONE HYDROCHLORIDE 0.5 MG: 1 INJECTION, SOLUTION INTRAMUSCULAR; INTRAVENOUS; SUBCUTANEOUS at 10:03

## 2020-03-13 RX ADMIN — DOCUSATE SODIUM 100 MG: 50 CAPSULE, LIQUID FILLED ORAL at 08:03

## 2020-03-13 RX ADMIN — DIPHENHYDRAMINE HYDROCHLORIDE 25 MG: 25 CAPSULE ORAL at 07:03

## 2020-03-13 RX ADMIN — MUPIROCIN 1 G: 20 OINTMENT TOPICAL at 08:03

## 2020-03-13 RX ADMIN — SODIUM PHOSPHATE, MONOBASIC, MONOHYDRATE 20.01 MMOL: 276; 142 INJECTION, SOLUTION INTRAVENOUS at 07:03

## 2020-03-13 RX ADMIN — ATORVASTATIN CALCIUM 10 MG: 10 TABLET, FILM COATED ORAL at 08:03

## 2020-03-13 RX ADMIN — TRAZODONE HYDROCHLORIDE 50 MG: 50 TABLET ORAL at 08:03

## 2020-03-13 RX ADMIN — ACETAMINOPHEN 650 MG: 325 TABLET ORAL at 08:03

## 2020-03-13 RX ADMIN — CITALOPRAM HYDROBROMIDE 40 MG: 20 TABLET ORAL at 08:03

## 2020-03-13 RX ADMIN — HYDROMORPHONE HYDROCHLORIDE 0.5 MG: 1 INJECTION, SOLUTION INTRAMUSCULAR; INTRAVENOUS; SUBCUTANEOUS at 06:03

## 2020-03-13 RX ADMIN — MICONAZOLE NITRATE: 20 OINTMENT TOPICAL at 08:03

## 2020-03-13 RX ADMIN — NICOTINE 1 PATCH: 7 PATCH, EXTENDED RELEASE TRANSDERMAL at 08:03

## 2020-03-13 RX ADMIN — OXYCODONE HYDROCHLORIDE 10 MG: 10 TABLET ORAL at 07:03

## 2020-03-13 RX ADMIN — POTASSIUM CHLORIDE 20 MEQ: 200 INJECTION, SOLUTION INTRAVENOUS at 05:03

## 2020-03-13 RX ADMIN — ASPIRIN 325 MG ORAL TABLET 325 MG: 325 PILL ORAL at 08:03

## 2020-03-13 RX ADMIN — HYDROMORPHONE HYDROCHLORIDE 0.5 MG: 1 INJECTION, SOLUTION INTRAMUSCULAR; INTRAVENOUS; SUBCUTANEOUS at 03:03

## 2020-03-13 RX ADMIN — OXYCODONE HYDROCHLORIDE 10 MG: 10 TABLET ORAL at 01:03

## 2020-03-13 RX ADMIN — HYDROMORPHONE HYDROCHLORIDE 0.5 MG: 1 INJECTION, SOLUTION INTRAMUSCULAR; INTRAVENOUS; SUBCUTANEOUS at 02:03

## 2020-03-13 RX ADMIN — OXYCODONE HYDROCHLORIDE 10 MG: 10 TABLET ORAL at 12:03

## 2020-03-13 RX ADMIN — PANTOPRAZOLE SODIUM 40 MG: 40 TABLET, DELAYED RELEASE ORAL at 08:03

## 2020-03-13 RX ADMIN — ONDANSETRON 4 MG: 2 INJECTION INTRAMUSCULAR; INTRAVENOUS at 05:03

## 2020-03-13 RX ADMIN — OXYCODONE HYDROCHLORIDE 10 MG: 10 TABLET ORAL at 05:03

## 2020-03-13 RX ADMIN — FUROSEMIDE 20 MG: 10 INJECTION, SOLUTION INTRAMUSCULAR; INTRAVENOUS at 08:03

## 2020-03-13 RX ADMIN — POTASSIUM CHLORIDE 40 MEQ: 20 SOLUTION ORAL at 09:03

## 2020-03-13 RX ADMIN — POLYETHYLENE GLYCOL 3350 17 G: 17 POWDER, FOR SOLUTION ORAL at 08:03

## 2020-03-13 NOTE — PROGRESS NOTES
Trial: [employer] on Aortic Valve Replacements SURE AVR  Sponsor: [employer]  IRB Number: [id number]  Principle Investigator: Dr. Jack Jara  Others present: , sister, daughter  Is LAR consenting for subject? No     The subject was  confirmed by  2 unique patient identifiers. I asked if there were any preliminary questions.   The Informed Consent was presented in English and reviewed with the subject . The patient verbalized understanding.   The potential subject and I discussed the trial in full detail, including but not limited to: the purpose of the study and the inclusions for participation, the voluntary nature of participation, the study procedures and expectations, the potential adverse events, the risks and benefits, the alternatives if subject decides not to participate, our obligation of protection of privacy and the nature of sharing information with outside agencies.   Subject was given photocopy of fully executed consent  My contact information was given to patient.  Informed Consent is attached to this encounter in Media

## 2020-03-13 NOTE — PLAN OF CARE
Problem: Adult Inpatient Plan of Care  Goal: Plan of Care Review  Outcome: Ongoing, Not Progressing     VSS. Afebrile throughout the shift. AAOx4. Moves all extremities bilaterally and follows all commands. Jiménez catheter with clear yellow urine  cc/hr. Chest tube drainage serosanguineous and output 10cc. Pain controlled with PRN pain medications. Will continue to monitor. See flowsheets for assessments.

## 2020-03-13 NOTE — CARE UPDATE
BG goal 110-140    Remains in ICU, NAEON. 3 Days Post-Op. Diet Cardiac. BG ne;pw goal without insulin.   Plan:  Discontinue BG monitoring.   No history of DM. No futher insulin/endocrine needs, will sign off.    Thank you for the consult. Please call/ re-consult if needed.

## 2020-03-13 NOTE — PROGRESS NOTES
Patient admitted to CSU. Patient arrived to floor from sicu no evidence of distress; patient AAO x4 at this time. Patient placed on tele. Patient voices no complaints at this time. Plan of care initiated with patient. Bed in lowest position, locked, SR up x2, call bell in reach. Will continue to monitor patient.

## 2020-03-13 NOTE — ASSESSMENT & PLAN NOTE
Michelle Hodges is a 49 y.o. female presents to SICU s/p RE-REPLACEMENT, AORTIC VALVE with a small LivaNova Perceval pericardial prosthesis. ECHO revealed adequate AV placement without AV or AS. Off pressors. Plan to step-down today.     Neuro:  Sedation: None  Pain control: Scheduled tylenol, oxy  Alert and oriented, follows commands    Resp:  6L NC, wean as tolerated  Hx on home O2  Monitor CT outputs, to suction  CT output 1, R 80  CT output 2, L 85  Daily  CXR    CV:  Off levo, epi, vaso   HDS not on pressors   ECHO 3/11 revealed adequate AV placement, no AI or AS  MAP 60-80    Heme/ID:  H/H 8.1 (8.1) / 26.2  Periop Ancef completed   HIT panel send out serotonin assay, will follow-up    Renal:  Jiménez in place  UOP 2058, net -1.1 L   UOP overnight >30cc  Strict I/Os  BUN Cr, today 7/0.6      FEN/GI:  Cardiac diet   Passed swallow study, PO meds  Replace lytes PRN    Endo:  Accuchecks  BG goal <180    PPX:  Protonix  Sub Q Hep    Dispo: De-line and step-down today

## 2020-03-13 NOTE — PLAN OF CARE
AAOx4 afebrile. Follows commands. Moves all extremities. MAP maintained 60-80s. SpO2 >95 on 5L NC. 1L PRBC. Tolerated sitting up in the chair for 8 hours without complication. Chest tube output 10-20ml/hour,serosanguinous. Pt. And family updated on plan of care.       SKIN NOTE: Bed plugged in mattress inflated. No skin break down on heels or sacrum. Foam pads CDI on heels and sacrum.

## 2020-03-13 NOTE — PLAN OF CARE
Problem: Physical Therapy Goal  Goal: Physical Therapy Goal  Description  Goals to be met by: 3/25/20    Patient will increase functional independence with mobility by performin. Supine to sit with Contact Guard Assistance -not met  2. Sit to stand transfer with Contact Guard Assistance (Met 3/13/2020)   5. Revised: Sit to stand transfer with SBA and no AD  3. Gait  x 150 feet with Contact Guard Assistance  -not met  4. Lower extremity exercise program x15 reps  with supervision -not met       3/13/2020 1135 by Dominick Mcconnell, MOHAN  Outcome: Ongoing, Progressing    Goals reviewed, updated, and still appropriate.    Dominick Mcconnell, MOHAN  3/13/2020

## 2020-03-13 NOTE — SUBJECTIVE & OBJECTIVE
Interval History/Significant Events: NAEO, VSS. UOP 2058, net fluid balance -1.1L. Pain is well managed on PO meds. CVP 9-13. H/H stable 8.1/26.2. Up to chair yesterday. SpO2 >95 on 5L NC.     Follow-up For: Procedure(s) (LRB):  REPLACEMENT, AORTIC VALVE, WITH REPEAT STERNOTOMY (N/A)    Post-Operative Day: 3 Days Post-Op    Objective:     Vital Signs (Most Recent):  Temp: 99 °F (37.2 °C) (03/12/20 2300)  Pulse: 97 (03/13/20 0600)  Resp: (!) 34 (03/13/20 0600)  BP: (!) 116/58 (03/12/20 2100)  SpO2: (!) 88 % (03/13/20 0600) Vital Signs (24h Range):  Temp:  [98.6 °F (37 °C)-99.2 °F (37.3 °C)] 99 °F (37.2 °C)  Pulse:  [] 97  Resp:  [13-61] 34  SpO2:  [88 %-100 %] 88 %  BP: ()/(46-68) 116/58  Arterial Line BP: (104-156)/(42-98) 127/68     Weight: 82.5 kg (181 lb 14.1 oz)  Body mass index is 33.27 kg/m².      Intake/Output Summary (Last 24 hours) at 3/13/2020 0655  Last data filed at 3/13/2020 0400  Gross per 24 hour   Intake 1085.2 ml   Output 2323 ml   Net -1237.8 ml       Physical Exam   Constitutional: She is oriented to person, place, and time. She appears well-developed and well-nourished.   Cardiovascular: Normal rate and intact distal pulses.   Off pressors   Pulmonary/Chest: Effort normal. No respiratory distress.   5L NC. CT 1, R 80, to suction. CT 2, L 92, to suction.    Abdominal: Soft. She exhibits no distension.   Neurological: She is alert and oriented to person, place, and time.   Skin: Skin is warm and dry.   Nursing note and vitals reviewed.      Vents:  Oxygen Concentration (%): 40 (03/11/20 2030)    Lines/Drains/Airways     Central Venous Catheter Line             Introducer with Double Lumen 03/10/20 right internal jugular 3 days          Drain                 Urethral Catheter 03/10/20 0745 Non-latex;Straight-tip 16 Fr. 2 days         Y Chest Tube 1 and 2 03/10/20 1401 1 Anterior Mediastinal 19 Fr. 2 Anterior Mediastinal 19 Fr. 2 days         Y Chest Tube 3 and 4 03/10/20 1403 3 Right  Pleural 19 Fr. 4 Left Pleural 19 Fr. 2 days          Arterial Line                 Arterial Line 03/10/20 0715 Left Radial 2 days          Line                 Pacer Wires 03/10/20 1401 2 days                Significant Labs:    CBC/Anemia Profile:  Recent Labs   Lab 03/12/20  0345 03/12/20  1108 03/13/20  0340   WBC 5.56 7.63 5.97   HGB 6.7* 8.1* 8.1*   HCT 20.8* 25.4* 26.2*   PLT 68* 56* 37*   MCV 97 95 95   RDW 16.2* 15.6* 15.6*        Chemistries:  Recent Labs   Lab 03/11/20  2200 03/12/20  0345 03/12/20  1711 03/13/20  0340   * 133*  --  139   K 3.8 4.3 3.9 3.8    103  --  106   CO2 23 25  --  25   BUN 11 9  --  7   CREATININE 0.7 0.7  --  0.6   CALCIUM 7.7* 7.7*  --  8.1*   ALBUMIN  --  3.5  --  3.2*   PROT  --  5.2*  --  5.3*   BILITOT  --  1.4*  --  0.7   ALKPHOS  --  31*  --  50*   ALT  --  32  --  53*   AST  --  54*  --  54*   MG 2.4 2.4 2.1 2.2   PHOS 2.5* 2.6*  --  2.1*         Significant Imaging:  I have reviewed all pertinent imaging results/findings within the past 24 hours.

## 2020-03-13 NOTE — PROGRESS NOTES
[facility]  Critical Care - Surgery  Progress Note    Patient Name: Michelle Hodges  MRN: [id number]  Admission Date: 3/10/2020  Hospital Length of Stay: 3 days  Code Status: Full Code  Attending Provider: Jack Jara MD  Primary Care Provider: Prem Giron Jr, MD   Principal Problem: S/P AVR (aortic valve replacement)    Subjective:     Interval History/Significant Events: NAEO, VSS. UOP 2058, net fluid balance -1.1L. Pain is well managed on PO meds. CVP 9-13. H/H stable 8.1/26.2. Up to chair yesterday. SpO2 >95 on 5L NC.     Follow-up For: Procedure(s) (LRB):  REPLACEMENT, AORTIC VALVE, WITH REPEAT STERNOTOMY (N/A)    Post-Operative Day: 3 Days Post-Op    Objective:     Vital Signs (Most Recent):  Temp: 99 °F (37.2 °C) (03/12/20 2300)  Pulse: 97 (03/13/20 0600)  Resp: (!) 34 (03/13/20 0600)  BP: (!) 116/58 (03/12/20 2100)  SpO2: (!) 88 % (03/13/20 0600) Vital Signs (24h Range):  Temp:  [98.6 °F (37 °C)-99.2 °F (37.3 °C)] 99 °F (37.2 °C)  Pulse:  [] 97  Resp:  [13-61] 34  SpO2:  [88 %-100 %] 88 %  BP: ()/(46-68) 116/58  Arterial Line BP: (104-156)/(42-98) 127/68     Weight: 82.5 kg (181 lb 14.1 oz)  Body mass index is 33.27 kg/m².      Intake/Output Summary (Last 24 hours) at 3/13/2020 0655  Last data filed at 3/13/2020 0400  Gross per 24 hour   Intake 1085.2 ml   Output 2323 ml   Net -1237.8 ml       Physical Exam   Constitutional: She is oriented to person, place, and time. She appears well-developed and well-nourished.   Cardiovascular: Normal rate and intact distal pulses.   Off pressors   Pulmonary/Chest: Effort normal. No respiratory distress.   5L NC. CT 1, R 80, to suction. CT 2, L 92, to suction.    Abdominal: Soft. She exhibits no distension.   Neurological: She is alert and oriented to person, place, and time.   Skin: Skin is warm and dry.   Nursing note and vitals reviewed.      Vents:  Oxygen Concentration (%): 40 (03/11/20 2030)    Lines/Drains/Airways      Central Venous Catheter Line             Introducer with Double Lumen 03/10/20 right internal jugular 3 days          Drain                 Urethral Catheter 03/10/20 0745 Non-latex;Straight-tip 16 Fr. 2 days         Y Chest Tube 1 and 2 03/10/20 1401 1 Anterior Mediastinal 19 Fr. 2 Anterior Mediastinal 19 Fr. 2 days         Y Chest Tube 3 and 4 03/10/20 1403 3 Right Pleural 19 Fr. 4 Left Pleural 19 Fr. 2 days          Arterial Line                 Arterial Line 03/10/20 0715 Left Radial 2 days          Line                 Pacer Wires 03/10/20 1401 2 days                Significant Labs:    CBC/Anemia Profile:  Recent Labs   Lab 03/12/20  0345 03/12/20  1108 03/13/20  0340   WBC 5.56 7.63 5.97   HGB 6.7* 8.1* 8.1*   HCT 20.8* 25.4* 26.2*   PLT 68* 56* 37*   MCV 97 95 95   RDW 16.2* 15.6* 15.6*        Chemistries:  Recent Labs   Lab 03/11/20  2200 03/12/20  0345 03/12/20  1711 03/13/20  0340   * 133*  --  139   K 3.8 4.3 3.9 3.8    103  --  106   CO2 23 25  --  25   BUN 11 9  --  7   CREATININE 0.7 0.7  --  0.6   CALCIUM 7.7* 7.7*  --  8.1*   ALBUMIN  --  3.5  --  3.2*   PROT  --  5.2*  --  5.3*   BILITOT  --  1.4*  --  0.7   ALKPHOS  --  31*  --  50*   ALT  --  32  --  53*   AST  --  54*  --  54*   MG 2.4 2.4 2.1 2.2   PHOS 2.5* 2.6*  --  2.1*         Significant Imaging:  I have reviewed all pertinent imaging results/findings within the past 24 hours.    Assessment/Plan:     * S/P AVR (aortic valve replacement)  Michelle Hodges is a 49 y.o. female presents to SICU s/p RE-REPLACEMENT, AORTIC VALVE with a small LivaNova Perceval pericardial prosthesis. ECHO revealed adequate AV placement without AV or AS. Off pressors. Plan to step-down today.     Neuro:  Sedation: None  Pain control: Scheduled tylenol, oxy  Alert and oriented, follows commands    Resp:  6L NC, wean as tolerated  Hx on home O2  Monitor CT outputs, to suction  CT output 1, R 80  CT output 2, L 85  Daily  CXR    CV:  Off levo, epi, vaso    HDS not on pressors   ECHO 3/11 revealed adequate AV placement, no AI or AS  MAP 60-80    Heme/ID:  H/H 8.1 (8.1) / 26.2  Periop Ancef completed   HIT panel send out serotonin assay, will follow-up    Renal:  Jiménez in place  UOP 2058, net -1.1 L   UOP overnight >30cc  Strict I/Os  BUN Cr, today 7/0.6      FEN/GI:  Cardiac diet   Passed swallow study, PO meds  Replace lytes PRN    Endo:  Accuchecks  BG goal <180    PPX:  Protonix  Sub Q Hep    Dispo: De-line and step-down today        Critical care was time spent personally by me on the following activities: development of treatment plan with patient or surrogate and bedside caregivers, discussions with consultants, evaluation of patient's response to treatment, examination of patient, ordering and performing treatments and interventions, ordering and review of laboratory studies, ordering and review of radiographic studies, pulse oximetry, re-evaluation of patient's condition.  This critical care time did not overlap with that of any other provider or involve time for any procedures.     Dee Doe MD  Critical Care - Surgery  [facility]

## 2020-03-13 NOTE — PT/OT/SLP PROGRESS
Occupational Therapy   Treatment    Name: Michelle Hodges  MRN: [id number]  Admitting Diagnosis:  S/P AVR (aortic valve replacement)  3 Days Post-Op    Recommendations:     Discharge Recommendations: home health OT  Discharge Equipment Recommendations:  none  Barriers to discharge:  None    Assessment:     Michelle Hodges is a 49 y.o. female with a medical diagnosis of S/P AVR (aortic valve replacement).  She presents with improving overall activity tolerance. Performance deficits affecting function are weakness, impaired self care skills, impaired balance, impaired functional mobilty, impaired endurance, decreased upper extremity function, gait instability, impaired cardiopulmonary response to activity.     Rehab Prognosis:  Good; patient would benefit from acute skilled OT services to address these deficits and reach maximum level of function.       Plan:     Patient to be seen 5 x/week to address the above listed problems via self-care/home management, therapeutic activities, therapeutic exercises  · Plan of Care Expires: 04/10/20  · Plan of Care Reviewed with: patient    Subjective     Pain/Comfort:  · Pain Rating 1: 8/10  · Location - Side 1: Bilateral  · Location - Orientation 1: midline  · Location 1: chest  · Pain Addressed 1: Reposition, Distraction, Pre-medicate for activity  · Pain Rating Post-Intervention 1: 8/10    Objective:     Communicated with: RN prior to session.  Patient found up in chair with blood pressure cuff, pulse ox (continuous), telemetry, oxygen, arterial line, central line, chest tube, nunez catheter upon OT entry to room.    General Precautions: Standard, fall, sternal   Orthopedic Precautions:    Braces:       Occupational Performance:     Bed Mobility:    · NT     Functional Mobility/Transfers:  · Patient completed Sit <> Stand Transfer with contact guard assistance  with  no assistive device   · Functional Mobility: CGA to/from bathroom     Activities of Daily Living:  · Grooming:  independence    · Upper Body Dressing: contact guard assistance    · Lower Body Dressing: contact guard assistance        AMPA 6 Click ADL: 20    Treatment & Education:  Pt ed on OT POC  Sternal precautions reviewed  Pt stood at sink x 4 min with SBA while engaged in self-care tasks  Pt re-ed on ROM Ex's 10x 3x daily for increased overall strength and endurance    Patient left up in chair with all lines intact, call button in reach and RN notifiedEducation:      GOALS:   Multidisciplinary Problems     Occupational Therapy Goals        Problem: Occupational Therapy Goal    Goal Priority Disciplines Outcome Interventions   Occupational Therapy Goal     OT, PT/OT Ongoing, Progressing    Description:  Goals to be met by: 3/21/20     Patient will increase functional independence with ADLs by performing:    UE Dressing with Supervision.  LE Dressing with Stand-by Assistance.  Grooming while standing at sink with Stand-by Assistance.  Toileting from toilet with Stand-by Assistance for hygiene and clothing management.   Toilet transfer to toilet with Stand-by Assistance.                      Time Tracking:     OT Date of Treatment: 03/13/20  OT Start Time: 0819  OT Stop Time: 0833  OT Total Time (min): 14 min    Billable Minutes:Self Care/Home Management 14    CHERELLE Martel  3/13/2020

## 2020-03-13 NOTE — PLAN OF CARE
Plan of Care Note  Cardiothoracic Surgery    Michelle Hodges is a 49 y.o. female with aortic stenosis after mechanical AVR in 2012 underwent tissue AVR (Perceval) (3/10/20).    Specific issues: metoprolol 25 bid;  Lasix 20 bid; d/c CT meds; start home nebs; stepdown; f/u HIT panel    Plan of care for patient was discussed with ICU staff including nurses, residents, and faculty and appropriate consulting services.    Will continue to monitor patient's hemodynamics, functionality, neuro status, fluid status and renal function, and labs and will adjust medications and fluids as necessary while monitoring appropriateness for de-escalation of support and monitoring and transport to stepdown unit.    Robert Bond MD  Cardiothoracic Surgery Fellow

## 2020-03-13 NOTE — PLAN OF CARE
Problem: Occupational Therapy Goal  Goal: Occupational Therapy Goal  Description  Goals to be met by: 3/21/20     Patient will increase functional independence with ADLs by performing:    UE Dressing with Supervision.  LE Dressing with Stand-by Assistance.  Grooming while standing at sink with Stand-by Assistance.  Toileting from toilet with Stand-by Assistance for hygiene and clothing management.   Toilet transfer to toilet with Stand-by Assistance.     Outcome: Ongoing, Progressing   The above goals remain appropriate. CHERELLE Martel  3/13/2020

## 2020-03-13 NOTE — NURSING TRANSFER
Nursing Transfer Note      3/13/2020     Transfer To: CTSU [id number] from SICU [id number]     Transfer via bed    Transfer with  to O2, cardiac monitoring    Transported by MARY Nguyen RN    Medicines sent: yes    Chart send with patient: Yes    Notified: spouse    Patient reassessed at: 03/13/2020 @ 1100    Upon arrival to floor: cardiac monitor applied, patient oriented to room, call bell in reach and bed in lowest position

## 2020-03-13 NOTE — PT/OT/SLP PROGRESS
Physical Therapy Treatment    Patient Name:  Michelle Hodges   MRN:  [id number]    Recommendations:     Discharge Recommendations:  home health PT   Discharge Equipment Recommendations: (TBD)   Barriers to discharge: None    Assessment:     Michelle Hodges is a 49 y.o. female admitted with a medical diagnosis of S/P AVR (aortic valve replacement).  She presents with the following impairments/functional limitations:  weakness, impaired self care skills, impaired balance, impaired functional mobilty, impaired endurance, decreased lower extremity function, gait instability, impaired cardiopulmonary response to activity. Pt tolerated treatment well, which consisted of transfers and amb in room to bathroom. Pt will continue to need skilled PT services in order to increase her strength, endurance, balance, and overall gait quality to enable the pt to reach the highest level of functional indep. Upon discharge, pt is recommended to seek HHPT and DME will need to be assessed closer to discharge date.    Rehab Prognosis: Good; patient would benefit from acute skilled PT services to address these deficits and reach maximum level of function.    Recent Surgery: Procedure(s) (LRB):  REPLACEMENT, AORTIC VALVE, WITH REPEAT STERNOTOMY (N/A) 3 Days Post-Op    Plan:     During this hospitalization, patient to be seen 5 x/week to address the identified rehab impairments via gait training, therapeutic activities, therapeutic exercises and progress toward the following goals:    · Plan of Care Expires:  04/09/20    Subjective     Chief Complaint: chest pain  Patient/Family Comments/goals: to go home  Pain/Comfort:  · Pain Rating 1: 8/10  · Location - Side 1: Left  · Location - Orientation 1: upper  · Location 1: chest  · Pain Rating Post-Intervention 1: 8/10      Objective:     Communicated with nurse prior to session.  Patient found up in chair with central line, telemetry, oxygen, chest tube, pulse ox (continuous), nunez catheter,  peripheral IV, arterial line, blood pressure cuff(pt on 6L of oxygen with 2 chest tubes) upon PT entry to room.     General Precautions: Standard, fall, sternal   Orthopedic Precautions:N/A   Braces: N/A     Functional Mobility:  · Transfers:     · Sit <-> Stand:  contact guard assistance with no AD and PT's hand under pt's armpit for safety. Pt did not need any VC's to comply with sternal percautions. 6L of oxygen intact during session.  · Gait: amb ~32' to and from chair to bathroom with CGA with PT's hand under armpit and no AD. Pt demonstrated decreased gait speed and step-through gait pattern.  · Balance:   · Sitting: fair  · Standing: fair as pt performed ADLs with OT at sink.      AM-PAC 6 CLICK MOBILITY  Turning over in bed (including adjusting bedclothes, sheets and blankets)?: 2(taken from PT follow up)  Sitting down on and standing up from a chair with arms (e.g., wheelchair, bedside commode, etc.): 3  Moving from lying on back to sitting on the side of the bed?: 2(taken from PT follow up)  Moving to and from a bed to a chair (including a wheelchair)?: 3  Need to walk in hospital room?: 3  Climbing 3-5 steps with a railing?: 2  Basic Mobility Total Score: 15       Therapeutic Activities and Exercises:  Discussed POC with pt and she gave verbal understanding of such.    Patient left up in chair with all lines intact and call button in reach.    GOALS:   Multidisciplinary Problems     Physical Therapy Goals        Problem: Physical Therapy Goal    Goal Priority Disciplines Outcome Goal Variances Interventions   Physical Therapy Goal     PT, PT/OT Ongoing, Progressing     Description:  Goals to be met by: 3/25/20    Patient will increase functional independence with mobility by performin. Supine to sit with Contact Guard Assistance -not met  2. Sit to stand transfer with Contact Guard Assistance (Met 3/13/2020)  3. Gait  x 150 feet with Contact Guard Assistance  -not met  4. Lower extremity exercise  program x15 reps  with supervision -not met                       Time Tracking:     PT Received On: 03/13/20  PT Start Time: 0818     PT Stop Time: 0837  PT Total Time (min): 19 min     Billable Minutes: Gait Training 19    Treatment Type: Treatment  PT/PTA: PT     PTA Visit Number: 0     Dominick Mcconnell, [facility]  03/13/2020

## 2020-03-14 LAB
ALBUMIN SERPL BCP-MCNC: 3.3 G/DL (ref 3.5–5.2)
ALBUMIN SERPL BCP-MCNC: 3.5 G/DL (ref 3.5–5.2)
ALP SERPL-CCNC: 88 U/L (ref 55–135)
ALP SERPL-CCNC: 97 U/L (ref 55–135)
ALT SERPL W/O P-5'-P-CCNC: 227 U/L (ref 10–44)
ALT SERPL W/O P-5'-P-CCNC: 330 U/L (ref 10–44)
ANION GAP SERPL CALC-SCNC: 11 MMOL/L (ref 8–16)
ANION GAP SERPL CALC-SCNC: 9 MMOL/L (ref 8–16)
APTT BLDCRRT: 26.4 SEC (ref 21–32)
ASCENDING AORTA: 2.57 CM
AST SERPL-CCNC: 149 U/L (ref 10–40)
AST SERPL-CCNC: 424 U/L (ref 10–40)
AV INDEX (PROSTH): 0.48
AV MEAN GRADIENT: 13 MMHG
AV PEAK GRADIENT: 21 MMHG
AV VALVE AREA: 1.81 CM2
AV VELOCITY RATIO: 0.5
BASOPHILS # BLD AUTO: 0.03 K/UL (ref 0–0.2)
BASOPHILS NFR BLD: 0.5 % (ref 0–1.9)
BILIRUB SERPL-MCNC: 0.7 MG/DL (ref 0.1–1)
BILIRUB SERPL-MCNC: 0.8 MG/DL (ref 0.1–1)
BLD PROD TYP BPU: NORMAL
BLOOD UNIT EXPIRATION DATE: NORMAL
BLOOD UNIT TYPE CODE: 6200
BLOOD UNIT TYPE: NORMAL
BSA FOR ECHO PROCEDURE: 1.86 M2
BUN SERPL-MCNC: 10 MG/DL (ref 6–20)
BUN SERPL-MCNC: 12 MG/DL (ref 6–20)
CALCIUM SERPL-MCNC: 8.6 MG/DL (ref 8.7–10.5)
CALCIUM SERPL-MCNC: 8.9 MG/DL (ref 8.7–10.5)
CHLORIDE SERPL-SCNC: 101 MMOL/L (ref 95–110)
CHLORIDE SERPL-SCNC: 104 MMOL/L (ref 95–110)
CO2 SERPL-SCNC: 25 MMOL/L (ref 23–29)
CO2 SERPL-SCNC: 29 MMOL/L (ref 23–29)
CODING SYSTEM: NORMAL
CREAT SERPL-MCNC: 0.7 MG/DL (ref 0.5–1.4)
CREAT SERPL-MCNC: 0.7 MG/DL (ref 0.5–1.4)
CV ECHO LV RWT: 0.45 CM
DIFFERENTIAL METHOD: ABNORMAL
DISPENSE STATUS: NORMAL
DOP CALC AO PEAK VEL: 2.28 M/S
DOP CALC AO VTI: 42.09 CM
DOP CALC LVOT AREA: 3.8 CM2
DOP CALC LVOT DIAMETER: 2.19 CM
DOP CALC LVOT PEAK VEL: 1.14 M/S
DOP CALC LVOT STROKE VOLUME: 76.16 CM3
DOP CALCLVOT PEAK VEL VTI: 20.23 CM
E WAVE DECELERATION TIME: 147.62 MSEC
E/A RATIO: 1.37
E/E' RATIO: 16.12 M/S
ECHO LV POSTERIOR WALL: 0.83 CM (ref 0.6–1.1)
EOSINOPHIL # BLD AUTO: 0.2 K/UL (ref 0–0.5)
EOSINOPHIL NFR BLD: 3.7 % (ref 0–8)
ERYTHROCYTE [DISTWIDTH] IN BLOOD BY AUTOMATED COUNT: 15.9 % (ref 11.5–14.5)
EST. GFR  (AFRICAN AMERICAN): >60 ML/MIN/1.73 M^2
EST. GFR  (AFRICAN AMERICAN): >60 ML/MIN/1.73 M^2
EST. GFR  (NON AFRICAN AMERICAN): >60 ML/MIN/1.73 M^2
EST. GFR  (NON AFRICAN AMERICAN): >60 ML/MIN/1.73 M^2
FRACTIONAL SHORTENING: 33 % (ref 28–44)
GLUCOSE SERPL-MCNC: 64 MG/DL (ref 70–110)
GLUCOSE SERPL-MCNC: 98 MG/DL (ref 70–110)
HCT VFR BLD AUTO: 29.4 % (ref 37–48.5)
HGB BLD-MCNC: 9.2 G/DL (ref 12–16)
IMM GRANULOCYTES # BLD AUTO: 0.02 K/UL (ref 0–0.04)
IMM GRANULOCYTES NFR BLD AUTO: 0.3 % (ref 0–0.5)
INR PPP: 1 (ref 0.8–1.2)
INTERVENTRICULAR SEPTUM: 0.76 CM (ref 0.6–1.1)
IVRT: 82.78 MSEC
LA MAJOR: 5.23 CM
LA MINOR: 5.18 CM
LA WIDTH: 4.08 CM
LEFT ATRIUM SIZE: 3.26 CM
LEFT ATRIUM VOLUME INDEX: 32.6 ML/M2
LEFT ATRIUM VOLUME: 58.84 CM3
LEFT INTERNAL DIMENSION IN SYSTOLE: 2.45 CM (ref 2.1–4)
LEFT VENTRICLE DIASTOLIC VOLUME INDEX: 31.74 ML/M2
LEFT VENTRICLE DIASTOLIC VOLUME: 57.33 ML
LEFT VENTRICLE MASS INDEX: 45 G/M2
LEFT VENTRICLE SYSTOLIC VOLUME INDEX: 11.8 ML/M2
LEFT VENTRICLE SYSTOLIC VOLUME: 21.28 ML
LEFT VENTRICULAR INTERNAL DIMENSION IN DIASTOLE: 3.68 CM (ref 3.5–6)
LEFT VENTRICULAR MASS: 80.91 G
LV LATERAL E/E' RATIO: 12.45 M/S
LV SEPTAL E/E' RATIO: 22.83 M/S
LYMPHOCYTES # BLD AUTO: 1.2 K/UL (ref 1–4.8)
LYMPHOCYTES NFR BLD: 20.1 % (ref 18–48)
MAGNESIUM SERPL-MCNC: 1.9 MG/DL (ref 1.6–2.6)
MCH RBC QN AUTO: 30.3 PG (ref 27–31)
MCHC RBC AUTO-ENTMCNC: 31.3 G/DL (ref 32–36)
MCV RBC AUTO: 97 FL (ref 82–98)
MONOCYTES # BLD AUTO: 0.6 K/UL (ref 0.3–1)
MONOCYTES NFR BLD: 9.5 % (ref 4–15)
MV PEAK A VEL: 1 M/S
MV PEAK E VEL: 1.37 M/S
NEUTROPHILS # BLD AUTO: 3.9 K/UL (ref 1.8–7.7)
NEUTROPHILS NFR BLD: 65.9 % (ref 38–73)
NRBC BLD-RTO: 0 /100 WBC
PHOSPHATE SERPL-MCNC: 1.8 MG/DL (ref 2.7–4.5)
PISA TR MAX VEL: 3.28 M/S
PLATELET # BLD AUTO: 41 K/UL (ref 150–350)
PMV BLD AUTO: 12.5 FL (ref 9.2–12.9)
POTASSIUM SERPL-SCNC: 3.8 MMOL/L (ref 3.5–5.1)
POTASSIUM SERPL-SCNC: 4.1 MMOL/L (ref 3.5–5.1)
PROT SERPL-MCNC: 6 G/DL (ref 6–8.4)
PROT SERPL-MCNC: 6.1 G/DL (ref 6–8.4)
PROTHROMBIN TIME: 10.6 SEC (ref 9–12.5)
RA MAJOR: 4.35 CM
RA PRESSURE: 8 MMHG
RA WIDTH: 3.45 CM
RBC # BLD AUTO: 3.04 M/UL (ref 4–5.4)
RIGHT VENTRICULAR END-DIASTOLIC DIMENSION: 3.49 CM
RV TISSUE DOPPLER FREE WALL SYSTOLIC VELOCITY 1 (APICAL 4 CHAMBER VIEW): 4.13 CM/S
SINUS: 2.22 CM
SODIUM SERPL-SCNC: 139 MMOL/L (ref 136–145)
SODIUM SERPL-SCNC: 140 MMOL/L (ref 136–145)
STJ: 1.89 CM
TDI LATERAL: 0.11 M/S
TDI SEPTAL: 0.06 M/S
TDI: 0.09 M/S
TR MAX PG: 43 MMHG
TRANS ERYTHROCYTES VOL PATIENT: NORMAL ML
TRICUSPID ANNULAR PLANE SYSTOLIC EXCURSION: 0.67 CM
TV REST PULMONARY ARTERY PRESSURE: 51 MMHG
WBC # BLD AUTO: 5.88 K/UL (ref 3.9–12.7)

## 2020-03-14 PROCEDURE — 99900035 HC TECH TIME PER 15 MIN (STAT)

## 2020-03-14 PROCEDURE — 85610 PROTHROMBIN TIME: CPT

## 2020-03-14 PROCEDURE — 20600001 HC STEP DOWN PRIVATE ROOM

## 2020-03-14 PROCEDURE — S4991 NICOTINE PATCH NONLEGEND: HCPCS | Performed by: STUDENT IN AN ORGANIZED HEALTH CARE EDUCATION/TRAINING PROGRAM

## 2020-03-14 PROCEDURE — 36415 COLL VENOUS BLD VENIPUNCTURE: CPT

## 2020-03-14 PROCEDURE — 27000221 HC OXYGEN, UP TO 24 HOURS

## 2020-03-14 PROCEDURE — 80053 COMPREHEN METABOLIC PANEL: CPT | Mod: 91

## 2020-03-14 PROCEDURE — 27000646 HC AEROBIKA DEVICE

## 2020-03-14 PROCEDURE — 63600175 PHARM REV CODE 636 W HCPCS: Performed by: STUDENT IN AN ORGANIZED HEALTH CARE EDUCATION/TRAINING PROGRAM

## 2020-03-14 PROCEDURE — 25000242 PHARM REV CODE 250 ALT 637 W/ HCPCS: Performed by: SURGERY

## 2020-03-14 PROCEDURE — 85025 COMPLETE CBC W/AUTO DIFF WBC: CPT

## 2020-03-14 PROCEDURE — 83735 ASSAY OF MAGNESIUM: CPT

## 2020-03-14 PROCEDURE — 25000003 PHARM REV CODE 250: Performed by: SURGERY

## 2020-03-14 PROCEDURE — 63600175 PHARM REV CODE 636 W HCPCS: Performed by: SURGERY

## 2020-03-14 PROCEDURE — 94664 DEMO&/EVAL PT USE INHALER: CPT

## 2020-03-14 PROCEDURE — 80053 COMPREHEN METABOLIC PANEL: CPT

## 2020-03-14 PROCEDURE — 25000003 PHARM REV CODE 250: Performed by: STUDENT IN AN ORGANIZED HEALTH CARE EDUCATION/TRAINING PROGRAM

## 2020-03-14 PROCEDURE — 85730 THROMBOPLASTIN TIME PARTIAL: CPT

## 2020-03-14 PROCEDURE — 84100 ASSAY OF PHOSPHORUS: CPT

## 2020-03-14 PROCEDURE — 94640 AIRWAY INHALATION TREATMENT: CPT

## 2020-03-14 RX ORDER — FUROSEMIDE 10 MG/ML
20 INJECTION INTRAMUSCULAR; INTRAVENOUS ONCE
Status: COMPLETED | OUTPATIENT
Start: 2020-03-14 | End: 2020-03-14

## 2020-03-14 RX ORDER — LEVALBUTEROL INHALATION SOLUTION 0.63 MG/3ML
0.63 SOLUTION RESPIRATORY (INHALATION) EVERY 8 HOURS
Status: DISCONTINUED | OUTPATIENT
Start: 2020-03-14 | End: 2020-03-16 | Stop reason: HOSPADM

## 2020-03-14 RX ORDER — LEVALBUTEROL INHALATION SOLUTION 0.63 MG/3ML
0.63 SOLUTION RESPIRATORY (INHALATION) EVERY 6 HOURS
Status: DISCONTINUED | OUTPATIENT
Start: 2020-03-14 | End: 2020-03-14

## 2020-03-14 RX ORDER — LEVALBUTEROL INHALATION SOLUTION 0.63 MG/3ML
0.63 SOLUTION RESPIRATORY (INHALATION) EVERY 8 HOURS
Status: DISCONTINUED | OUTPATIENT
Start: 2020-03-14 | End: 2020-03-14

## 2020-03-14 RX ORDER — FUROSEMIDE 10 MG/ML
40 INJECTION INTRAMUSCULAR; INTRAVENOUS
Status: DISCONTINUED | OUTPATIENT
Start: 2020-03-14 | End: 2020-03-16 | Stop reason: HOSPADM

## 2020-03-14 RX ORDER — METOPROLOL TARTRATE 25 MG/1
12.5 TABLET ORAL ONCE
Status: COMPLETED | OUTPATIENT
Start: 2020-03-14 | End: 2020-03-14

## 2020-03-14 RX ADMIN — OXYCODONE HYDROCHLORIDE 10 MG: 10 TABLET ORAL at 08:03

## 2020-03-14 RX ADMIN — ACETAMINOPHEN 650 MG: 325 TABLET ORAL at 08:03

## 2020-03-14 RX ADMIN — OXYCODONE HYDROCHLORIDE 10 MG: 10 TABLET ORAL at 02:03

## 2020-03-14 RX ADMIN — ONDANSETRON 4 MG: 2 INJECTION INTRAMUSCULAR; INTRAVENOUS at 05:03

## 2020-03-14 RX ADMIN — METOPROLOL TARTRATE 25 MG: 25 TABLET ORAL at 08:03

## 2020-03-14 RX ADMIN — FUROSEMIDE 20 MG: 10 INJECTION, SOLUTION INTRAMUSCULAR; INTRAVENOUS at 08:03

## 2020-03-14 RX ADMIN — OXYCODONE HYDROCHLORIDE 10 MG: 10 TABLET ORAL at 12:03

## 2020-03-14 RX ADMIN — FUROSEMIDE 40 MG: 10 INJECTION, SOLUTION INTRAMUSCULAR; INTRAVENOUS at 09:03

## 2020-03-14 RX ADMIN — MUPIROCIN 1 G: 20 OINTMENT TOPICAL at 09:03

## 2020-03-14 RX ADMIN — FUROSEMIDE 20 MG: 10 INJECTION, SOLUTION INTRAMUSCULAR; INTRAVENOUS at 10:03

## 2020-03-14 RX ADMIN — MICONAZOLE NITRATE: 20 OINTMENT TOPICAL at 09:03

## 2020-03-14 RX ADMIN — OXYCODONE HYDROCHLORIDE 10 MG: 10 TABLET ORAL at 09:03

## 2020-03-14 RX ADMIN — PANTOPRAZOLE SODIUM 40 MG: 40 TABLET, DELAYED RELEASE ORAL at 08:03

## 2020-03-14 RX ADMIN — LEVALBUTEROL HYDROCHLORIDE 0.63 MG: 0.63 SOLUTION RESPIRATORY (INHALATION) at 12:03

## 2020-03-14 RX ADMIN — TRAZODONE HYDROCHLORIDE 50 MG: 50 TABLET ORAL at 11:03

## 2020-03-14 RX ADMIN — PROMETHAZINE HYDROCHLORIDE 12.5 MG: 25 INJECTION INTRAMUSCULAR; INTRAVENOUS at 08:03

## 2020-03-14 RX ADMIN — METOPROLOL TARTRATE 37.5 MG: 25 TABLET ORAL at 09:03

## 2020-03-14 RX ADMIN — OXYCODONE HYDROCHLORIDE 10 MG: 10 TABLET ORAL at 04:03

## 2020-03-14 RX ADMIN — ASPIRIN 325 MG ORAL TABLET 325 MG: 325 PILL ORAL at 08:03

## 2020-03-14 RX ADMIN — METOPROLOL TARTRATE 12.5 MG: 25 TABLET, FILM COATED ORAL at 02:03

## 2020-03-14 RX ADMIN — LEVALBUTEROL HYDROCHLORIDE 0.63 MG: 0.63 SOLUTION RESPIRATORY (INHALATION) at 08:03

## 2020-03-14 RX ADMIN — NICOTINE 1 PATCH: 7 PATCH, EXTENDED RELEASE TRANSDERMAL at 08:03

## 2020-03-14 RX ADMIN — CITALOPRAM HYDROBROMIDE 40 MG: 20 TABLET ORAL at 08:03

## 2020-03-14 RX ADMIN — ATORVASTATIN CALCIUM 10 MG: 10 TABLET, FILM COATED ORAL at 09:03

## 2020-03-14 NOTE — NURSING
Patient potassium back at 3.6. Dr. Martini notified. MD to order replacement. [facility].

## 2020-03-14 NOTE — NURSING
Report received and care assumed. Patient is lying in bed asleep, is easily arousable and is AAOX4 upon arousal. No change from previous assessment done at 2000 by CITLALY Galindo RN

## 2020-03-14 NOTE — PROGRESS NOTES
[facility]  Cardiothoracic Surgery  Progress Note    Patient Name: Michelle Hodges  MRN: [id number]  Admission Date: 3/10/2020  Hospital Length of Stay: 4 days  Code Status: Prior   Attending Physician: Jack Jara MD   Referring Provider: Jack Jara MD  Principal Problem:S/P AVR (aortic valve replacement)      Subjective:     Post-Op Info:  Procedure(s) (LRB):  REPLACEMENT, AORTIC VALVE, WITH REPEAT STERNOTOMY (N/A)   4 Days Post-Op     Interval History: pain mod controlled; on 5L O2 nasal cannula    ROS  Medications:  Continuous Infusions:  Scheduled Meds:   aspirin  325 mg Oral Daily    atorvastatin  10 mg Oral QHS    citalopram  40 mg Oral Daily    docusate sodium  100 mg Oral Daily    furosemide (LASIX) IV  40 mg Intravenous Q12H    levalbuterol  0.63 mg Nebulization Q8H    metoprolol tartrate  12.5 mg Oral Once    metoprolol tartrate  37.5 mg Oral BID    miconazole nitrate 2%   Topical (Top) BID    mupirocin  1 g Nasal BID    nicotine  1 patch Transdermal Daily    pantoprazole  40 mg Oral Daily    polyethylene glycol  17 g Oral Daily    traZODone  50 mg Oral QHS     PRN Meds:sodium chloride, sodium chloride, acetaminophen, bisacodyL, Dextrose 10% Bolus, Dextrose 10% Bolus, dextrose 50%, diphenhydrAMINE, guaiFENesin, ondansetron, ondansetron, oxyCODONE, oxyCODONE, promethazine (PHENERGAN) IVPB     Objective:     Vital Signs (Most Recent):  Temp: 98.1 °F (36.7 °C) (03/14/20 1203)  Pulse: 94 (03/14/20 1203)  Resp: 16 (03/14/20 1203)  BP: 125/83 (03/14/20 1203)  SpO2: 95 % (03/14/20 1203) Vital Signs (24h Range):  Temp:  [97.7 °F (36.5 °C)-99 °F (37.2 °C)] 98.1 °F (36.7 °C)  Pulse:  [] 94  Resp:  [16-22] 16  SpO2:  [91 %-97 %] 95 %  BP: ()/(60-92) 125/83     Weight: 79.5 kg (175 lb 4.3 oz)  Body mass index is 32.06 kg/m².    SpO2: 95 %  O2 Device (Oxygen Therapy): nasal cannula    Intake/Output - Last 3 Shifts       03/12 0700 - 03/13 0659 03/13 0700 -  03/14 0659 03/14 0700 - 03/15 0659    P.O. 240 700     I.V. (mL/kg) 593.2 (7.2)      Blood 252      IV Piggyback 100 250     Total Intake(mL/kg) 1185.2 (14.4) 950 (11.9)     Urine (mL/kg/hr) 2088 (1.1) 2025 (1.1)     Stool  0     Chest Tube 235 70     Total Output 2323 2095     Net -1137.8 -1145            Stool Occurrence  2 x           Lines/Drains/Airways     Line                 Pacer Wires 03/10/20 1401 3 days          Peripheral Intravenous Line                 Peripheral IV - Single Lumen Anterior;Left Upper Arm -- days                Physical Exam  Incision c/d/i; UOP: 1.9L; net -1L    Significant Labs:  BMP:   Recent Labs   Lab 03/14/20  0402   GLU 64*      K 4.1      CO2 25   BUN 10   CREATININE 0.7   CALCIUM 8.6*   MG 1.9     CBC:   Recent Labs   Lab 03/14/20  0402   WBC 5.88   RBC 3.04*   HGB 9.2*   HCT 29.4*   PLT 41*   MCV 97   MCH 30.3   MCHC 31.3*     Coagulation:   Recent Labs   Lab 03/14/20  0402   INR 1.0   APTT 26.4       Significant Diagnostics:  CXR: pulm edema    Assessment/Plan:     * S/P AVR (aortic valve replacement)  Michelle Hodges is a 49 y.o. female with aortic stenosis after mechanical AVR in 2012 underwent tissue AVR (Perceval) (3/10/20).  --continue asa, statin, metop and lasix  --increase metop to 37.5 and lasix to 40bid iv  --xopenex nebs  --encourage ambulation and wean O2 as tolerated        Robert Bond MD  Cardiothoracic Surgery  [facility]

## 2020-03-14 NOTE — PLAN OF CARE
Plan of care discussed with patient and spouse at the bedside.  Patient ambulates with assisstance to the bathroom. Fall precautions in place.Continuing to encourage sternal precautions, IS, and ambulation. Will continue to monitor platelet counts. Awaiting HIT results. Patient has no complaints of pain. Discussed medications and care. Patient has no questions at this time. Will continue to monitor.

## 2020-03-14 NOTE — ASSESSMENT & PLAN NOTE
Michelle Hodges is a 49 y.o. female with aortic stenosis after mechanical AVR in 2012 underwent tissue AVR (Perceval) (3/10/20).  --continue asa, statin, metop and lasix  --increase metop to 37.5 and lasix to 40bid iv  --xopenex nebs  --encourage ambulation and wean O2 as tolerated

## 2020-03-14 NOTE — SUBJECTIVE & OBJECTIVE
Interval History: pain mod controlled; on 5L O2 nasal cannula    ROS  Medications:  Continuous Infusions:  Scheduled Meds:   aspirin  325 mg Oral Daily    atorvastatin  10 mg Oral QHS    citalopram  40 mg Oral Daily    docusate sodium  100 mg Oral Daily    furosemide (LASIX) IV  40 mg Intravenous Q12H    levalbuterol  0.63 mg Nebulization Q8H    metoprolol tartrate  12.5 mg Oral Once    metoprolol tartrate  37.5 mg Oral BID    miconazole nitrate 2%   Topical (Top) BID    mupirocin  1 g Nasal BID    nicotine  1 patch Transdermal Daily    pantoprazole  40 mg Oral Daily    polyethylene glycol  17 g Oral Daily    traZODone  50 mg Oral QHS     PRN Meds:sodium chloride, sodium chloride, acetaminophen, bisacodyL, Dextrose 10% Bolus, Dextrose 10% Bolus, dextrose 50%, diphenhydrAMINE, guaiFENesin, ondansetron, ondansetron, oxyCODONE, oxyCODONE, promethazine (PHENERGAN) IVPB     Objective:     Vital Signs (Most Recent):  Temp: 98.1 °F (36.7 °C) (03/14/20 1203)  Pulse: 94 (03/14/20 1203)  Resp: 16 (03/14/20 1203)  BP: 125/83 (03/14/20 1203)  SpO2: 95 % (03/14/20 1203) Vital Signs (24h Range):  Temp:  [97.7 °F (36.5 °C)-99 °F (37.2 °C)] 98.1 °F (36.7 °C)  Pulse:  [] 94  Resp:  [16-22] 16  SpO2:  [91 %-97 %] 95 %  BP: ()/(60-92) 125/83     Weight: 79.5 kg (175 lb 4.3 oz)  Body mass index is 32.06 kg/m².    SpO2: 95 %  O2 Device (Oxygen Therapy): nasal cannula    Intake/Output - Last 3 Shifts       03/12 0700 - 03/13 0659 03/13 0700 - 03/14 0659 03/14 0700 - 03/15 0659    P.O. 240 700     I.V. (mL/kg) 593.2 (7.2)      Blood 252      IV Piggyback 100 250     Total Intake(mL/kg) 1185.2 (14.4) 950 (11.9)     Urine (mL/kg/hr) 2088 (1.1) 2025 (1.1)     Stool  0     Chest Tube 235 70     Total Output 2323 2095     Net -1137.8 -1145            Stool Occurrence  2 x           Lines/Drains/Airways     Line                 Pacer Wires 03/10/20 1401 3 days          Peripheral Intravenous Line                  Peripheral IV - Single Lumen Anterior;Left Upper Arm -- days                Physical Exam  Incision c/d/i; UOP: 1.9L; net -1L    Significant Labs:  BMP:   Recent Labs   Lab 03/14/20 0402   GLU 64*      K 4.1      CO2 25   BUN 10   CREATININE 0.7   CALCIUM 8.6*   MG 1.9     CBC:   Recent Labs   Lab 03/14/20 0402   WBC 5.88   RBC 3.04*   HGB 9.2*   HCT 29.4*   PLT 41*   MCV 97   MCH 30.3   MCHC 31.3*     Coagulation:   Recent Labs   Lab 03/14/20 0402   INR 1.0   APTT 26.4       Significant Diagnostics:  CXR: pulm edema

## 2020-03-14 NOTE — PLAN OF CARE
Plan of care discussed with patient.  Patient ambulating independently, fall precautions in place.Continuing to encourage sternal precautions, IS, and ambulation. Patient pain controled by PRN medications. Discussed medications and care. Being diuresed with 40 mg IVP lasix BID. Pacer wires isolated and secured. Patient has no questions at this time. Will continue to monitor.

## 2020-03-15 LAB
ALBUMIN SERPL BCP-MCNC: 3.2 G/DL (ref 3.5–5.2)
ALBUMIN SERPL BCP-MCNC: 3.2 G/DL (ref 3.5–5.2)
ALP SERPL-CCNC: 82 U/L (ref 55–135)
ALP SERPL-CCNC: 84 U/L (ref 55–135)
ALT SERPL W/O P-5'-P-CCNC: 169 U/L (ref 10–44)
ALT SERPL W/O P-5'-P-CCNC: 177 U/L (ref 10–44)
ANION GAP SERPL CALC-SCNC: 11 MMOL/L (ref 8–16)
ANION GAP SERPL CALC-SCNC: 11 MMOL/L (ref 8–16)
ANION GAP SERPL CALC-SCNC: 13 MMOL/L (ref 8–16)
APTT BLDCRRT: 24.4 SEC (ref 21–32)
AST SERPL-CCNC: 85 U/L (ref 10–40)
AST SERPL-CCNC: 97 U/L (ref 10–40)
BASOPHILS # BLD AUTO: 0.02 K/UL (ref 0–0.2)
BASOPHILS NFR BLD: 0.4 % (ref 0–1.9)
BILIRUB SERPL-MCNC: 0.6 MG/DL (ref 0.1–1)
BILIRUB SERPL-MCNC: 0.7 MG/DL (ref 0.1–1)
BUN SERPL-MCNC: 10 MG/DL (ref 6–20)
BUN SERPL-MCNC: 12 MG/DL (ref 6–20)
BUN SERPL-MCNC: 14 MG/DL (ref 6–20)
CALCIUM SERPL-MCNC: 8.4 MG/DL (ref 8.7–10.5)
CALCIUM SERPL-MCNC: 8.7 MG/DL (ref 8.7–10.5)
CALCIUM SERPL-MCNC: 9.1 MG/DL (ref 8.7–10.5)
CHLORIDE SERPL-SCNC: 101 MMOL/L (ref 95–110)
CHLORIDE SERPL-SCNC: 101 MMOL/L (ref 95–110)
CHLORIDE SERPL-SCNC: 98 MMOL/L (ref 95–110)
CO2 SERPL-SCNC: 27 MMOL/L (ref 23–29)
CO2 SERPL-SCNC: 27 MMOL/L (ref 23–29)
CO2 SERPL-SCNC: 32 MMOL/L (ref 23–29)
CREAT SERPL-MCNC: 0.7 MG/DL (ref 0.5–1.4)
DIFFERENTIAL METHOD: ABNORMAL
EOSINOPHIL # BLD AUTO: 0.3 K/UL (ref 0–0.5)
EOSINOPHIL NFR BLD: 5.4 % (ref 0–8)
ERYTHROCYTE [DISTWIDTH] IN BLOOD BY AUTOMATED COUNT: 15.6 % (ref 11.5–14.5)
EST. GFR  (AFRICAN AMERICAN): >60 ML/MIN/1.73 M^2
EST. GFR  (NON AFRICAN AMERICAN): >60 ML/MIN/1.73 M^2
GLUCOSE SERPL-MCNC: 103 MG/DL (ref 70–110)
GLUCOSE SERPL-MCNC: 121 MG/DL (ref 70–110)
GLUCOSE SERPL-MCNC: 88 MG/DL (ref 70–110)
HCT VFR BLD AUTO: 29.2 % (ref 37–48.5)
HGB BLD-MCNC: 9.1 G/DL (ref 12–16)
IMM GRANULOCYTES # BLD AUTO: 0.06 K/UL (ref 0–0.04)
IMM GRANULOCYTES NFR BLD AUTO: 1.1 % (ref 0–0.5)
INR PPP: 1 (ref 0.8–1.2)
LYMPHOCYTES # BLD AUTO: 1.4 K/UL (ref 1–4.8)
LYMPHOCYTES NFR BLD: 24.5 % (ref 18–48)
MAGNESIUM SERPL-MCNC: 1.7 MG/DL (ref 1.6–2.6)
MCH RBC QN AUTO: 29.3 PG (ref 27–31)
MCHC RBC AUTO-ENTMCNC: 31.2 G/DL (ref 32–36)
MCV RBC AUTO: 94 FL (ref 82–98)
MONOCYTES # BLD AUTO: 0.7 K/UL (ref 0.3–1)
MONOCYTES NFR BLD: 12.6 % (ref 4–15)
NEUTROPHILS # BLD AUTO: 3.2 K/UL (ref 1.8–7.7)
NEUTROPHILS NFR BLD: 56 % (ref 38–73)
NRBC BLD-RTO: 0 /100 WBC
PLATELET # BLD AUTO: 40 K/UL (ref 150–350)
PMV BLD AUTO: 11.5 FL (ref 9.2–12.9)
POTASSIUM SERPL-SCNC: 3.4 MMOL/L (ref 3.5–5.1)
POTASSIUM SERPL-SCNC: 3.5 MMOL/L (ref 3.5–5.1)
POTASSIUM SERPL-SCNC: 3.8 MMOL/L (ref 3.5–5.1)
PROT SERPL-MCNC: 6 G/DL (ref 6–8.4)
PROT SERPL-MCNC: 6.1 G/DL (ref 6–8.4)
PROTHROMBIN TIME: 10.5 SEC (ref 9–12.5)
RBC # BLD AUTO: 3.11 M/UL (ref 4–5.4)
SODIUM SERPL-SCNC: 139 MMOL/L (ref 136–145)
SODIUM SERPL-SCNC: 141 MMOL/L (ref 136–145)
SODIUM SERPL-SCNC: 141 MMOL/L (ref 136–145)
WBC # BLD AUTO: 5.71 K/UL (ref 3.9–12.7)

## 2020-03-15 PROCEDURE — 94761 N-INVAS EAR/PLS OXIMETRY MLT: CPT

## 2020-03-15 PROCEDURE — 83735 ASSAY OF MAGNESIUM: CPT

## 2020-03-15 PROCEDURE — 80048 BASIC METABOLIC PNL TOTAL CA: CPT

## 2020-03-15 PROCEDURE — 27000646 HC AEROBIKA DEVICE

## 2020-03-15 PROCEDURE — 25000003 PHARM REV CODE 250: Performed by: STUDENT IN AN ORGANIZED HEALTH CARE EDUCATION/TRAINING PROGRAM

## 2020-03-15 PROCEDURE — S4991 NICOTINE PATCH NONLEGEND: HCPCS | Performed by: STUDENT IN AN ORGANIZED HEALTH CARE EDUCATION/TRAINING PROGRAM

## 2020-03-15 PROCEDURE — 94640 AIRWAY INHALATION TREATMENT: CPT

## 2020-03-15 PROCEDURE — 63600175 PHARM REV CODE 636 W HCPCS: Performed by: STUDENT IN AN ORGANIZED HEALTH CARE EDUCATION/TRAINING PROGRAM

## 2020-03-15 PROCEDURE — 85025 COMPLETE CBC W/AUTO DIFF WBC: CPT

## 2020-03-15 PROCEDURE — 94799 UNLISTED PULMONARY SVC/PX: CPT

## 2020-03-15 PROCEDURE — 85610 PROTHROMBIN TIME: CPT

## 2020-03-15 PROCEDURE — 94664 DEMO&/EVAL PT USE INHALER: CPT

## 2020-03-15 PROCEDURE — 36415 COLL VENOUS BLD VENIPUNCTURE: CPT

## 2020-03-15 PROCEDURE — 99900035 HC TECH TIME PER 15 MIN (STAT)

## 2020-03-15 PROCEDURE — 25000242 PHARM REV CODE 250 ALT 637 W/ HCPCS: Performed by: SURGERY

## 2020-03-15 PROCEDURE — 20600001 HC STEP DOWN PRIVATE ROOM

## 2020-03-15 PROCEDURE — 27000221 HC OXYGEN, UP TO 24 HOURS

## 2020-03-15 PROCEDURE — 25000003 PHARM REV CODE 250: Performed by: SURGERY

## 2020-03-15 PROCEDURE — 85730 THROMBOPLASTIN TIME PARTIAL: CPT

## 2020-03-15 PROCEDURE — 80053 COMPREHEN METABOLIC PANEL: CPT | Mod: 91

## 2020-03-15 PROCEDURE — 63600175 PHARM REV CODE 636 W HCPCS: Performed by: SURGERY

## 2020-03-15 RX ORDER — METOPROLOL TARTRATE 50 MG/1
50 TABLET ORAL 2 TIMES DAILY
Status: DISCONTINUED | OUTPATIENT
Start: 2020-03-15 | End: 2020-03-16 | Stop reason: HOSPADM

## 2020-03-15 RX ORDER — METOPROLOL TARTRATE 25 MG/1
12.5 TABLET ORAL ONCE
Status: COMPLETED | OUTPATIENT
Start: 2020-03-15 | End: 2020-03-15

## 2020-03-15 RX ORDER — POTASSIUM CHLORIDE 20 MEQ/1
20 TABLET, EXTENDED RELEASE ORAL 2 TIMES DAILY
Status: DISCONTINUED | OUTPATIENT
Start: 2020-03-15 | End: 2020-03-16 | Stop reason: HOSPADM

## 2020-03-15 RX ADMIN — METOPROLOL TARTRATE 37.5 MG: 25 TABLET ORAL at 08:03

## 2020-03-15 RX ADMIN — LEVALBUTEROL HYDROCHLORIDE 0.63 MG: 0.63 SOLUTION RESPIRATORY (INHALATION) at 05:03

## 2020-03-15 RX ADMIN — FUROSEMIDE 40 MG: 10 INJECTION, SOLUTION INTRAMUSCULAR; INTRAVENOUS at 08:03

## 2020-03-15 RX ADMIN — LEVALBUTEROL HYDROCHLORIDE 0.63 MG: 0.63 SOLUTION RESPIRATORY (INHALATION) at 07:03

## 2020-03-15 RX ADMIN — MUPIROCIN 1 G: 20 OINTMENT TOPICAL at 08:03

## 2020-03-15 RX ADMIN — MICONAZOLE NITRATE: 20 OINTMENT TOPICAL at 08:03

## 2020-03-15 RX ADMIN — FUROSEMIDE 40 MG: 10 INJECTION, SOLUTION INTRAMUSCULAR; INTRAVENOUS at 09:03

## 2020-03-15 RX ADMIN — METOPROLOL TARTRATE 12.5 MG: 25 TABLET, FILM COATED ORAL at 09:03

## 2020-03-15 RX ADMIN — POTASSIUM CHLORIDE 20 MEQ: 20 TABLET, EXTENDED RELEASE ORAL at 11:03

## 2020-03-15 RX ADMIN — ASPIRIN 325 MG ORAL TABLET 325 MG: 325 PILL ORAL at 08:03

## 2020-03-15 RX ADMIN — POTASSIUM CHLORIDE 20 MEQ: 20 TABLET, EXTENDED RELEASE ORAL at 09:03

## 2020-03-15 RX ADMIN — CITALOPRAM HYDROBROMIDE 40 MG: 20 TABLET ORAL at 08:03

## 2020-03-15 RX ADMIN — ATORVASTATIN CALCIUM 10 MG: 10 TABLET, FILM COATED ORAL at 09:03

## 2020-03-15 RX ADMIN — OXYCODONE HYDROCHLORIDE 10 MG: 10 TABLET ORAL at 03:03

## 2020-03-15 RX ADMIN — OXYCODONE HYDROCHLORIDE 10 MG: 10 TABLET ORAL at 06:03

## 2020-03-15 RX ADMIN — ONDANSETRON 8 MG: 4 TABLET, ORALLY DISINTEGRATING ORAL at 08:03

## 2020-03-15 RX ADMIN — PANTOPRAZOLE SODIUM 40 MG: 40 TABLET, DELAYED RELEASE ORAL at 08:03

## 2020-03-15 RX ADMIN — NICOTINE 1 PATCH: 7 PATCH, EXTENDED RELEASE TRANSDERMAL at 08:03

## 2020-03-15 RX ADMIN — OXYCODONE HYDROCHLORIDE 10 MG: 10 TABLET ORAL at 11:03

## 2020-03-15 RX ADMIN — PROMETHAZINE HYDROCHLORIDE 12.5 MG: 25 INJECTION INTRAMUSCULAR; INTRAVENOUS at 02:03

## 2020-03-15 RX ADMIN — TRAZODONE HYDROCHLORIDE 50 MG: 50 TABLET ORAL at 09:03

## 2020-03-15 RX ADMIN — OXYCODONE HYDROCHLORIDE 10 MG: 10 TABLET ORAL at 09:03

## 2020-03-15 RX ADMIN — METOPROLOL TARTRATE 50 MG: 50 TABLET, FILM COATED ORAL at 09:03

## 2020-03-15 NOTE — ASSESSMENT & PLAN NOTE
Michelle Hodges is a 49 y.o. female with aortic stenosis after mechanical AVR in 2012 underwent tissue AVR (Perceval) (3/10/20).  --continue asa, statin, metop and lasix  --increase metop to 50 bid  --continue acapella and xopenex nebs  --encourage ambulation and wean O2 as tolerated  --d/c wires today  --sternal precautions  --possible home tomorrow if O2 weaned off

## 2020-03-15 NOTE — PLAN OF CARE
Plan of care discussed with patient.  Patient ambulating independently in hallway, fall precautions in place.Continuing to encourage sternal precautions, IS, and ambulation. Patient pain controled by PRN medications. Discussed medications and care. Being diuresed with 40 mg IVP lasix BID. Pacer wires pulled today. Patient has no questions at this time. Will continue to monitor.

## 2020-03-15 NOTE — PLAN OF CARE
Patient remained free of falls, trauma, injury, and skin breakdown.  VSS; pt complaint of pain- PRN medications administered.  Pacer wires isolated and secured.  Fall and midsternal precautions maintained; education provided.  Plan of care reviewed; patient verbalized understanding.  All questions and concerns addressed; will continue to monitor.

## 2020-03-15 NOTE — SUBJECTIVE & OBJECTIVE
Interval History: pain controlled; ambulated several times yesterday; having BMs    ROS  Medications:  Continuous Infusions:  Scheduled Meds:   aspirin  325 mg Oral Daily    atorvastatin  10 mg Oral QHS    citalopram  40 mg Oral Daily    docusate sodium  100 mg Oral Daily    furosemide (LASIX) IV  40 mg Intravenous Q12H    levalbuterol  0.63 mg Nebulization Q8H    metoprolol tartrate  12.5 mg Oral Once    metoprolol tartrate  50 mg Oral BID    miconazole nitrate 2%   Topical (Top) BID    mupirocin  1 g Nasal BID    nicotine  1 patch Transdermal Daily    pantoprazole  40 mg Oral Daily    polyethylene glycol  17 g Oral Daily    traZODone  50 mg Oral QHS     PRN Meds:sodium chloride, sodium chloride, acetaminophen, bisacodyL, Dextrose 10% Bolus, Dextrose 10% Bolus, dextrose 50%, diphenhydrAMINE, guaiFENesin, ondansetron, ondansetron, oxyCODONE, oxyCODONE, promethazine (PHENERGAN) IVPB     Objective:     Vital Signs (Most Recent):  Temp: 97.7 °F (36.5 °C) (03/15/20 0738)  Pulse: 97 (03/15/20 0854)  Resp: 18 (03/15/20 0751)  BP: 139/66 (03/15/20 0738)  SpO2: 96 % (03/15/20 0751) Vital Signs (24h Range):  Temp:  [97.7 °F (36.5 °C)-98.7 °F (37.1 °C)] 97.7 °F (36.5 °C)  Pulse:  [] 97  Resp:  [14-20] 18  SpO2:  [94 %-96 %] 96 %  BP: (113-139)/(64-83) 139/66     Weight: 76.2 kg (167 lb 15.9 oz)  Body mass index is 30.73 kg/m².    SpO2: 96 %  O2 Device (Oxygen Therapy): nasal cannula    Intake/Output - Last 3 Shifts       03/13 0700 - 03/14 0659 03/14 0700 - 03/15 0659 03/15 0700 - 03/16 0659    P.O. 700 1080     I.V. (mL/kg)       Blood       IV Piggyback 250      Total Intake(mL/kg) 950 (11.9) 1080 (14.2)     Urine (mL/kg/hr) 2025 (1.1) 3450 (1.9)     Stool 0 0     Chest Tube 70      Total Output 2095 7750     Net -1140 -7262            Urine Occurrence  1 x     Stool Occurrence 2 x 2 x           Lines/Drains/Airways     Line                 Pacer Wires 03/10/20 1401 4 days          Peripheral Intravenous  Line                 Peripheral IV - Single Lumen Anterior;Left Upper Arm -- days                Physical Exam  Incision c/d/i; wires in place    Significant Labs:  BMP:   Recent Labs   Lab 03/14/20  0402 03/14/20  1834   GLU 64* 98    139   K 4.1 3.8    101   CO2 25 29   BUN 10 12   CREATININE 0.7 0.7   CALCIUM 8.6* 8.9   MG 1.9  --      CBC:   Recent Labs   Lab 03/15/20  0640   WBC 5.71   RBC 3.11*   HGB 9.1*   HCT 29.2*   PLT 40*   MCV 94   MCH 29.3   MCHC 31.2*     CMP:   Recent Labs   Lab 03/14/20  1834   GLU 98   CALCIUM 8.9   ALBUMIN 3.3*   PROT 6.0      K 3.8   CO2 29      BUN 12   CREATININE 0.7   ALKPHOS 88   *   *   BILITOT 0.7       Significant Diagnostics:  CXR: improved edema and atelectasis  ECHO (3/14/20):  · Normal left ventricular systolic function. The estimated ejection fraction is 63%.  · Concentric left ventricular remodeling.  · Local segmental wall motion abnormalities.  · Grade II (moderate) left ventricular diastolic dysfunction consistent with pseudonormalization.  · Low normal right ventricular systolic function.  · There is a bioprosthetic aortic valve present. There is no aortic aortic insufficiency present.  · Moderate to severe tricuspid regurgitation.  · Mild pulmonic regurgitation.  · Intermediate central venous pressure (8 mmHg).  · The estimated PA systolic pressure is 51 mmHg.  · Pulmonary hypertension present.

## 2020-03-16 VITALS
TEMPERATURE: 98 F | DIASTOLIC BLOOD PRESSURE: 61 MMHG | WEIGHT: 165.13 LBS | RESPIRATION RATE: 18 BRPM | BODY MASS INDEX: 30.39 KG/M2 | HEIGHT: 62 IN | SYSTOLIC BLOOD PRESSURE: 101 MMHG | OXYGEN SATURATION: 95 % | HEART RATE: 73 BPM

## 2020-03-16 LAB
APTT BLDCRRT: 23.8 SEC (ref 21–32)
BASOPHILS # BLD AUTO: 0.05 K/UL (ref 0–0.2)
BASOPHILS NFR BLD: 1 % (ref 0–1.9)
DIFFERENTIAL METHOD: ABNORMAL
EOSINOPHIL # BLD AUTO: 0.3 K/UL (ref 0–0.5)
EOSINOPHIL NFR BLD: 6 % (ref 0–8)
ERYTHROCYTE [DISTWIDTH] IN BLOOD BY AUTOMATED COUNT: 15.9 % (ref 11.5–14.5)
HCT VFR BLD AUTO: 29.8 % (ref 37–48.5)
HGB BLD-MCNC: 9.3 G/DL (ref 12–16)
IMM GRANULOCYTES # BLD AUTO: 0.06 K/UL (ref 0–0.04)
IMM GRANULOCYTES NFR BLD AUTO: 1.2 % (ref 0–0.5)
INR PPP: 1 (ref 0.8–1.2)
LYMPHOCYTES # BLD AUTO: 2 K/UL (ref 1–4.8)
LYMPHOCYTES NFR BLD: 37.6 % (ref 18–48)
MCH RBC QN AUTO: 29.8 PG (ref 27–31)
MCHC RBC AUTO-ENTMCNC: 31.2 G/DL (ref 32–36)
MCV RBC AUTO: 96 FL (ref 82–98)
MONOCYTES # BLD AUTO: 0.7 K/UL (ref 0.3–1)
MONOCYTES NFR BLD: 12.9 % (ref 4–15)
NEUTROPHILS # BLD AUTO: 2.1 K/UL (ref 1.8–7.7)
NEUTROPHILS NFR BLD: 41.3 % (ref 38–73)
NRBC BLD-RTO: 0 /100 WBC
PLATELET # BLD AUTO: 46 K/UL (ref 150–350)
PMV BLD AUTO: 11 FL (ref 9.2–12.9)
PROTHROMBIN TIME: 10.3 SEC (ref 9–12.5)
RBC # BLD AUTO: 3.12 M/UL (ref 4–5.4)
WBC # BLD AUTO: 5.18 K/UL (ref 3.9–12.7)

## 2020-03-16 PROCEDURE — 25000003 PHARM REV CODE 250: Performed by: STUDENT IN AN ORGANIZED HEALTH CARE EDUCATION/TRAINING PROGRAM

## 2020-03-16 PROCEDURE — 94761 N-INVAS EAR/PLS OXIMETRY MLT: CPT

## 2020-03-16 PROCEDURE — 27000221 HC OXYGEN, UP TO 24 HOURS

## 2020-03-16 PROCEDURE — S4991 NICOTINE PATCH NONLEGEND: HCPCS | Performed by: STUDENT IN AN ORGANIZED HEALTH CARE EDUCATION/TRAINING PROGRAM

## 2020-03-16 PROCEDURE — 85610 PROTHROMBIN TIME: CPT

## 2020-03-16 PROCEDURE — 36415 COLL VENOUS BLD VENIPUNCTURE: CPT

## 2020-03-16 PROCEDURE — 85025 COMPLETE CBC W/AUTO DIFF WBC: CPT

## 2020-03-16 PROCEDURE — 25000003 PHARM REV CODE 250: Performed by: SURGERY

## 2020-03-16 PROCEDURE — 94640 AIRWAY INHALATION TREATMENT: CPT

## 2020-03-16 PROCEDURE — 85730 THROMBOPLASTIN TIME PARTIAL: CPT

## 2020-03-16 PROCEDURE — 97530 THERAPEUTIC ACTIVITIES: CPT

## 2020-03-16 RX ORDER — ATORVASTATIN CALCIUM 10 MG/1
10 TABLET, FILM COATED ORAL NIGHTLY
Qty: 90 TABLET | Refills: 3 | Status: SHIPPED | OUTPATIENT
Start: 2020-03-16 | End: 2021-03-16

## 2020-03-16 RX ORDER — POTASSIUM CHLORIDE 20 MEQ/1
TABLET, EXTENDED RELEASE ORAL
Qty: 30 TABLET | Refills: 11 | Status: SHIPPED | OUTPATIENT
Start: 2020-03-16

## 2020-03-16 RX ORDER — METOPROLOL TARTRATE 50 MG/1
50 TABLET ORAL 2 TIMES DAILY
Qty: 60 TABLET | Refills: 11 | Status: SHIPPED | OUTPATIENT
Start: 2020-03-16 | End: 2021-03-16

## 2020-03-16 RX ORDER — FUROSEMIDE 20 MG/1
TABLET ORAL
Qty: 60 TABLET | Refills: 11 | Status: SHIPPED | OUTPATIENT
Start: 2020-03-16

## 2020-03-16 RX ORDER — DOCUSATE SODIUM 100 MG/1
100 CAPSULE, LIQUID FILLED ORAL 2 TIMES DAILY PRN
Qty: 30 CAPSULE | Refills: 0 | Status: SHIPPED | OUTPATIENT
Start: 2020-03-16

## 2020-03-16 RX ORDER — PANTOPRAZOLE SODIUM 40 MG/1
40 TABLET, DELAYED RELEASE ORAL DAILY
Qty: 30 TABLET | Refills: 11 | Status: SHIPPED | OUTPATIENT
Start: 2020-03-17 | End: 2021-03-17

## 2020-03-16 RX ORDER — NICOTINE 7MG/24HR
1 PATCH, TRANSDERMAL 24 HOURS TRANSDERMAL DAILY
Qty: 14 PATCH | Refills: 3 | Status: SHIPPED | OUTPATIENT
Start: 2020-03-17

## 2020-03-16 RX ORDER — OXYCODONE HYDROCHLORIDE 5 MG/1
5 TABLET ORAL EVERY 4 HOURS PRN
Qty: 42 TABLET | Refills: 0 | Status: SHIPPED | OUTPATIENT
Start: 2020-03-16 | End: 2020-03-18 | Stop reason: ALTCHOICE

## 2020-03-16 RX ORDER — ONDANSETRON 8 MG/1
8 TABLET, ORALLY DISINTEGRATING ORAL EVERY 8 HOURS PRN
Qty: 30 TABLET | Refills: 0 | Status: SHIPPED | OUTPATIENT
Start: 2020-03-16

## 2020-03-16 RX ORDER — ASPIRIN 325 MG
325 TABLET ORAL DAILY
Qty: 30 TABLET | Refills: 11 | Status: SHIPPED | OUTPATIENT
Start: 2020-03-17 | End: 2021-03-17

## 2020-03-16 RX ORDER — GUAIFENESIN 600 MG/1
600 TABLET, EXTENDED RELEASE ORAL EVERY 12 HOURS PRN
Qty: 30 TABLET | Refills: 0 | Status: SHIPPED | OUTPATIENT
Start: 2020-03-16 | End: 2020-03-26

## 2020-03-16 RX ORDER — ACETAMINOPHEN 325 MG/1
650 TABLET ORAL EVERY 6 HOURS PRN
Qty: 30 TABLET | Refills: 3 | Status: SHIPPED | OUTPATIENT
Start: 2020-03-16

## 2020-03-16 RX ADMIN — NICOTINE 1 PATCH: 7 PATCH, EXTENDED RELEASE TRANSDERMAL at 08:03

## 2020-03-16 RX ADMIN — ASPIRIN 325 MG ORAL TABLET 325 MG: 325 PILL ORAL at 08:03

## 2020-03-16 RX ADMIN — POTASSIUM CHLORIDE 20 MEQ: 20 TABLET, EXTENDED RELEASE ORAL at 08:03

## 2020-03-16 RX ADMIN — OXYCODONE HYDROCHLORIDE 10 MG: 10 TABLET ORAL at 08:03

## 2020-03-16 RX ADMIN — CITALOPRAM HYDROBROMIDE 40 MG: 20 TABLET ORAL at 08:03

## 2020-03-16 RX ADMIN — METOPROLOL TARTRATE 50 MG: 50 TABLET, FILM COATED ORAL at 08:03

## 2020-03-16 RX ADMIN — PANTOPRAZOLE SODIUM 40 MG: 40 TABLET, DELAYED RELEASE ORAL at 08:03

## 2020-03-16 RX ADMIN — OXYCODONE HYDROCHLORIDE 10 MG: 10 TABLET ORAL at 04:03

## 2020-03-16 NOTE — HOSPITAL COURSE
On 3/10/2020, the patient was taken to the Operating Room for the above stated procedure. Please see the previously dictated operative report for complete details. Postoperatively, the patient was taken from the  Operating Room to the ICU where the vital signs were monitored and pain was kept under control. The patient was weaned from the drips and extubated in the ICU per protocol. Once hemodynamically stable, the patient was transferred to the Cardiac Step-Down floor for continued strengthening and ambulation. On postoperative day 6, the patient was ready for discharge to home. At the time of discharge, the patient was ambulating unassisted. Patient was still using O2 but has home oxygen already. Platelets were low at 46 but slowly improving. HIT panel pending. Pain was well controlled with oral analgesics and the patient was tolerating the diet.     MOBILITY AND ACTIVITY: As tolerated. Patient may shower. No heavy lifting of greater than 5 pounds and no driving.     DIET: An 1800-calorie ADA with a 1500 mL fluid restriction.     WOUND CARE INSTRUCTIONS: Check for redness, swelling and drainage around the  incision or wound. Patient is to call for any obvious bleeding, drainage, pus from the wound, unusual problems or difficulties or temperature of greater than 101   degrees.     FOLLOWUP: Follow up with Dr. Jara in approximately 3 weeks. Prior to this  appointment, the patient will have a chest x-ray and EKG.     Patient not placed on Ace-Inhibitor at the time of discharge due to potential for hypotension     DISCHARGE CONDITION: At the time of discharge, the patient was in sinus rhythm and afebrile with stable vital signs.

## 2020-03-16 NOTE — PLAN OF CARE
Problem: Occupational Therapy Goal  Goal: Occupational Therapy Goal  Description  Goals to be met by: 3/21/20     Patient will increase functional independence with ADLs by performing:    UE Dressing with Supervision.  LE Dressing with Stand-by Assistance.  Grooming while standing at sink with Stand-by Assistance.  Toileting from toilet with Stand-by Assistance for hygiene and clothing management.   Toilet transfer to toilet with Stand-by Assistance.- met on 3/16      Outcome: Ongoing, Progressing     Pt progressing well towards OT goals. Frequency decreased to 3x/wk due to progress thus far. Anticipate 1-2 more sessions. OT POC remains appropriate for patient on this date. Pt will continue to benefit from skilled OT to address the deficits affecting her occupational performance.    Dawna Patten OTR/L  3/16/20

## 2020-03-16 NOTE — PLAN OF CARE
03/16/20 1059   Post-Acute Status   Post-Acute Authorization Home Health/Hospice   Home Health/Hospice Status Referrals Sent     SW sent referral to [facility] inquiring about [facility] provider that services pt's area.    UPDATE 11:13 AM  SW sent HH referral to [facility] per Jaye with [facility].    Tresa Claros LMSW  [facility]  [phone]

## 2020-03-16 NOTE — DISCHARGE SUMMARY
[facility]  Cardiothoracic Surgery  Discharge Summary      Patient Name: Michelle Hodges  MRN: [id number]  Admission Date: 3/10/2020   Hospital Length of Stay: 6 days  Discharge Date and Time:  03/16/2020 9:52 AM  Attending Physician: Jack Jara MD   Discharging Provider: Erica Martinez PA-C  Primary Care Provider: Prem Giron Jr, MD    HPI:   Michelle Hodges is a 49 y.o. female who presents with s/p mechanical AVR 2012 in [facility], COPD on home oxygen PRN 2L NC, stroke in 2011 with left sided deficits that resolved after TPA and current smoker. Pt was told her aortic valve was not working and went in for reop for redo AVR then SHADE normal functioning aortic valve, surgery was cancelled. Pt has c/o increase COOPER, leg swelling and chest pain that has processed over the past 2 years.     Procedure(s) (LRB):  REPLACEMENT, AORTIC VALVE, WITH REPEAT STERNOTOMY (N/A)      Hospital Course: On 3/10/2020, the patient was taken to the Operating Room for the above stated procedure. Please see the previously dictated operative report for complete details. Postoperatively, the patient was taken from the  Operating Room to the ICU where the vital signs were monitored and pain was kept under control. The patient was weaned from the drips and extubated in the ICU per protocol. Once hemodynamically stable, the patient was transferred to the Cardiac Step-Down floor for continued strengthening and ambulation. On postoperative day 6, the patient was ready for discharge to home. At the time of discharge, the patient was ambulating unassisted. Patient was still using O2 but has home oxygen already. Platelets were low at 46 but slowly improving. HIT panel pending. Pain was well controlled with oral analgesics and the patient was tolerating the diet.     MOBILITY AND ACTIVITY: As tolerated. Patient may shower. No heavy lifting of greater than 5 pounds and no driving.     DIET: An 1800-calorie ADA  with a 1500 mL fluid restriction.     WOUND CARE INSTRUCTIONS: Check for redness, swelling and drainage around the  incision or wound. Patient is to call for any obvious bleeding, drainage, pus from the wound, unusual problems or difficulties or temperature of greater than 101   degrees.     FOLLOWUP: Follow up with Dr. Jara in approximately 3 weeks. Prior to this  appointment, the patient will have a chest x-ray and EKG.     Patient not placed on Ace-Inhibitor at the time of discharge due to potential for hypotension     DISCHARGE CONDITION: At the time of discharge, the patient was in sinus rhythm and afebrile with stable vital signs.    Consults (From admission, onward)        Status Ordering Provider     Consult to Endocrinology  Once     Provider:  (Not yet assigned)    Completed RILEY ALONZO     Inpatient consult to Registered Dietitian/Nutritionist  Once     Provider:  (Not yet assigned)    Completed RILEY ALONZO     Inpatient consult to Social Work/Case Management  Once     Provider:  (Not yet assigned)    RAYMOND Feliciano          Significant Diagnostic Studies:     Pending Diagnostic Studies:     Procedure Component Value Units Date/Time    Basic metabolic panel [[id number]]     Order Status:  Sent Lab Status:  No result     Specimen:  Blood     Specimen to Pathology, Surgery Cardiovascular [[id number]] Collected:  03/10/20 1315    Order Status:  Sent Lab Status:  In process Updated:  03/12/20 0825            Final Active Diagnoses:    Diagnosis Date Noted POA    PRINCIPAL PROBLEM:  S/P AVR (aortic valve replacement) [Z95.2] 01/28/2020 Not Applicable    Atrial fibrillation [I48.91]  Yes    Thrombocytopenia [D69.6]  Yes    Postprocedural hypotension [I95.81]  Yes    Acute hyperglycemia [R73.9] 03/10/2020 Unknown      Problems Resolved During this Admission:      Discharged Condition: stable    Disposition: Home or Self Care    Follow Up:    Patient Instructions:   No  discharge procedures on file.  Medications:  Reconciled Home Medications:      Medication List      START taking these medications    acetaminophen 325 MG tablet  Commonly known as:  TYLENOL  Take 2 tablets (650 mg total) by mouth every 6 (six) hours as needed for Temperature greater than (or equal to 101 degree F).     aspirin 325 MG tablet  Take 1 tablet (325 mg total) by mouth once daily.  Start taking on:  March 17, 2020  Replaces:  aspirin 81 MG EC tablet     docusate sodium 100 MG capsule  Commonly known as:  COLACE  Take 1 capsule (100 mg total) by mouth 2 (two) times daily as needed for Constipation.     guaiFENesin 600 mg 12 hr tablet  Commonly known as:  MUCINEX  Take 1 tablet (600 mg total) by mouth every 12 (twelve) hours as needed (inability to cough secretions).     metoprolol tartrate 50 MG tablet  Commonly known as:  LOPRESSOR  Take 1 tablet (50 mg total) by mouth 2 (two) times daily.     miconazole nitrate 2% 2 % Oint  Commonly known as:  MICOTIN  Apply topically 2 (two) times daily.     nicotine 7 mg/24 hr  Commonly known as:  NICODERM CQ  Place 1 patch onto the skin once daily.  Start taking on:  March 17, 2020     ondansetron 8 MG Tbdl  Commonly known as:  ZOFRAN-ODT  Take 1 tablet (8 mg total) by mouth every 8 (eight) hours as needed.     oxyCODONE 5 MG immediate release tablet  Commonly known as:  ROXICODONE  Take 1 tablet (5 mg total) by mouth every 4 (four) hours as needed.     potassium chloride SA 20 MEQ tablet  Commonly known as:  K-DUR,KLOR-CON  Take one tablet by mouth twice daily for 7 days then decrease to once daily  Replaces:  potassium chloride 10 MEQ Tbsr        CHANGE how you take these medications    atorvastatin 10 MG tablet  Commonly known as:  LIPITOR  Take 1 tablet (10 mg total) by mouth every evening.  What changed:    · how much to take  · how to take this  · when to take this     furosemide 20 MG tablet  Commonly known as:  LASIX  Take one tablet by mouth twice daily for 7  days then decrease to once daily  What changed:    · medication strength  · how much to take  · how to take this  · additional instructions     pantoprazole 40 MG tablet  Commonly known as:  PROTONIX  Take 1 tablet (40 mg total) by mouth once daily.  Start taking on:  March 17, 2020  What changed:  when to take this        CONTINUE taking these medications    * albuterol 5 mg/mL nebulizer solution  Commonly known as:  PROVENTIL  Inhale 2.5 mg into the lungs.     * albuterol 90 mcg/actuation inhaler  Commonly known as:  PROVENTIL/VENTOLIN HFA  Inhale 1 puff into the lungs every 4 (four) hours as needed.     * VENTOLIN HFA 90 mcg/actuation inhaler  Generic drug:  albuterol     albuterol-ipratropium 2.5 mg-0.5 mg/3 mL nebulizer solution  Commonly known as:  DUO-NEB  3 mLs daily as needed.     allopurinoL 300 MG tablet  Commonly known as:  ZYLOPRIM  Take 300 mg by mouth.     baclofen 20 MG tablet  Commonly known as:  LIORESAL  Take 20 mg by mouth.     citalopram 40 MG tablet  Commonly known as:  CELEXA  Take 40 mg by mouth.     diazePAM 5 MG tablet  Commonly known as:  VALIUM  Take 5 mg by mouth.     fluticasone furoate-vilanteroL 100-25 mcg/dose diskus inhaler  Commonly known as:  BREO  Inhale 1 puff into the lungs.     gabapentin 600 MG tablet  Commonly known as:  NEURONTIN     hydroxychloroquine 200 mg tablet  Commonly known as:  PLAQUENIL  Take 200 mg by mouth.     leflunomide 20 MG Tab  Commonly known as:  ARAVA     LINZESS 145 mcg Cap capsule  Generic drug:  linaCLOtide  Take 145 mcg by mouth.     MYRBETRIQ 25 mg Tb24 ER tablet  Generic drug:  mirabegron     * OXYGEN-AIR DELIVERY SYSTEMS MISC  by Other route.     * OXYGEN-AIR DELIVERY SYSTEMS MISC  by Other route.     promethazine 25 MG tablet  Commonly known as:  PHENERGAN  Take 25 mg by mouth every 6 (six) hours as needed.     sucralfate 1 gram tablet  Commonly known as:  CARAFATE     temazepam 7.5 MG Cap  Commonly known as:  RESTORIL  Take 7.5 mg by mouth.      thiamine 100 MG tablet  Take 100 mg by mouth.     traZODone 50 MG tablet  Commonly known as:  DESYREL  Take 50 mg by mouth.         * This list has 5 medication(s) that are the same as other medications prescribed for you. Read the directions carefully, and ask your doctor or other care provider to review them with you.            STOP taking these medications    aspirin 81 MG EC tablet  Commonly known as:  ECOTRIN  Replaced by:  aspirin 325 MG tablet     cyclobenzaprine 10 MG tablet  Commonly known as:  FLEXERIL     enoxaparin 150 mg/mL Syrg  Commonly known as:  LOVENOX     HYDROcodone-acetaminophen 5-325 mg per tablet  Commonly known as:  NORCO     levoFLOXacin 750 MG tablet  Commonly known as:  LEVAQUIN     lisinopriL 20 MG tablet  Commonly known as:  PRINIVIL,ZESTRIL     metoprolol succinate 25 MG 24 hr tablet  Commonly known as:  TOPROL-XL     oxyCODONE-acetaminophen 5-325 mg per tablet  Commonly known as:  PERCOCET     potassium chloride 10 MEQ Tbsr  Commonly known as:  KLOR-CON  Replaced by:  potassium chloride SA 20 MEQ tablet     ranitidine 300 MG tablet  Commonly known as:  ZANTAC     warfarin 10 MG tablet  Commonly known as:  COUMADIN     warfarin 2 MG tablet  Commonly known as:  COUMADIN     warfarin 7.5 MG tablet  Commonly known as:  COUMADIN          Time spent on the discharge of patient: 35 minutes    Erica Martinez PA-C  Cardiothoracic Surgery  [facility]

## 2020-03-16 NOTE — PT/OT/SLP PROGRESS
Occupational Therapy   Treatment    Name: Michelle Hodges  MRN: [id number]  Admitting Diagnosis:  S/P AVR (aortic valve replacement)  6 Days Post-Op    Recommendations:     Discharge Recommendations: home health OT  Discharge Equipment Recommendations:  none  Barriers to discharge:  None    Assessment:     Michelle Hodges is a 49 y.o. female with a medical diagnosis of S/P AVR (aortic valve replacement).  She presents with the following performance deficits affecting function:  decreased safety awareness, impaired endurance, orthopedic precautions, impaired cardiopulmonary response to activity. Pt tolerated endurance building focused session well requiring no rest breaks during household ambulation. Pt required decreased assistance during functional mobility requirng SBA and no AD. Pt verbalized and followed 3/3 sternal precautions throughout the session. Pt progressing well towards OT goals. Anticipate 1-2 more sessions before possible d/c from OT services. OT POC remains appropriate for patient on this date. Pt will continue to benefit from skilled OT to address the deficits listed above.     Rehab Prognosis:  Good; patient would benefit from acute skilled OT services to address these deficits and reach maximum level of function.       Plan:     Patient to be seen 3 x/week to address the above listed problems via self-care/home management, therapeutic activities, therapeutic exercises  · Plan of Care Expires: 04/10/20  · Plan of Care Reviewed with: patient, spouse    Subjective     Pain/Comfort:  · Pain Rating 1: 8/10  · Location - Orientation 1: generalized  · Location 1: sternal  · Pain Addressed 1: Pre-medicate for activity, Reposition  · Pain Rating Post-Intervention 1: 8/10  · Pain Addressed 2: Cessation of Activity, Nurse notified    Objective:     Communicated with: RN prior to session.  Patient found HOB elevated with central line, telemetry, oxygen, pulse ox (continuous) upon OT entry to room.    General  Precautions: Standard, fall, sternal   Orthopedic Precautions:N/A   Braces: N/A     Occupational Performance:     Bed Mobility:  HOB elevated  · Patient completed Rolling/Turning to Left with  modified independence  · Patient completed Scooting/Bridging with modified independence  · Patient completed Supine to Sit with modified independence     Functional Mobility/Transfers:  · Patient completed Sit <> Stand Transfer from bed with supervision  with  no assistive device   · Utilized pillow to protect sternum during transfers  · Patient completed Toilet Transfer Step Transfer technique with independence with  no AD  · Functional Mobility: Pt ambulated ~300 ft with SBA and no AD to build strength and endurance to return home and complete occupations of choice. No LOB noted. No RBs required.   · 3 L of 02 remained donned during session     Activities of Daily Living:  · Lower Body Dressing: set up assistance to don slip in shoes with a  seated EOB       [facility] 6 Click ADL: 21    Treatment & Education:  - Role of OT/ OT POC  - Self care safety/ independence  - Functional transfer/ mobility safety  - Bed mobility safety  - Pursed lip breathing  - Energy conservation techniques such as pacing and taking rest breaks as needed  - Schley reported with self care such as UB/LB dressing   - Importance of ambulation to prevent further debility    Patient left seated EOB with all lines intact, call button in reach and spouse presentEducation:      GOALS:   Multidisciplinary Problems     Occupational Therapy Goals     Not on file          Multidisciplinary Problems (Resolved)        Problem: Occupational Therapy Goal    Goal Priority Disciplines Outcome Interventions   Occupational Therapy Goal   (Resolved)     OT, PT/OT Met    Description:  Goals to be met by: 3/21/20     Patient will increase functional independence with ADLs by performing:    UE Dressing with Supervision.  LE Dressing with Stand-by Assistance.  Grooming  while standing at sink with Stand-by Assistance.  Toileting from toilet with Stand-by Assistance for hygiene and clothing management.   Toilet transfer to toilet with Stand-by Assistance.- met on 3/16                       Time Tracking:     OT Date of Treatment: 03/16/20  OT Start Time: 0840  OT Stop Time: 0852  OT Total Time (min): 12 min    Billable Minutes:Therapeutic Activity 12    Dawna Patten OT  3/16/2020

## 2020-03-16 NOTE — HPI
Michelle Hodges is a 49 y.o. female who presents with s/p mechanical AVR 2012 in [facility], COPD on home oxygen PRN 2L NC, stroke in 2011 with left sided deficits that resolved after TPA and current smoker. Pt was told her aortic valve was not working and went in for reop for redo AVR then SHADE normal functioning aortic valve, surgery was cancelled. Pt has c/o increase COOPER, leg swelling and chest pain that has processed over the past 2 years.

## 2020-03-16 NOTE — PROGRESS NOTES
Spoke with HARLAN Martinez about AVS for unchanged medications. HARLAN Martinez stated for patient to resume normal routine for all medications not changed.    HARLAN Martinez also informed that pt refused IV push lasix this AM due to not wanting to have to stop to urinate on her way home. No new orders at this time.

## 2020-03-16 NOTE — PT/OT/SLP PROGRESS
Physical Therapy      Patient Name:  Michelle Hodges   MRN:  [id number]    Patient not seen today secondary to (Pt declined PT this date 2* awaiting d/c.). Pt reports that she has been ambulating in hallway without difficulty and has been maintaining sternal precautions throughout mobility. Pt denied acute PT needs/concerns at this time. Will follow-up if pt does not d/c as planned.    Santa Davenport, PT, DPT   3/16/2020  [phone]

## 2020-03-16 NOTE — PLAN OF CARE
Pt d/c with home health. Follow up to be scheduled by [facility] clinic staff.       03/16/20 1505   Final Note   Assessment Type Final Discharge Note   Anticipated Discharge Disposition Home-Health   Hospital Follow Up  Appt(s) scheduled? Yes   Discharge plans and expectations educations in teach back method with documentation complete? Yes     Julie Haase RN  Case Management [phone]

## 2020-03-16 NOTE — PLAN OF CARE
Plan of care discussed with patient.  Patient ambulating independently, fall precautions in place.Continuing to encourage sternal precautions, IS, and ambulation. Patient has occasional c/o pain to incision site. Discussed medications and care. Patient has no questions at this time. Will continue to monitor.

## 2020-03-16 NOTE — PLAN OF CARE
03/16/20 1228   Post-Acute Status   Post-Acute Authorization Home Health/Hospice   Home Health/Hospice Status Set-up Complete     Pt accepted by [facility]  for admit tomorrow.  FRAN faxed discharge summary.    Tresa Claros LMSW  [facility]  [phone]

## 2020-03-17 ENCOUNTER — PATIENT MESSAGE (OUTPATIENT)
Dept: CARDIOTHORACIC SURGERY | Facility: CLINIC | Age: 50
End: 2020-03-17

## 2020-03-17 DIAGNOSIS — Z95.2 S/P AVR: Primary | ICD-10-CM

## 2020-03-17 RX ORDER — HYDROCODONE BITARTRATE AND ACETAMINOPHEN 5; 325 MG/1; MG/1
1 TABLET ORAL EVERY 4 HOURS PRN
Qty: 42 TABLET | Refills: 0 | Status: SHIPPED | OUTPATIENT
Start: 2020-03-17 | End: 2020-03-24

## 2020-03-17 NOTE — OP NOTE
DATE OF PROCEDURE:  03/10/2020     ATTENDING SURGEON:  Jack Jara M.D.     ASSISTANT:  Robert Bond M.D. (Cardiothoracic resident)     PREOPERATIVE DIAGNOSES:  1. Prosthetic valve dysfunction  2. Aortic stenosis  3. COPD  4. Previous stroke  5. Chronic anticoagulation  6. S/P mechanical aortic valve replacement in 2012     POSTOPERATIVE DIAGNOSES:  1. Prosthetic valve dysfunction  2. Aortic stenosis  3. COPD  4. Previous stroke  5. Chronic anticoagulation  6. S/P mechanical aortic valve replacement in 2012     OPERATIONS PERFORMED:  1)  Aortic valve re-replacement with a small LivaNova Perceval pericardial prosthesis    2)  Redo sternotomy     ANESTHESIA:  General endotracheal.     ESTIMATED BLOOD LOSS:  100 mL.     BRIEF HISTORY:  Michelle Hodges is a 49 y.o. female who presents with s/p mechanical AVR 2012 in [facility], COPD on home oxygen PRN 2L NC, stroke in 2011 with left sided deficits that resolved after TPA and current smoker. Pt was told her aortic valve was not working and went in for reop for redo AVR then SHADE normal functioning aortic valve, surgery was cancelled. Pt has c/o increase COOPER, leg swelling and chest pain that has processed over the past 2 years.  An echo demonstrated high velocities and a mean gradient of 50 mm of mercury.  She now presents for redo aortic valve replacement.     PROCEDURE IN DETAIL:  After obtaining informed and written consent, the patient   was brought to the Operating Room and placed on the operating table in supine   position.  After induction of adequate general endotracheal anesthesia, the   patient's chest, abdomen, pelvis and bilateral lower extremities were prepped   and draped in the usual sterile fashion. A wire was placed in the right common femoral artery using Seldinger technique. Its position in the descending aorta was confirmed using SHADE. We then turned our attention to the chest, where the patient's previous upper midline   skin  incision was reopened.  Dissection was carried down to the level of the   sternum.  The wires were divided, but not removed.  The oscillating saw was then   used to divide the anterior table of the sternum.  The wires were removed, and   the posterior table of the sternum was divided using the straight Aldana scissors.    Once the sternum had been safely traversed, the mammary retractor was placed   and the retrosternal adhesions were taken down first on the left and then on the   right using the electrocautery device.  The sternal retractor was then placed,   and we began the intrapericardial portion of the dissection.  Not surprisingly, the   adhesions were at least moderate.  We began at the diaphragmatic surface of the   heart, and carefully worked our way down to the inferior vena cava.  We then   proceeded along the right atrial free wall.  The right atrial free wall was   fairly densely adherent to the pericardium.  Eventually, we were able to work our way to  the superior vena cava.  We   were able to expose the anterior surface of the superior vena cava.  We then   turned our attention to the aorta.  We were able to dissect distally   until we reached the origin of the innominate artery.  At this time, we had   adequate exposure for cannulation.  The patient was systemically heparinized.    Cannulation sutures were placed in   the aorta and in the right atrial free wall.  The aortic cannula was inserted. The venous cannula was then inserted.  An   antegrade cardioplegia catheter was placed.  A coronary sinus catheter was   placed through the right atrial free wall and into the coronary sinus using SHADE   guidance.  The patient was then put on cardiopulmonary bypass.  Once on bypass,   the aorta was  from the pulmonary artery.  The aortic crossclamp was   applied, and the heart was arrested using cold blood enhanced antegrade   cardioplegia.  A prompt electromechanical arrest was achieved.  Once the    cardioplegia was all in, a transverse aortotomy was made. The exposure was suboptimal, and so the aorta was completely transected.  The valve exposure   sutures were placed, and the aortic valve was visualized.   The leaflets demonstrated full range of motion, but there was a sub annular pannus present.  The valve was well seated.  The valve sutures were grasped with a tonsil clamp.  They were carefully mobilized, and then divided using a number 11 blade.  Once the sutures were divided, the sewing ring was grasped with a tonsil clamp, and a [place] elevator was used to dissect the sewing ring away from the annulus.  Once the valve was out, the annulus was debrided.  The imbedded pledgets were removed.  Once the annulus was   satisfactory, the ventricle was irrigated with a liter of cold saline.  The   annulus was sized, and a small Perceval valve was selected.  While the valve   was being prepared, the three guide sutures were placed at the louie of all 3   commissures.  Their relation to each other was confirmed by reinserting the   small sizer to confirm appropriate spacing.  Once the valve was prepared, the   sutures were passed through the guide loops and it was slid into position.  It   was deployed. It deployed nicely and the position was satisfactory after evaluating above and below the valve.  The valve was irrigated with   warm saline as it was balloon dilated to 4 atmospheres for 30 seconds.  The   positioning was reevaluated and was confirmed to be acceptable.  The aortotomy   was then reconstructed using running 4-0 Prolene suture.  Once the   aorta was closed, the hot shot was given retrograde.  De-airing maneuvers   were performed, and the aortic cross-clamp was removed.  The patient was   subsequently weaned from cardiopulmonary bypass.  The patient did separate   easily from bypass.  Once off bypass, all surgical sites were inspected.  There   was good hemostasis.  The valve was evaluated using SHADE.   The mean gradient was   7 mmHg.  There were no perivalvular leaks.  The test dose of protamine was   administered, and this was well tolerated.  The total dose was then given.    longterm through the total dose, all cannulas were removed.  All surgical sites   were again inspected, again there was good hemostasis.  Ventricular pacing wires   were placed.  Four drains were placed.  After again confirming adequate   hemostasis, the patient's chest was closed using #6 stainless steel wires   to reapproximate the sternum.  The overlying soft tissues were reapproximated   using absorbable suture material.  The patient's chest was washed and dried, and   a dry dressing was applied.  The patient tolerated the procedure well, there   were no complications.  At the conclusion of the case, sponge and instrument   counts were correct.

## 2020-03-20 LAB
FINAL PATHOLOGIC DIAGNOSIS: NORMAL
GROSS: NORMAL

## 2020-04-16 ENCOUNTER — OFFICE VISIT (OUTPATIENT)
Dept: CARDIOTHORACIC SURGERY | Facility: CLINIC | Age: 50
End: 2020-04-16
Payer: MEDICARE

## 2020-04-16 DIAGNOSIS — Z95.2 S/P AVR (AORTIC VALVE REPLACEMENT): Primary | ICD-10-CM

## 2020-04-16 PROCEDURE — 99024 POSTOP FOLLOW-UP VISIT: CPT | Mod: 95,POP,, | Performed by: THORACIC SURGERY (CARDIOTHORACIC VASCULAR SURGERY)

## 2020-04-16 PROCEDURE — 99024 PR POST-OP FOLLOW-UP VISIT: ICD-10-PCS | Mod: 95,POP,, | Performed by: THORACIC SURGERY (CARDIOTHORACIC VASCULAR SURGERY)

## 2020-04-16 NOTE — PROGRESS NOTES
The patient location is: at home in [place]  The chief complaint leading to consultation is: Post Operative follow up-AVR redo sternotomy  Visit type: audiovisual  Total time spent with patient: 35  Each patient to whom he or she provides medical services by telemedicine is:  (1) informed of the relationship between the physician and patient and the respective role of any other health care provider with respect to management of the patient; and (2) notified that he or she may decline to receive medical services by telemedicine and may withdraw from such care at any time.    Patient seen and examined via virtual visit. Patient is progressively well and increasing activity. No significant complaints.  Recently seen by her PCP for SOB but PCP believes she may need blood per patient.  No extremity swelling, chest pain or increased SOB with activity.  Sternum: stable, incision CDI    Assessment:  Surgery Performed: Aortic valve re-replacement with a small LivaNova Perceval pericardial prosthesis with redo sternotomy on 3/10/2020.     Plan:  Can begin driving   Can begin cardiac rehab 6 weeks after operation   We will refer to cardiology to assume care   DC lasix, potassium    No scheduled appointment, RTC prn    CTS Attending Note:    I agree with the HUGO's note as stated above.  I had a telemedicine visit with the patient.  She reports that she has had some shortness of breath recently, but was seen by her physician and nothing appeared to be amiss.  She continues to self isolate and is working on her exercise tolerance.

## 2020-04-16 NOTE — LETTER
[facility]  [address]  [facility] [address]  Phone: [phone] April 16, 2020      Prem Giron Jr., MD  [address] [place] [id number]    Patient: Michelle Hodges   MR Number: [id number]   YOB: 1970   Date of Visit: 4/16/2020     Dear Dr. Giron,     I had the pleasure of having a telemedicine visit with your patient Ms. Michelle Hodges today.  As you know, she is a pleasant 49-year-old woman who originally underwent mechanical aortic valve replacement in 2012.  After years of struggling with her anticoagulation, she was found to have a dysfunctional mechanical prosthesis.  She was symptomatic for this.  On March 10th, she underwent redo sternotomy and re-replacement of her aortic valve with a pericardial tissue prosthesis.  Her postoperative course was remarkable only for transient thrombocytopenia, and she was ultimately discharged home.      When I saw her on telemedicine today, our connection was fairly poor, however she reported that other than some recent shortness of breath that she thought might be due to anemia she was doing well.  She reports that she is maintaining self isolation and increasing her activity level.  Given how she is doing, and the current situation, I did not schedule her to follow up with me.  Certainly, should you or she have any questions or concerns please do not hesitate to give me a call.  Thank you for sending this pleasant woman to me.  It was a pleasure to assist in her care.  Of note, I have included a copy of her operative report for your records.  Thank you again for sending her to [facility].    Sincerely,        Jack Jara MD  Chief, Division of Thoracic & Cardiovascular Surgery  [facility]    PEP/afw    CC  Ramiro Orozco MD                      DATE OF PROCEDURE:  03/10/2020     ATTENDING SURGEON:  Jack Jara M.D.     ASSISTANT:  Robert Bond M.D.  (Cardiothoracic resident)     PREOPERATIVE DIAGNOSES:  1. Prosthetic valve dysfunction  2. Aortic stenosis  3. COPD  4. Previous stroke  5. Chronic anticoagulation  6. S/P mechanical aortic valve replacement in 2012     POSTOPERATIVE DIAGNOSES:  1. Prosthetic valve dysfunction  2. Aortic stenosis  3. COPD  4. Previous stroke  5. Chronic anticoagulation  6. S/P mechanical aortic valve replacement in 2012     OPERATIONS PERFORMED:  1)  Aortic valve re-replacement with a small LivaNova Perceval pericardial prosthesis     2)  Redo sternotomy     ANESTHESIA:  General endotracheal.     ESTIMATED BLOOD LOSS:  100 mL.     BRIEF HISTORY:  Michelle Hodges is a 49 y.o. female who presents with s/p mechanical AVR 2012 in [facility], COPD on home oxygen PRN 2L NC, stroke in 2011 with left sided deficits that resolved after TPA and current smoker. Pt was told her aortic valve was not working and went in for reop for redo AVR then SHADE normal functioning aortic valve, surgery was cancelled. Pt has c/o increase COOPER, leg swelling and chest pain that has processed over the past 2 years.  An echo demonstrated high velocities and a mean gradient of 50 mm of mercury.  She now presents for redo aortic valve replacement.     PROCEDURE IN DETAIL:  After obtaining informed and written consent, the patient   was brought to the Operating Room and placed on the operating table in supine   position.  After induction of adequate general endotracheal anesthesia, the   patient's chest, abdomen, pelvis and bilateral lower extremities were prepped   and draped in the usual sterile fashion. A wire was placed in the right common femoral artery using Seldinger technique. Its position in the descending aorta was confirmed using SHADE. We then turned our attention to the chest, where the patient's previous upper midline   skin incision was reopened.  Dissection was carried down to the level of the   sternum.  The wires were divided, but not removed.   The oscillating saw was then   used to divide the anterior table of the sternum.  The wires were removed, and   the posterior table of the sternum was divided using the straight Aldana scissors.    Once the sternum had been safely traversed, the mammary retractor was placed   and the retrosternal adhesions were taken down first on the left and then on the   right using the electrocautery device.  The sternal retractor was then placed,   and we began the intrapericardial portion of the dissection.  Not surprisingly, the   adhesions were at least moderate.  We began at the diaphragmatic surface of the   heart, and carefully worked our way down to the inferior vena cava.  We then   proceeded along the right atrial free wall.  The right atrial free wall was   fairly densely adherent to the pericardium.  Eventually, we were able to work our way to  the superior vena cava.  We   were able to expose the anterior surface of the superior vena cava.  We then   turned our attention to the aorta.  We were able to dissect distally   until we reached the origin of the innominate artery.  At this time, we had   adequate exposure for cannulation.  The patient was systemically heparinized.    Cannulation sutures were placed in   the aorta and in the right atrial free wall.  The aortic cannula was inserted. The venous cannula was then inserted.  An   antegrade cardioplegia catheter was placed.  A coronary sinus catheter was   placed through the right atrial free wall and into the coronary sinus using SHADE   guidance.  The patient was then put on cardiopulmonary bypass.  Once on bypass,   the aorta was  from the pulmonary artery.  The aortic crossclamp was   applied, and the heart was arrested using cold blood enhanced antegrade   cardioplegia.  A prompt electromechanical arrest was achieved.  Once the   cardioplegia was all in, a transverse aortotomy was made. The exposure was suboptimal, and so the aorta was completely  transected.  The valve exposure   sutures were placed, and the aortic valve was visualized.   The leaflets demonstrated full range of motion, but there was a sub annular pannus present.  The valve was well seated.  The valve sutures were grasped with a tonsil clamp.  They were carefully mobilized, and then divided using a number 11 blade.  Once the sutures were divided, the sewing ring was grasped with a tonsil clamp, and a [place] elevator was used to dissect the sewing ring away from the annulus.  Once the valve was out, the annulus was debrided.  The imbedded pledgets were removed.  Once the annulus was   satisfactory, the ventricle was irrigated with a liter of cold saline.  The   annulus was sized, and a small Perceval valve was selected.  While the valve   was being prepared, the three guide sutures were placed at the louie of all 3   commissures.  Their relation to each other was confirmed by reinserting the   small sizer to confirm appropriate spacing.  Once the valve was prepared, the   sutures were passed through the guide loops and it was slid into position.  It   was deployed. It deployed nicely and the position was satisfactory after evaluating above and below the valve.  The valve was irrigated with   warm saline as it was balloon dilated to 4 atmospheres for 30 seconds.  The   positioning was reevaluated and was confirmed to be acceptable.  The aortotomy   was then reconstructed using running 4-0 Prolene suture.  Once the   aorta was closed, the hot shot was given retrograde.  De-airing maneuvers   were performed, and the aortic cross-clamp was removed.  The patient was   subsequently weaned from cardiopulmonary bypass.  The patient did separate   easily from bypass.  Once off bypass, all surgical sites were inspected.  There   was good hemostasis.  The valve was evaluated using SHADE.  The mean gradient was   7 mmHg.  There were no perivalvular leaks.  The test dose of protamine was   administered, and  this was well tolerated.  The total dose was then given.    FCI through the total dose, all cannulas were removed.  All surgical sites   were again inspected, again there was good hemostasis.  Ventricular pacing wires   were placed.  Four drains were placed.  After again confirming adequate   hemostasis, the patient's chest was closed using #6 stainless steel wires   to reapproximate the sternum.  The overlying soft tissues were reapproximated   using absorbable suture material.  The patient's chest was washed and dried, and   a dry dressing was applied.  The patient tolerated the procedure well, there   were no complications.  At the conclusion of the case, sponge and instrument   counts were correct.      Electronically signed by Jack Jara MD at 3/17/2020  5:32 PM

## 2020-04-17 ENCOUNTER — PATIENT MESSAGE (OUTPATIENT)
Dept: CARDIOTHORACIC SURGERY | Facility: CLINIC | Age: 50
End: 2020-04-17

## 2022-02-10 NOTE — PROGRESS NOTES
Subjective:      Patient ID: Michelle Hodges is a 49 y.o. female.     Chief Complaint: No chief complaint on file.        HPI:  Michelle Hodges is a 49 y.o. female who presents with s/p mechanical AVR 2012 in [place], , COPD on home oxygen PRN 2L [place], stroke in 2011 with left sided deficits that resolved after TPA and current smoker. Pt was told her aortic valve was not working and went in for reop for redo AVR then SHADE normal functioning aortic valve, surgery was cancelled. Pt has c/o increase COOPER, leg swelling and chest pain that has processed over the past 2 years. Pt here today for surgical eval for redo AVR.         Family and social history reviewed     Review of patient's allergies indicates:  Allergies not on file  No past medical history on file.  No past surgical history on file.      Family History      None          Social History               Socioeconomic History    Marital status:        Spouse name: Not on file    Number of children: Not on file    Years of education: Not on file    Highest education level: Not on file   Occupational History    Not on file   Social Needs    Financial resource strain: Not on file    Food insecurity:       Worry: Not on file       Inability: Not on file    Transportation needs:       Medical: Not on file       Non-medical: Not on file   Tobacco Use    Smoking status: Not on file   Substance and Sexual Activity    Alcohol use: Not on file    Drug use: Not on file    Sexual activity: Not on file   Lifestyle    Physical activity:       Days per week: Not on file       Minutes per session: Not on file    Stress: Not on file   Relationships    Social connections:       Talks on phone: Not on file       Gets together: Not on file       Attends Taoist service: Not on file       Active member of club or organization: Not on file       Attends meetings of clubs or organizations: Not on file       Relationship status: Not on file   Other Topics  Concern    Not on file   Social History Narrative    Not on file            Current medications Reviewed     Review of Systems   Constitutional: Negative for activity change and fatigue.   Respiratory: Positive for shortness of breath. Negative for cough.    Cardiovascular: Positive for leg swelling. Negative for chest pain and palpitations.   Gastrointestinal: Negative for abdominal pain, nausea and vomiting.   Endocrine: Negative for polydipsia, polyphagia and polyuria.   Genitourinary: Negative for dysuria.   Musculoskeletal: Negative for gait problem.   Skin: Negative for rash.   Allergic/Immunologic: Negative for immunocompromised state.   Neurological: Negative for dizziness, syncope and weakness.   Hematological: Does not bruise/bleed easily.   Psychiatric/Behavioral: Negative for behavioral problems.      Objective:   Physical Exam   Constitutional: She is oriented to person, place, and time. She appears well-developed and well-nourished.   HENT:   Head: Normocephalic.   Nose: Nose normal.   Eyes: EOM are normal.   Neck: Normal range of motion.   Cardiovascular: Normal rate, regular rhythm and normal heart sounds.   Pulmonary/Chest: Effort normal and breath sounds normal.   Abdominal: Soft.   Musculoskeletal: Normal range of motion.   Neurological: She is alert and oriented to person, place, and time.   Skin: Skin is warm, dry and intact.   Lower ext discoloration    Psychiatric: She has a normal mood and affect.         Diagnostic Results:   ECHO: 2/13/20  · Normal left ventricular systolic function. The estimated ejection fraction is 65%.  · Normal LV diastolic function.  · No wall motion abnormalities.  · Normal right ventricular systolic function.  · There is a mechanical aortic valve present. Valve was not well visualized. Based on Act of <100ms and AcT/ET ratio of <30 the high mean gradient (50mmHg) and peak velocities (4.7m/s) are mor likely secondary to PPM rather than valve dysfunction  · Mild  tricuspid regurgitation.  · The estimated PA systolic pressure is 29 mmHg.  · Normal central venous pressure (3 mmHg).    All diagnotics and labs reviewed.     STS Score:3%  Assessment:   1. S/P AVR   Plan:   Redo AVR 3/10/20     CTS Attending Note:    I have personally taken the history and examined this patient and agree with the HUGO's note as stated above. Pleasant 49-year-old woman with a 19 mm Medtronic mechanical prosthesis.  Previous fluoro study indicated 1 of the leaflets was stuck.  We plan for redo sternotomy and redo aortic valve replacement.  She has struggled significantly with anticoagulation, and desires a tissue valve.  We plan for a Perceval valve so that TA VI will be an option down the road.  I reviewed the plan with her and her family member in clinic today.  Her operation it was originally scheduled for 2/14.  Unfortunately, there is a heart transplant to night.  My upcoming availability is limited.  I plan to cancel clinic on March 10th and proceed with her operation at that time.  I explained this to her and her family member.  They were very gracious and understanding.   10-Nov-2021

## (undated) DEVICE — RETRACTOR OCTOBASE INSERT HOLD

## (undated) DEVICE — SUT VICRYL BR 1 GEN 27 CT-1

## (undated) DEVICE — INSERTS STEALTH FIBRA SIZE 5

## (undated) DEVICE — SOL 9P NACL IRR PIC IL

## (undated) DEVICE — SUT PROLENE 5-0 BL C-1 4-24

## (undated) DEVICE — BLADE SAW STERNAL 5/BX

## (undated) DEVICE — BLADE PEAK PLASMA

## (undated) DEVICE — Device

## (undated) DEVICE — DRAPE SLUSH WARMER WITH DISC

## (undated) DEVICE — SUT 3-0 CTD VICRYL 27IN PS

## (undated) DEVICE — DRESSING AQUACEL SACRAL 9 X 9

## (undated) DEVICE — GAUZE SPONGE 4X4 12PLY

## (undated) DEVICE — SOL NS 1000CC

## (undated) DEVICE — HEMOSTAT SURGICEL 4X8IN

## (undated) DEVICE — SUT PROLENE 4-0 RB-1 BL MO

## (undated) DEVICE — COVER SET UP STRL 54X54 20/BX

## (undated) DEVICE — DRESSING TELFA STRL 4X3 LF

## (undated) DEVICE — INFLATOR ENCORE

## (undated) DEVICE — SUT 2/0 36IN COATED VICRYL

## (undated) DEVICE — DRESSING MEPILEX BORDR AG 4X12

## (undated) DEVICE — SUT ETHIBOND EXCEL 2-0 SH-2

## (undated) DEVICE — SUT SILK 2-0 SH 18IN BLACK

## (undated) DEVICE — WIPE ESENTA BARR STNG FREE 3ML

## (undated) DEVICE — GLOVE BIOGEL PI MICRO SZ 7.5

## (undated) DEVICE — SOL NACL 0.9% INJ 500ML BG

## (undated) DEVICE — DRAIN CHEST DRY SUCTION

## (undated) DEVICE — SUT 2/0 30IN ETHIBOND

## (undated) DEVICE — FOGERTY SOFT JAW DISP 2/PK

## (undated) DEVICE — DRESSING MEPORE ADH 3.5X12

## (undated) DEVICE — SUT PROLENE 4-0 SH BLU 36IN

## (undated) DEVICE — DRESSING TRANS 4X4 TEGADERM

## (undated) DEVICE — TRAY HEART

## (undated) DEVICE — DRAPE SPLIT STERILE

## (undated) DEVICE — ELECTRODE PAD DEFIB STERILE

## (undated) DEVICE — DRESSING ADH ISLAND 3.6 X 14

## (undated) DEVICE — DRAIN CHANNEL ROUND 19FR

## (undated) DEVICE — SUT SILK BLK BR. 2 2-60

## (undated) DEVICE — SUT 6 18IN STEEL MONO CCS

## (undated) DEVICE — BLADE STERN 65.8MM

## (undated) DEVICE — BRA POST SURGICAL WHT 40-42IN

## (undated) DEVICE — KIT SAHARA DRAPE DRAW/LIFT

## (undated) DEVICE — SET DECANTER MEDICHOICE

## (undated) DEVICE — KIT URINARY CATH URINE METER

## (undated) DEVICE — DRESSING TELFA N ADH 3X8

## (undated) DEVICE — CLOSURE SKIN STERI STRIP 1/2X4

## (undated) DEVICE — ELECTRODE REM PLYHSV RETURN 9